# Patient Record
Sex: MALE | Race: WHITE | NOT HISPANIC OR LATINO | Employment: FULL TIME | URBAN - METROPOLITAN AREA
[De-identification: names, ages, dates, MRNs, and addresses within clinical notes are randomized per-mention and may not be internally consistent; named-entity substitution may affect disease eponyms.]

---

## 2017-01-19 ENCOUNTER — AMBULATORY - HEALTHEAST (OUTPATIENT)
Dept: FAMILY MEDICINE | Facility: CLINIC | Age: 42
End: 2017-01-19

## 2017-01-19 DIAGNOSIS — Z13.228 SCREENING FOR ENDOCRINE, NUTRITIONAL, METABOLIC AND IMMUNITY DISORDER: ICD-10-CM

## 2017-01-19 DIAGNOSIS — Z13.21 SCREENING FOR ENDOCRINE, NUTRITIONAL, METABOLIC AND IMMUNITY DISORDER: ICD-10-CM

## 2017-01-19 DIAGNOSIS — E66.3 OVERWEIGHT (BMI 25.0-29.9): ICD-10-CM

## 2017-01-19 DIAGNOSIS — Z13.0 SCREENING FOR ENDOCRINE, NUTRITIONAL, METABOLIC AND IMMUNITY DISORDER: ICD-10-CM

## 2017-01-19 DIAGNOSIS — Z13.29 SCREENING FOR ENDOCRINE, NUTRITIONAL, METABOLIC AND IMMUNITY DISORDER: ICD-10-CM

## 2017-01-20 ENCOUNTER — OFFICE VISIT - HEALTHEAST (OUTPATIENT)
Dept: FAMILY MEDICINE | Facility: CLINIC | Age: 42
End: 2017-01-20

## 2017-01-20 DIAGNOSIS — Z13.0 SCREENING FOR ENDOCRINE, NUTRITIONAL, METABOLIC AND IMMUNITY DISORDER: ICD-10-CM

## 2017-01-20 DIAGNOSIS — Z12.11 SCREEN FOR COLON CANCER: ICD-10-CM

## 2017-01-20 DIAGNOSIS — Z00.00 VISIT FOR PREVENTIVE HEALTH EXAMINATION: ICD-10-CM

## 2017-01-20 DIAGNOSIS — E66.3 OVERWEIGHT (BMI 25.0-29.9): ICD-10-CM

## 2017-01-20 DIAGNOSIS — E66.9 OBESITY (BMI 30-39.9): ICD-10-CM

## 2017-01-20 DIAGNOSIS — Z13.228 SCREENING FOR ENDOCRINE, NUTRITIONAL, METABOLIC AND IMMUNITY DISORDER: ICD-10-CM

## 2017-01-20 DIAGNOSIS — Z13.29 SCREENING FOR ENDOCRINE, NUTRITIONAL, METABOLIC AND IMMUNITY DISORDER: ICD-10-CM

## 2017-01-20 DIAGNOSIS — Z80.0 FAMILY HISTORY OF COLON CANCER IN MOTHER: ICD-10-CM

## 2017-01-20 DIAGNOSIS — Z13.21 SCREENING FOR ENDOCRINE, NUTRITIONAL, METABOLIC AND IMMUNITY DISORDER: ICD-10-CM

## 2017-01-20 LAB
CHOLEST SERPL-MCNC: 272 MG/DL
FASTING STATUS PATIENT QL REPORTED: ABNORMAL
HBA1C MFR BLD: 5.5 % (ref 4.2–6.1)
HDLC SERPL-MCNC: 53 MG/DL
LDLC SERPL CALC-MCNC: 204 MG/DL
TRIGL SERPL-MCNC: 74 MG/DL

## 2017-01-20 ASSESSMENT — MIFFLIN-ST. JEOR: SCORE: 1933.82

## 2018-04-11 ENCOUNTER — RECORDS - HEALTHEAST (OUTPATIENT)
Dept: ADMINISTRATIVE | Facility: OTHER | Age: 43
End: 2018-04-11

## 2018-04-27 ENCOUNTER — RECORDS - HEALTHEAST (OUTPATIENT)
Dept: ADMINISTRATIVE | Facility: OTHER | Age: 43
End: 2018-04-27

## 2019-04-17 ENCOUNTER — COMMUNICATION - HEALTHEAST (OUTPATIENT)
Dept: FAMILY MEDICINE | Facility: CLINIC | Age: 44
End: 2019-04-17

## 2019-04-17 DIAGNOSIS — Z12.11 SPECIAL SCREENING FOR MALIGNANT NEOPLASMS, COLON: ICD-10-CM

## 2019-04-23 ENCOUNTER — OFFICE VISIT - HEALTHEAST (OUTPATIENT)
Dept: FAMILY MEDICINE | Facility: CLINIC | Age: 44
End: 2019-04-23

## 2019-04-23 DIAGNOSIS — Z13.29 SCREENING FOR ENDOCRINE, NUTRITIONAL, METABOLIC AND IMMUNITY DISORDER: ICD-10-CM

## 2019-04-23 DIAGNOSIS — I48.0 PAROXYSMAL ATRIAL FIBRILLATION (H): ICD-10-CM

## 2019-04-23 DIAGNOSIS — Z80.0 FAMILY HISTORY OF COLON CANCER IN MOTHER: ICD-10-CM

## 2019-04-23 DIAGNOSIS — Z13.0 SCREENING FOR ENDOCRINE, NUTRITIONAL, METABOLIC AND IMMUNITY DISORDER: ICD-10-CM

## 2019-04-23 DIAGNOSIS — Z13.228 SCREENING FOR ENDOCRINE, NUTRITIONAL, METABOLIC AND IMMUNITY DISORDER: ICD-10-CM

## 2019-04-23 DIAGNOSIS — Z00.00 VISIT FOR PREVENTIVE HEALTH EXAMINATION: ICD-10-CM

## 2019-04-23 DIAGNOSIS — E78.2 MIXED HYPERLIPIDEMIA: ICD-10-CM

## 2019-04-23 DIAGNOSIS — Z13.21 SCREENING FOR ENDOCRINE, NUTRITIONAL, METABOLIC AND IMMUNITY DISORDER: ICD-10-CM

## 2019-04-23 LAB
ALBUMIN SERPL-MCNC: 4.4 G/DL (ref 3.5–5)
ALP SERPL-CCNC: 54 U/L (ref 45–120)
ALT SERPL W P-5'-P-CCNC: 27 U/L (ref 0–45)
ANION GAP SERPL CALCULATED.3IONS-SCNC: 7 MMOL/L (ref 5–18)
AST SERPL W P-5'-P-CCNC: 25 U/L (ref 0–40)
BILIRUB SERPL-MCNC: 0.5 MG/DL (ref 0–1)
BUN SERPL-MCNC: 11 MG/DL (ref 8–22)
CALCIUM SERPL-MCNC: 10.2 MG/DL (ref 8.5–10.5)
CHLORIDE BLD-SCNC: 101 MMOL/L (ref 98–107)
CHOLEST SERPL-MCNC: 251 MG/DL
CO2 SERPL-SCNC: 30 MMOL/L (ref 22–31)
CREAT SERPL-MCNC: 0.78 MG/DL (ref 0.7–1.3)
FASTING STATUS PATIENT QL REPORTED: NO
GFR SERPL CREATININE-BSD FRML MDRD: >60 ML/MIN/1.73M2
GLUCOSE BLD-MCNC: 96 MG/DL (ref 70–125)
HBA1C MFR BLD: 5.4 % (ref 3.5–6)
HDLC SERPL-MCNC: 57 MG/DL
LDLC SERPL CALC-MCNC: 178 MG/DL
MAGNESIUM SERPL-MCNC: 2.1 MG/DL (ref 1.8–2.6)
POTASSIUM BLD-SCNC: 4.7 MMOL/L (ref 3.5–5)
PROT SERPL-MCNC: 7.3 G/DL (ref 6–8)
SODIUM SERPL-SCNC: 138 MMOL/L (ref 136–145)
TRIGL SERPL-MCNC: 81 MG/DL
TSH SERPL DL<=0.005 MIU/L-ACNC: 1.42 UIU/ML (ref 0.3–5)

## 2019-04-23 ASSESSMENT — MIFFLIN-ST. JEOR: SCORE: 1916.02

## 2019-09-04 ENCOUNTER — RECORDS - HEALTHEAST (OUTPATIENT)
Dept: ADMINISTRATIVE | Facility: OTHER | Age: 44
End: 2019-09-04

## 2019-11-10 ENCOUNTER — RECORDS - HEALTHEAST (OUTPATIENT)
Dept: ADMINISTRATIVE | Facility: OTHER | Age: 44
End: 2019-11-10

## 2019-11-11 ENCOUNTER — AMBULATORY - HEALTHEAST (OUTPATIENT)
Dept: CARDIOLOGY | Facility: CLINIC | Age: 44
End: 2019-11-11

## 2019-11-11 ENCOUNTER — COMMUNICATION - HEALTHEAST (OUTPATIENT)
Dept: CARDIOLOGY | Facility: CLINIC | Age: 44
End: 2019-11-11

## 2019-11-11 ENCOUNTER — OFFICE VISIT - HEALTHEAST (OUTPATIENT)
Dept: CARDIOLOGY | Facility: CLINIC | Age: 44
End: 2019-11-11

## 2019-11-11 DIAGNOSIS — R07.89 CHEST PRESSURE: ICD-10-CM

## 2019-11-11 DIAGNOSIS — I48.0 PAROXYSMAL ATRIAL FIBRILLATION (H): ICD-10-CM

## 2019-11-11 DIAGNOSIS — E78.5 HYPERLIPIDEMIA LDL GOAL <100: ICD-10-CM

## 2019-11-11 DIAGNOSIS — I48.91 ATRIAL FIBRILLATION, UNSPECIFIED TYPE (H): ICD-10-CM

## 2019-11-11 LAB
ATRIAL RATE - MUSE: 357 BPM
DIASTOLIC BLOOD PRESSURE - MUSE: NORMAL
INTERPRETATION ECG - MUSE: NORMAL
P AXIS - MUSE: NORMAL
PR INTERVAL - MUSE: NORMAL
QRS DURATION - MUSE: 82 MS
QT - MUSE: 356 MS
QTC - MUSE: 423 MS
R AXIS - MUSE: -10 DEGREES
SYSTOLIC BLOOD PRESSURE - MUSE: NORMAL
T AXIS - MUSE: -12 DEGREES
VENTRICULAR RATE- MUSE: 85 BPM

## 2019-11-11 ASSESSMENT — MIFFLIN-ST. JEOR: SCORE: 1929.97

## 2019-11-12 ENCOUNTER — ANESTHESIA - HEALTHEAST (OUTPATIENT)
Dept: CARDIOLOGY | Facility: CLINIC | Age: 44
End: 2019-11-12

## 2019-11-12 ENCOUNTER — HOSPITAL ENCOUNTER (OUTPATIENT)
Dept: CARDIOLOGY | Facility: CLINIC | Age: 44
Discharge: HOME | End: 2019-11-12
Attending: INTERNAL MEDICINE

## 2019-11-12 ENCOUNTER — SURGERY - HEALTHEAST (OUTPATIENT)
Dept: CARDIOLOGY | Facility: CLINIC | Age: 44
End: 2019-11-12

## 2019-11-12 ENCOUNTER — COMMUNICATION - HEALTHEAST (OUTPATIENT)
Dept: FAMILY MEDICINE | Facility: CLINIC | Age: 44
End: 2019-11-12

## 2019-11-12 DIAGNOSIS — I48.0 PAROXYSMAL ATRIAL FIBRILLATION (H): ICD-10-CM

## 2019-11-12 LAB
BSA FOR ECHO PROCEDURE: 2.26 M2
CV BLOOD PRESSURE: NORMAL MMHG
CV ECHO HEIGHT: 73 IN
CV ECHO WEIGHT: 220 LBS
ECHO EJECTION FRACTION ESTIMATED: 60 %
LEFT VENTRICLE HEART RATE: 91 BPM
NUC REST DIASTOLIC VOLUME INDEX: 3520 LBS
NUC REST SYSTOLIC VOLUME INDEX: 73 IN

## 2019-11-14 ENCOUNTER — HOSPITAL ENCOUNTER (OUTPATIENT)
Dept: NUCLEAR MEDICINE | Facility: CLINIC | Age: 44
Discharge: HOME | End: 2019-11-14
Attending: INTERNAL MEDICINE

## 2019-11-14 ENCOUNTER — HOSPITAL ENCOUNTER (OUTPATIENT)
Dept: CARDIOLOGY | Facility: CLINIC | Age: 44
Discharge: HOME | End: 2019-11-14
Attending: INTERNAL MEDICINE

## 2019-11-14 DIAGNOSIS — E78.5 HYPERLIPIDEMIA LDL GOAL <100: ICD-10-CM

## 2019-11-14 DIAGNOSIS — R07.89 CHEST PRESSURE: ICD-10-CM

## 2019-11-14 DIAGNOSIS — I48.0 PAROXYSMAL ATRIAL FIBRILLATION (H): ICD-10-CM

## 2019-11-14 LAB
CV STRESS CURRENT BP HE: NORMAL
CV STRESS CURRENT HR HE: 104
CV STRESS CURRENT HR HE: 105
CV STRESS CURRENT HR HE: 108
CV STRESS CURRENT HR HE: 109
CV STRESS CURRENT HR HE: 121
CV STRESS CURRENT HR HE: 123
CV STRESS CURRENT HR HE: 125
CV STRESS CURRENT HR HE: 128
CV STRESS CURRENT HR HE: 128
CV STRESS CURRENT HR HE: 133
CV STRESS CURRENT HR HE: 147
CV STRESS CURRENT HR HE: 148
CV STRESS CURRENT HR HE: 150
CV STRESS CURRENT HR HE: 61
CV STRESS CURRENT HR HE: 73
CV STRESS CURRENT HR HE: 75
CV STRESS CURRENT HR HE: 75
CV STRESS CURRENT HR HE: 76
CV STRESS CURRENT HR HE: 77
CV STRESS CURRENT HR HE: 80
CV STRESS CURRENT HR HE: 81
CV STRESS CURRENT HR HE: 81
CV STRESS CURRENT HR HE: 82
CV STRESS CURRENT HR HE: 84
CV STRESS CURRENT HR HE: 85
CV STRESS CURRENT HR HE: 89
CV STRESS CURRENT HR HE: 92
CV STRESS CURRENT HR HE: 92
CV STRESS CURRENT HR HE: 94
CV STRESS CURRENT HR HE: 95
CV STRESS CURRENT HR HE: 95
CV STRESS CURRENT HR HE: 97
CV STRESS DEVIATION TIME HE: NORMAL
CV STRESS ECHO PERCENT HR HE: NORMAL
CV STRESS EXERCISE STAGE HE: NORMAL
CV STRESS EXERCISE STAGE REACHED HE: NORMAL
CV STRESS FINAL RESTING BP HE: NORMAL
CV STRESS FINAL RESTING HR HE: 75
CV STRESS MAX HR HE: 150
CV STRESS MAX TREADMILL GRADE HE: 18
CV STRESS MAX TREADMILL SPEED HE: 5
CV STRESS PEAK DIA BP HE: NORMAL
CV STRESS PEAK SYS BP HE: NORMAL
CV STRESS PHASE HE: NORMAL
CV STRESS PROTOCOL HE: NORMAL
CV STRESS RESTING PT POSITION HE: NORMAL
CV STRESS RESTING PT POSITION HE: NORMAL
CV STRESS ST DEVIATION AMOUNT HE: NORMAL
CV STRESS ST DEVIATION ELEVATION HE: NORMAL
CV STRESS ST EVELATION AMOUNT HE: NORMAL
CV STRESS TEST TYPE HE: NORMAL
CV STRESS TOTAL STAGE TIME MIN 1 HE: NORMAL
NUC STRESS EJECTION FRACTION: 52 %
RATE PRESSURE PRODUCT: NORMAL
STRESS ECHO BASELINE DIASTOLIC HE: 81
STRESS ECHO BASELINE HR: 63
STRESS ECHO BASELINE SYSTOLIC BP: 136
STRESS ECHO CALCULATED PERCENT HR: 86 %
STRESS ECHO LAST STRESS DIASTOLIC BP: 100
STRESS ECHO LAST STRESS HR: 148
STRESS ECHO LAST STRESS SYSTOLIC BP: 180
STRESS ECHO POST ESTIMATED WORKLOAD: 13.5
STRESS ECHO POST EXERCISE DUR MIN: 13
STRESS ECHO POST EXERCISE DUR SEC: 3
STRESS ECHO TARGET HR: 174

## 2019-11-21 ENCOUNTER — COMMUNICATION - HEALTHEAST (OUTPATIENT)
Dept: SCHEDULING | Facility: CLINIC | Age: 44
End: 2019-11-21

## 2019-11-21 DIAGNOSIS — E78.2 MIXED HYPERLIPIDEMIA: ICD-10-CM

## 2019-11-21 DIAGNOSIS — E66.9 OBESITY (BMI 30-39.9): ICD-10-CM

## 2019-11-25 ENCOUNTER — OFFICE VISIT - HEALTHEAST (OUTPATIENT)
Dept: FAMILY MEDICINE | Facility: CLINIC | Age: 44
End: 2019-11-25

## 2019-11-25 DIAGNOSIS — E66.3 OVERWEIGHT (BMI 25.0-29.9): ICD-10-CM

## 2019-11-25 DIAGNOSIS — E78.2 MIXED HYPERLIPIDEMIA: ICD-10-CM

## 2019-11-25 LAB
ALBUMIN SERPL-MCNC: 4.2 G/DL (ref 3.5–5)
ALP SERPL-CCNC: 54 U/L (ref 45–120)
ALT SERPL W P-5'-P-CCNC: 29 U/L (ref 0–45)
ANION GAP SERPL CALCULATED.3IONS-SCNC: 8 MMOL/L (ref 5–18)
AST SERPL W P-5'-P-CCNC: 27 U/L (ref 0–40)
BILIRUB SERPL-MCNC: 0.7 MG/DL (ref 0–1)
BUN SERPL-MCNC: 9 MG/DL (ref 8–22)
CALCIUM SERPL-MCNC: 9.4 MG/DL (ref 8.5–10.5)
CHLORIDE BLD-SCNC: 105 MMOL/L (ref 98–107)
CHOLEST SERPL-MCNC: 143 MG/DL
CO2 SERPL-SCNC: 27 MMOL/L (ref 22–31)
CREAT SERPL-MCNC: 0.77 MG/DL (ref 0.7–1.3)
CRP SERPL HS-MCNC: 0.7 MG/L (ref 0–3)
FASTING STATUS PATIENT QL REPORTED: YES
GFR SERPL CREATININE-BSD FRML MDRD: >60 ML/MIN/1.73M2
GLUCOSE BLD-MCNC: 93 MG/DL (ref 70–125)
HDLC SERPL-MCNC: 43 MG/DL
LDLC SERPL CALC-MCNC: 88 MG/DL
POTASSIUM BLD-SCNC: 4.8 MMOL/L (ref 3.5–5)
PROT SERPL-MCNC: 6.7 G/DL (ref 6–8)
SODIUM SERPL-SCNC: 140 MMOL/L (ref 136–145)
TRIGL SERPL-MCNC: 62 MG/DL

## 2019-11-26 LAB — LIPOPROTEIN (A) - HISTORICAL: 17 MG/DL

## 2019-11-27 ENCOUNTER — COMMUNICATION - HEALTHEAST (OUTPATIENT)
Dept: FAMILY MEDICINE | Facility: CLINIC | Age: 44
End: 2019-11-27

## 2019-12-02 ENCOUNTER — COMMUNICATION - HEALTHEAST (OUTPATIENT)
Dept: CARDIOLOGY | Facility: CLINIC | Age: 44
End: 2019-12-02

## 2019-12-02 ENCOUNTER — OFFICE VISIT - HEALTHEAST (OUTPATIENT)
Dept: CARDIOLOGY | Facility: CLINIC | Age: 44
End: 2019-12-02

## 2019-12-02 DIAGNOSIS — I48.0 PAF (PAROXYSMAL ATRIAL FIBRILLATION) (H): ICD-10-CM

## 2019-12-02 DIAGNOSIS — R42 LIGHTHEADEDNESS: ICD-10-CM

## 2019-12-02 ASSESSMENT — MIFFLIN-ST. JEOR: SCORE: 1926.79

## 2019-12-03 ENCOUNTER — COMMUNICATION - HEALTHEAST (OUTPATIENT)
Dept: CARDIOLOGY | Facility: CLINIC | Age: 44
End: 2019-12-03

## 2019-12-03 DIAGNOSIS — I48.0 PAF (PAROXYSMAL ATRIAL FIBRILLATION) (H): ICD-10-CM

## 2019-12-04 ENCOUNTER — AMBULATORY - HEALTHEAST (OUTPATIENT)
Dept: CARDIOLOGY | Facility: CLINIC | Age: 44
End: 2019-12-04

## 2019-12-04 DIAGNOSIS — Z01.818 PREOPERATIVE TESTING: ICD-10-CM

## 2019-12-04 DIAGNOSIS — I48.0 PAF (PAROXYSMAL ATRIAL FIBRILLATION) (H): ICD-10-CM

## 2019-12-05 ENCOUNTER — AMBULATORY - HEALTHEAST (OUTPATIENT)
Dept: CARDIOLOGY | Facility: CLINIC | Age: 44
End: 2019-12-05

## 2019-12-05 ENCOUNTER — COMMUNICATION - HEALTHEAST (OUTPATIENT)
Dept: CARDIOLOGY | Facility: CLINIC | Age: 44
End: 2019-12-05

## 2019-12-05 ENCOUNTER — COMMUNICATION - HEALTHEAST (OUTPATIENT)
Dept: FAMILY MEDICINE | Facility: CLINIC | Age: 44
End: 2019-12-05

## 2019-12-05 DIAGNOSIS — I48.0 PAF (PAROXYSMAL ATRIAL FIBRILLATION) (H): ICD-10-CM

## 2019-12-09 ENCOUNTER — COMMUNICATION - HEALTHEAST (OUTPATIENT)
Dept: CARDIOLOGY | Facility: CLINIC | Age: 44
End: 2019-12-09

## 2019-12-16 ENCOUNTER — HOSPITAL ENCOUNTER (OUTPATIENT)
Dept: MRI IMAGING | Facility: HOSPITAL | Age: 44
Discharge: HOME | End: 2019-12-16
Attending: INTERNAL MEDICINE

## 2019-12-16 DIAGNOSIS — Z01.818 PREOPERATIVE TESTING: ICD-10-CM

## 2019-12-16 DIAGNOSIS — I48.0 PAF (PAROXYSMAL ATRIAL FIBRILLATION) (H): ICD-10-CM

## 2019-12-17 ENCOUNTER — COMMUNICATION - HEALTHEAST (OUTPATIENT)
Dept: CARDIOLOGY | Facility: CLINIC | Age: 44
End: 2019-12-17

## 2019-12-17 LAB
CCTA EJECTION FRACTION: 32 %
CCTA INTERVENTRICULAR SETPUM: 1.1 CM (ref 0.6–1.1)
CCTA LEFT INTERNAL DIMENSION IN SYSTOLE: 3.9 CM (ref 2.1–4)
CCTA LEFT VENTRICULAR INTERNAL DIMENSION IN DIASTOLE: 4.8 CM (ref 3.5–6)
CCTA LEFT VENTRICULAR MASS: 158.82 G
CCTA POSTERIOR WALL: 0.8 CM (ref 0.6–1.1)
MR CARDIAC LEFT VENTRIAL CARDIAC INDEX: 2.9 L/MIN/M2 (ref 1.7–4.2)
MR CARDIAC LEFT VENTRICAL CARDIAC OUTPUT: 6.6 L/MIN (ref 2.8–8.8)
MR CARDIAC LEFT VENTRICULAR DIASTOLIC VOLUME INDEX: 79.89 ML/M2 (ref 47–92)
MR CARDIAC LEFT VENTRICULAR MASS INDEX: 75.87 G/M2 (ref 70–113)
MR CARDIAC LEFT VENTRICULAR MASS: 170 G (ref 118–238)
MR CARDIAC LEFT VENTRICULAR STROKE VOLUME INDEX: 37.49 ML/M2 (ref 32–62)
MR CARDIAC LEFT VENTRICULAR SYSTOLIC VOLUME INDEX: 42.4 ML/M2 (ref 13–30)
MR EJECTION FRACTION: 46.93 %
MR HEIGHT: 1.85 M
MR LEFT VENTRICULAR DYSTOLIC VOLUMEN: 179 ML (ref 77–195)
MR LEFT VENTRICULAR STROKE VOLUMEN: 84 ML (ref 51–133)
MR LEFT VENTRICULAR SYSTOLIC VOLUME: 95 ML (ref 19–72)
MR WEIGHT: 99.8 KG

## 2019-12-26 ENCOUNTER — AMBULATORY - HEALTHEAST (OUTPATIENT)
Dept: CARDIOLOGY | Facility: CLINIC | Age: 44
End: 2019-12-26

## 2019-12-26 ENCOUNTER — SURGERY - HEALTHEAST (OUTPATIENT)
Dept: CARDIOLOGY | Facility: CLINIC | Age: 44
End: 2019-12-26

## 2019-12-26 ENCOUNTER — OFFICE VISIT - HEALTHEAST (OUTPATIENT)
Dept: CARDIOLOGY | Facility: CLINIC | Age: 44
End: 2019-12-26

## 2019-12-26 DIAGNOSIS — I48.0 PAF (PAROXYSMAL ATRIAL FIBRILLATION) (H): ICD-10-CM

## 2019-12-26 LAB
ANION GAP SERPL CALCULATED.3IONS-SCNC: 8 MMOL/L (ref 5–18)
BUN SERPL-MCNC: 10 MG/DL (ref 8–22)
CALCIUM SERPL-MCNC: 9.9 MG/DL (ref 8.5–10.5)
CHLORIDE BLD-SCNC: 105 MMOL/L (ref 98–107)
CO2 SERPL-SCNC: 26 MMOL/L (ref 22–31)
CREAT SERPL-MCNC: 0.77 MG/DL (ref 0.7–1.3)
ERYTHROCYTE [DISTWIDTH] IN BLOOD BY AUTOMATED COUNT: 12.3 % (ref 11–14.5)
GFR SERPL CREATININE-BSD FRML MDRD: >60 ML/MIN/1.73M2
GLUCOSE BLD-MCNC: 65 MG/DL (ref 70–125)
HCT VFR BLD AUTO: 50 % (ref 40–54)
HGB BLD-MCNC: 17 G/DL (ref 14–18)
MCH RBC QN AUTO: 30.7 PG (ref 27–34)
MCHC RBC AUTO-ENTMCNC: 34 G/DL (ref 32–36)
MCV RBC AUTO: 90 FL (ref 80–100)
PLATELET # BLD AUTO: 244 THOU/UL (ref 140–440)
PMV BLD AUTO: 9 FL (ref 8.5–12.5)
POTASSIUM BLD-SCNC: 4.5 MMOL/L (ref 3.5–5)
RBC # BLD AUTO: 5.53 MILL/UL (ref 4.4–6.2)
SODIUM SERPL-SCNC: 139 MMOL/L (ref 136–145)
WBC: 5 THOU/UL (ref 4–11)

## 2019-12-26 ASSESSMENT — MIFFLIN-ST. JEOR: SCORE: 1922.26

## 2019-12-27 LAB
ATRIAL RATE - MUSE: NORMAL
DIASTOLIC BLOOD PRESSURE - MUSE: NORMAL
INTERPRETATION ECG - MUSE: NORMAL
P AXIS - MUSE: NORMAL
PR INTERVAL - MUSE: NORMAL
QRS DURATION - MUSE: 80 MS
QT - MUSE: 360 MS
QTC - MUSE: 447 MS
R AXIS - MUSE: 25 DEGREES
SYSTOLIC BLOOD PRESSURE - MUSE: NORMAL
T AXIS - MUSE: -23 DEGREES
VENTRICULAR RATE- MUSE: 93 BPM

## 2019-12-30 ENCOUNTER — ANESTHESIA - HEALTHEAST (OUTPATIENT)
Dept: CARDIOLOGY | Facility: CLINIC | Age: 44
End: 2019-12-30

## 2019-12-31 ENCOUNTER — SURGERY - HEALTHEAST (OUTPATIENT)
Dept: CARDIOLOGY | Facility: CLINIC | Age: 44
End: 2019-12-31

## 2019-12-31 ASSESSMENT — MIFFLIN-ST. JEOR: SCORE: 1940.4

## 2020-01-01 ASSESSMENT — MIFFLIN-ST. JEOR: SCORE: 1931.44

## 2020-01-02 ENCOUNTER — COMMUNICATION - HEALTHEAST (OUTPATIENT)
Dept: CARDIOLOGY | Facility: CLINIC | Age: 45
End: 2020-01-02

## 2020-01-03 ENCOUNTER — AMBULATORY - HEALTHEAST (OUTPATIENT)
Dept: CARDIOLOGY | Facility: CLINIC | Age: 45
End: 2020-01-03

## 2020-01-03 DIAGNOSIS — I48.0 PAF (PAROXYSMAL ATRIAL FIBRILLATION) (H): ICD-10-CM

## 2020-01-03 DIAGNOSIS — I48.0 PAROXYSMAL ATRIAL FIBRILLATION (H): ICD-10-CM

## 2020-01-03 LAB
ATRIAL RATE - MUSE: 57 BPM
DIASTOLIC BLOOD PRESSURE - MUSE: NORMAL
INTERPRETATION ECG - MUSE: NORMAL
P AXIS - MUSE: 49 DEGREES
PR INTERVAL - MUSE: 188 MS
QRS DURATION - MUSE: 88 MS
QT - MUSE: 436 MS
QTC - MUSE: 483 MS
R AXIS - MUSE: 6 DEGREES
SYSTOLIC BLOOD PRESSURE - MUSE: NORMAL
T AXIS - MUSE: 21 DEGREES
VENTRICULAR RATE- MUSE: 74 BPM

## 2020-01-03 ASSESSMENT — MIFFLIN-ST. JEOR: SCORE: 1934.37

## 2020-01-28 PROBLEM — I48.0 PAF (PAROXYSMAL ATRIAL FIBRILLATION) (H): Status: ACTIVE | Noted: 2019-12-02

## 2020-01-28 PROBLEM — Z80.0 FAMILY HISTORY OF COLON CANCER IN MOTHER: Chronic | Status: ACTIVE | Noted: 2017-01-20

## 2020-01-28 PROBLEM — I48.0 PAF (PAROXYSMAL ATRIAL FIBRILLATION) (H): Chronic | Status: ACTIVE | Noted: 2019-12-02

## 2020-01-28 PROBLEM — E78.5 DYSLIPIDEMIA: Chronic | Status: ACTIVE | Noted: 2020-01-28

## 2020-01-28 PROBLEM — Z80.0 FAMILY HISTORY OF COLON CANCER IN MOTHER: Status: ACTIVE | Noted: 2017-01-20

## 2020-02-03 ENCOUNTER — OFFICE VISIT (OUTPATIENT)
Dept: FAMILY MEDICINE | Facility: CLINIC | Age: 45
End: 2020-02-03
Payer: COMMERCIAL

## 2020-02-03 VITALS
OXYGEN SATURATION: 97 % | DIASTOLIC BLOOD PRESSURE: 77 MMHG | TEMPERATURE: 98.3 F | HEIGHT: 72 IN | BODY MASS INDEX: 29.36 KG/M2 | WEIGHT: 216.75 LBS | SYSTOLIC BLOOD PRESSURE: 121 MMHG | HEART RATE: 54 BPM | RESPIRATION RATE: 14 BRPM

## 2020-02-03 DIAGNOSIS — E78.5 DYSLIPIDEMIA: Primary | Chronic | ICD-10-CM

## 2020-02-03 DIAGNOSIS — Z11.4 SCREENING FOR HIV (HUMAN IMMUNODEFICIENCY VIRUS): ICD-10-CM

## 2020-02-03 DIAGNOSIS — I48.0 PAF (PAROXYSMAL ATRIAL FIBRILLATION) (H): ICD-10-CM

## 2020-02-03 LAB
CHOLEST SERPL-MCNC: 201 MG/DL (ref 0–200)
CHOLEST/HDLC SERPL: 3.8 {RATIO} (ref 0–5)
FASTING SPECIMEN: YES
HDLC SERPL-MCNC: 53 MG/DL
LDLC SERPL CALC-MCNC: 128 MG/DL (ref 0–129)
TRIGL SERPL-MCNC: 97 MG/DL (ref 0–150)
VLDL-CHOLESTEROL: 19 (ref 7–32)

## 2020-02-03 SDOH — HEALTH STABILITY: MENTAL HEALTH: HOW OFTEN DO YOU HAVE A DRINK CONTAINING ALCOHOL?: 4 OR MORE TIMES A WEEK

## 2020-02-03 SDOH — HEALTH STABILITY: MENTAL HEALTH: HOW MANY STANDARD DRINKS CONTAINING ALCOHOL DO YOU HAVE ON A TYPICAL DAY?: 1 OR 2

## 2020-02-03 SDOH — HEALTH STABILITY: MENTAL HEALTH: HOW OFTEN DO YOU HAVE 6 OR MORE DRINKS ON ONE OCCASION?: NEVER

## 2020-02-03 ASSESSMENT — MIFFLIN-ST. JEOR: SCORE: 1900.68

## 2020-02-03 NOTE — NURSING NOTE
"44 year old  Chief Complaint   Patient presents with     \[facility]\"" Care     Lipids     off statin since Dec 2019 wants to check again, is fasting today     Heart Problem     heart ablation in 12/31/2019       Blood pressure 121/77, pulse 54, temperature 98.3  F (36.8  C), temperature source Oral, resp. rate 14, height 1.828 m (5' 11.97\"), weight 98.3 kg (216 lb 12 oz), SpO2 97 %. Body mass index is 29.42 kg/m .  Patient Active Problem List   Diagnosis     PAF (paroxysmal atrial fibrillation) (H)     Family history of colon cancer in mother     Herniated vertebral disc     Dyslipidemia       Wt Readings from Last 2 Encounters:   02/03/20 98.3 kg (216 lb 12 oz)     BP Readings from Last 3 Encounters:   02/03/20 121/77         Current Outpatient Medications   Medication     apixaban ANTICOAGULANT (ELIQUIS) 5 MG tablet     No current facility-administered medications for this visit.        Social History     Tobacco Use     Smoking status: Never Smoker     Smokeless tobacco: Never Used   Substance Use Topics     Alcohol use: Yes     Frequency: 4 or more times a week     Drinks per session: 1 or 2     Binge frequency: Never     Drug use: Not Currently     Types: Marijuana       Health Maintenance Due   Topic Date Due     PREVENTIVE CARE VISIT  1973     HIV SCREENING  08/14/1988     LIPID  08/14/2008       No results found for: PAP      February 3, 2020 8:04 AM    "

## 2020-02-04 LAB — HIV 1+2 AB+HIV1 P24 AG SERPL QL IA: NONREACTIVE

## 2020-02-11 ENCOUNTER — OFFICE VISIT - HEALTHEAST (OUTPATIENT)
Dept: CARDIOLOGY | Facility: CLINIC | Age: 45
End: 2020-02-11

## 2020-02-11 DIAGNOSIS — Z86.79 STATUS POST ABLATION OF ATRIAL FIBRILLATION: ICD-10-CM

## 2020-02-11 DIAGNOSIS — I48.0 PAF (PAROXYSMAL ATRIAL FIBRILLATION) (H): ICD-10-CM

## 2020-02-11 DIAGNOSIS — Z98.890 STATUS POST ABLATION OF ATRIAL FIBRILLATION: ICD-10-CM

## 2020-02-11 DIAGNOSIS — E78.2 MIXED HYPERLIPIDEMIA: ICD-10-CM

## 2020-02-11 ASSESSMENT — MIFFLIN-ST. JEOR: SCORE: 1904.11

## 2020-02-28 ENCOUNTER — HOSPITAL ENCOUNTER (OUTPATIENT)
Dept: CARDIOLOGY | Facility: CLINIC | Age: 45
Discharge: HOME | End: 2020-02-28

## 2020-02-28 DIAGNOSIS — Z86.79 STATUS POST ABLATION OF ATRIAL FIBRILLATION: ICD-10-CM

## 2020-02-28 DIAGNOSIS — Z98.890 STATUS POST ABLATION OF ATRIAL FIBRILLATION: ICD-10-CM

## 2020-03-11 ENCOUNTER — HEALTH MAINTENANCE LETTER (OUTPATIENT)
Age: 45
End: 2020-03-11

## 2020-03-12 ENCOUNTER — AMBULATORY - HEALTHEAST (OUTPATIENT)
Dept: CARDIOLOGY | Facility: CLINIC | Age: 45
End: 2020-03-12

## 2020-03-12 DIAGNOSIS — I48.0 PAF (PAROXYSMAL ATRIAL FIBRILLATION) (H): ICD-10-CM

## 2020-03-24 ENCOUNTER — OFFICE VISIT - HEALTHEAST (OUTPATIENT)
Dept: CARDIOLOGY | Facility: CLINIC | Age: 45
End: 2020-03-24

## 2020-03-24 DIAGNOSIS — Z98.890 STATUS POST ABLATION OF ATRIAL FIBRILLATION: ICD-10-CM

## 2020-03-24 DIAGNOSIS — I48.0 PAF (PAROXYSMAL ATRIAL FIBRILLATION) (H): ICD-10-CM

## 2020-03-24 DIAGNOSIS — Z86.79 STATUS POST ABLATION OF ATRIAL FIBRILLATION: ICD-10-CM

## 2021-01-04 ENCOUNTER — HEALTH MAINTENANCE LETTER (OUTPATIENT)
Age: 46
End: 2021-01-04

## 2021-04-25 ENCOUNTER — HEALTH MAINTENANCE LETTER (OUTPATIENT)
Age: 46
End: 2021-04-25

## 2021-04-25 ENCOUNTER — COMMUNICATION - HEALTHEAST (OUTPATIENT)
Dept: ADMINISTRATIVE | Facility: CLINIC | Age: 46
End: 2021-04-25

## 2021-04-29 ENCOUNTER — COMMUNICATION - HEALTHEAST (OUTPATIENT)
Dept: CARDIOLOGY | Facility: CLINIC | Age: 46
End: 2021-04-29

## 2021-05-27 NOTE — TELEPHONE ENCOUNTER
Spoke with patient relay message below. Patient would like for us to setup appointment for Colonoscopy.   Scheduled physical appointment for 4/23/19 @9:20am. If unable to come, patient will re-schedule.

## 2021-05-27 NOTE — TELEPHONE ENCOUNTER
Referral Request  Type of referral: colonoscopy  Who s requesting: Patient  Why the request: pt had a referral for a colonoscopy, but never got one and the referral lapsed.  Have you been seen for this request: Yes  Does patient have a preference on a group/provider? no  Okay to leave a detailed message?  Yes

## 2021-05-27 NOTE — TELEPHONE ENCOUNTER
New colonoscopy order signed, [place] johnny she will call him to schedule an appointment.  Please have him schedule a general physical appointment.

## 2021-05-28 NOTE — PROGRESS NOTES
Assessment:      Healthy male exam.        Plan:     1.  History of lone A. fib, recent recurrence of symptoms, we discussed about possibly seeing a cardiologist to determine if patient needs to be on long-term antiarrhythmic or/and anticoagulation or just observation.  Mom has a history of colon cancer, we discussed about screening colonoscopy, risk and benefit reviewed, referral orders was signed.  2. Patient Counseling:  --Nutrition: Stressed importance of moderation in sodium/caffeine intake, saturated fat and cholesterol, caloric balance, sufficient intake of fresh fruits, vegetables, fiber, calcium, iron.  --Discussed the issue of calcium supplement, and the daily use of baby aspirin.  --Exercise: Stressed the importance of regular exercise.   --Substance Abuse: Discussed cessation/primary prevention of tobacco, alcohol, or other drug use; driving or other dangerous activities under the influence; availability of treatment for abuse.    --Sexuality: Discussed sexually transmitted diseases, partner selection, use of condoms, avoidance of unintended pregnancy.  --Injury prevention: Discussed safety belts, safety helmets, smoke detector, smoking near bedding or upholstery.   --Dental health: Discussed importance of regular tooth brushing, flossing, and dental visits.  --Immunizations reviewed.  --Discussed timing and benefits of screening colonoscopy and alternative options.  --After hours service discussed with patient             -- I have had an Advance Directives discussion with the patient.  The following high BMI interventions were performed this visit: encouragement to exercise and weight loss from baseline weight    3. Discussed the patient's BMI with him.  The BMI is above average; BMI management plan is completed  4. Follow up as needed for acute illness     Subjective:      Juancarlos Causey is a 43 y.o. male who presents for an annual exam. The patient reports that there is not domestic  violence in his life.   History of lone MARIA ISABEL mahoney, has been experiencing new symptoms, perhaps once a week, usually exacerbated by alcohol intake, sometime by exercise also, rest seems to be helping.  Healthy Habits:   Regular Exercise: Yes  Sunscreen Use: Yes  Healthy Diet: Yes  Dental Visits Regularly: Yes  Seat Belt: Yes  Sexually active: Yes  Monthly Self Testicular Exams:  Yes  Hemoccults: N/A  Flex Sig: N/A  Colonoscopy: Phone number given to schedule an appointment.  Mom has a history of colon cancer.  Lipid Profile: Yes and  elevated, discussed about starting medication if LDL  above 190  Glucose Screen: Yes  Prevention of Osteoporosis: Yes  Last Dexa: N/A  Guns at Home:  N/A      Immunization History   Administered Date(s) Administered     Influenza [id number], 11/06/2009     Influenza, inj, historic,unspecified 10/02/2009     Influenza, seasonal,quad inj 6-35 mos 10/01/2012     Tdap 01/20/2017     Immunization status: stated as current, but no records available.  Vision Screening:both eyes  Hearing: PASS     No current outpatient medications on file.     No current facility-administered medications for this visit.      No past medical history on file.  Past Surgical History:   Procedure Laterality Date     HERNIA REPAIR Bilateral      Patient has no known allergies.  Family History   Problem Relation Age of Onset     Breast cancer Mother      Colon cancer Mother      Diabetes Father      Atrial fibrillation Father      Arthritis Maternal Grandmother      Social History     Socioeconomic History     Marital status:      Spouse name: Not on file     Number of children: Not on file     Years of education: Not on file     Highest education level: Not on file   Occupational History     Not on file   Social Needs     Financial resource strain: Not on file     Food insecurity:     Worry: Not on file     Inability: Not on file     Transportation needs:     Medical: Not on file     Non-medical: Not on file  "  Tobacco Use     Smoking status: Never Smoker     Smokeless tobacco: Never Used   Substance and Sexual Activity     Alcohol use: Yes     Frequency: 4 or more times a week     Drug use: Never     Sexual activity: Yes   Lifestyle     Physical activity:     Days per week: Not on file     Minutes per session: Not on file     Stress: Not on file   Relationships     Social connections:     Talks on phone: Not on file     Gets together: Not on file     Attends Orthodox service: Not on file     Active member of club or organization: Not on file     Attends meetings of clubs or organizations: Not on file     Relationship status: Not on file     Intimate partner violence:     Fear of current or ex partner: Not on file     Emotionally abused: Not on file     Physically abused: Not on file     Forced sexual activity: Not on file   Other Topics Concern     Not on file   Social History Narrative     Not on file     No specialty comments available.  Review of Systems  General:  Denies problem  Eyes: Denies problem  Ears/Nose/Throat: Denies problem  Cardiovascular: Denies problem  Respiratory:  Denies problem  Gastrointestinal:  Denies problem  Genitourinary: Denies problem  Musculoskeletal:  Denies problem  Skin: Denies problem  Neurologic: Denies problem  Psychiatric: Denies problem  Endocrine: Denies problem  Heme/Lymphatic: Denies problem   Allergic/Immunologic: Denies problem        Objective:     Vitals:    04/23/19 0924   BP: 120/80   Pulse: (!) 57   SpO2: 99%   Weight: 220 lb 4 oz (99.9 kg)   Height: 6' 0.25\" (1.835 m)     Body mass index is 29.66 kg/m .    Physical  General Appearance: Alert, cooperative, no distress, appears stated age  Head: Normocephalic, without obvious abnormality, atraumatic  Eyes: PERRL, conjunctiva/corneas clear, EOM's intact  Ears: Normal TM's and external ear canals, both ears  Nose: Nares normal, septum midline,mucosa normal, no drainage  Throat: Lips, mucosa, and tongue normal; teeth and gums " normal  Neck: Supple, symmetrical, trachea midline, no adenopathy;  thyroid: not enlarged, symmetric, no tenderness/mass/nodules; no carotid bruit or JVD  Back: Symmetric, no curvature, ROM normal, no CVA tenderness  Lungs: Clear to auscultation bilaterally, respirations unlabored  Heart: Regular rate and rhythm, S1 and S2 normal, no murmur, rub, or gallop,  Abdomen: Soft, non-tender, bowel sounds active all four quadrants,  no masses, no organomegaly  Genitourinary: Penis normal. Right and left testis are descended.No palpable masses.   Rectal: No visible external lesion  Musculoskeletal: Normal range of motion. No joint swelling or deformity.   Extremities: Extremities normal, atraumatic, no cyanosis or edema  Skin: Skin color, texture, turgor normal, no rashes or lesions  Lymph nodes: Cervical, supraclavicular, and axillary nodes normal  Neurologic: He is alert. He has normal reflexes.   Psychiatric: He has a normal mood and affect.        MD Juancarlos Thomas was seen today for annual exam and heart problem.    Diagnoses and all orders for this visit:    Visit for preventive health examination    Family history of colon cancer in mother    Screening for endocrine, nutritional, metabolic and immunity disorder  -     Glycosylated Hemoglobin A1c  -     Comprehensive Metabolic Panel    Paroxysmal atrial fibrillation (H)  -     Thyroid Cascade  -     Magnesium  -     Ambulatory referral to Cardiology    Mixed hyperlipidemia  -     Lipid Cascade

## 2021-05-30 VITALS — BODY MASS INDEX: 30.34 KG/M2 | HEIGHT: 72 IN | WEIGHT: 224 LBS

## 2021-06-02 VITALS — HEIGHT: 72 IN | BODY MASS INDEX: 29.83 KG/M2 | WEIGHT: 220.25 LBS

## 2021-06-03 VITALS
DIASTOLIC BLOOD PRESSURE: 70 MMHG | HEIGHT: 73 IN | HEART RATE: 80 BPM | SYSTOLIC BLOOD PRESSURE: 124 MMHG | BODY MASS INDEX: 29.25 KG/M2 | WEIGHT: 220.7 LBS

## 2021-06-03 NOTE — PRE-PROCEDURE
Procedure Name: FREDDY  Date/Time: 11/12/2019 1:17 PM  Written consent obtained?: Yes  Risks and benefits: Risks, benefits and alternatives were discussed  Consent given by: patient  Expected level of sedation: moderate  ASA Class: Class 1- healthy patient  Mallampati: Grade 1- soft palate, uvula, tonsillar pillars, and posterior pharyngeal wall visible  Patient states understanding of procedure being performed: Yes  Patient's understanding of procedure matches consent: Yes  Procedure consent matches procedure scheduled: Yes  Appropriately NPO: yes  Lungs: lungs clear with good breath sounds bilaterally  Heart: normal heart sounds and rate  History & Physical reviewed: History and physical reviewed and no updates needed  Statement of review: I have reviewed the lab findings, diagnostic data, medications, and the plan for sedation

## 2021-06-03 NOTE — ANESTHESIA PREPROCEDURE EVALUATION
Anesthesia Evaluation      Patient summary reviewed   No history of anesthetic complications     Airway   Mallampati: I  Neck ROM: full   Pulmonary - negative ROS and normal exam                          Cardiovascular   Exercise tolerance: > or = 4 METS  (+) dysrhythmias (a.fib, a.flutter), ,     (-) murmur  ECG reviewed  Rhythm: irregular  Rate: abnormal,    no murmur      Neuro/Psych      Endo/Other - negative ROS      GI/Hepatic/Renal - negative ROS           Dental - normal exam                        Anesthesia Plan  Planned anesthetic: general mask and total IV anesthesia    ASA 2   Induction: intravenous   Anesthetic plan and risks discussed with: patient    Post-op plan: routine recovery

## 2021-06-03 NOTE — TELEPHONE ENCOUNTER
"Pt reports atrial flutter \"couple weeks ago.\"  \"Had cardioversion which worked well.\"  Now taking blood thinners.    HAS QUESTION ABOUT NEW STATIN RX:  Pt has been on new statin Rx x 3 weeks.  Feels concerned about recent lipid labs, stating \"I wasn't fasting when they were drawn.\"  Concerned lipid lab results were erroneous.  Asks if he really needs Crestor.    Advised pt to schedule OV to discuss this issue with Dr Ornelas.  Pt agrees to do so.  Warm transferred to a  for this purpose now.    In the interim, advised pt to continue taking Crestor as prescribed.  Pt agrees to do so.    Pt has OV scheduled for Monday, Nov 25th, 9:20 am.  Can pt come early for lab-draw before appt?  - Can orders be entered?    Pt plans to arrive early for appt.  Will be ready for lab-draw if orders can be entered.    Milly Martínez RN  [facility] Triage     Reason for Disposition    [1] Follow-up call from patient regarding patient's clinical status AND [2] information NON-URGENT     Pt asks to re-do lipid labs -> was not fasting when prior labs were drawn -> concerned results were erroneous.    Protocols used: PCP CALL - NO TRIAGE-A-AH      "

## 2021-06-03 NOTE — PATIENT INSTRUCTIONS - HE
1. Atypical atrial flutter (possibly originating in the left atrium) - plan transesophogeal echocardiogram to rule out clot in the heart then do cardioversion this week.  After cardioversion would continue eliquis for 4 week then stop and start aspirin 162mg daily.  If this returns would advise ablation of the rhythm.  2. Schedule stress nuclear imaging to screen for severe blockages in the heart arteries, if abnormal would do a coronary angiogram  3. Elevated LDL - would start rosuvastatin 20mg at night - watch for muscle aches if present can try adding coenzyme Q 10 200-400mg daily.

## 2021-06-03 NOTE — TELEPHONE ENCOUNTER
Patient Returning Call  Reason for call:  Patient called checking on status of below encounter  Information relayed to patient:  Writer relayed message from Dr Ornelas, patient verbalized understanding  Patient has additional questions:  Yes  If YES, what are your questions/concerns:  Before patient sets up appointment, he would like to know if any type of fasting would be needed or will Dr Ornelas discuss this with him at appointment?  Okay to leave a detailed message?: Yes

## 2021-06-03 NOTE — ANESTHESIA CARE TRANSFER NOTE
Last vitals:   Vitals:    11/12/19 1419   BP: 123/88   Pulse: 62   Resp: 19   Temp:    SpO2: 100%     Patient's level of consciousness is drowsy  Spontaneous respirations: yes  Maintains airway independently: yes  Dentition unchanged: yes  Oropharynx: oropharynx clear of all foreign objects    QCDR Measures:  ASA# 20 - Surgical Safety Checklist: WHO surgical safety checklist completed prior to induction    PQRS# 430 - Adult PONV Prevention: NA - Not adult patient, not GA or 3 or more risk factors NOT present  ASA# 8 - Peds PONV Prevention: NA - Not pediatric patient, not GA or 2 or more risk factors NOT present  PQRS# 424 - Deanna-op Temp Management: NA - MAC anesthesia or case < 60 minutes  PQRS# 426 - PACU Transfer Protocol:NA - Patient did not go to PACU  ASA# 14 - Acute Post-op Pain: ASA14B - Patient did NOT experience pain >= 7 out of 10

## 2021-06-03 NOTE — TELEPHONE ENCOUNTER
Please see if patient can be set up for PVI later this month with Dr. Hoover I did send OV note to his inbox yesterday).  I have ordered sotalol 40 mg twice daily.  Please instruct him to get an EKG after 5 doses.  If patient needs me to discuss with him, I would be happy to do so.

## 2021-06-03 NOTE — TELEPHONE ENCOUNTER
----- Message from Darlene Crowe sent at 12/2/2019  8:25 AM CST -----  Regarding: Afib  Caller: Juancarlos     Primary cardiologist: Dr. Juarez    Detailed reason for call: Recent cardioversion now in Afib, please call back.     Best phone number: [phone]     Best time to contact: Today    Ok to leave a detailed message? Yes    Device? No

## 2021-06-03 NOTE — TELEPHONE ENCOUNTER
Who is calling:  Patient.  Reason for Call:  States his cardiologist put him on a statin. Patient states he has not had a fasting cholesterol done. On the ones that were done, he did not fast for the labs.  Patient would like a second opinion if he really needs to go on a statin.   Please call and advise.  Date of last appointment with primary care: 4/23/2019  Okay to leave a detailed message: Yes

## 2021-06-03 NOTE — TELEPHONE ENCOUNTER
"Return call to patient. Patient reports that he would like to move up his currently scheduled appointment with Brittanie Marlow. He is scheduled tomorrow morning for visit but states his has gone back into \"arryhthmia\" and is uncomfortable and would like to be seen today if possible.     Reviewed symptoms with patient. He  currently complains of \"fluttering\" in his chest and notes his heart rate is around 80 bpm. He states he feels \"woosy\" but is able to \"work through it\". Patient eager to discuss potential ablation and other treatment options. His appointment was rescheduled for this afternoon. -ejb  "

## 2021-06-03 NOTE — PROGRESS NOTES
OFFICE VISIT - FAMILY MEDICINE     ASSESSMENT AND PLAN     1. Mixed hyperlipidemia  Lipid [place]    Comprehensive Metabolic Panel    Lipoprotein A    High Sensitivity C-Reactive Protein(hsCR   2. Overweight (BMI 25.0-29.9)  Lipid Cascade    Comprehensive Metabolic Panel    Lipoprotein A    High Sensitivity C-Reactive Protein(hsCR   Hyperlipidemia, we did review previous lipid panels, we did review ASCVD calculation, 10-year cardiovascular risk is low, 2-3 %, we discussed healthy lifestyle changes, we are doing a lipid panel today, expecting results to be better compared to previous, one option would be to stay on the medication, though to one could possibly hold on the statin, have the patient work harder on  lifestyle changes and recheck in 6 months.  If persistently elevated at that time we will look at lifelong treatment with statin.    CHIEF COMPLAINT   Follow-up (cardioversion; discuss statin therapy)    HPI   Juancarlos Causey is a 44 y.o. male.  Updated MIIC - No Records  Currently being followed by cardiologist for A. Fib with  possible a flutter, rate appears controlled, is on anticoagulation, planning to do ablation down the road.  Denies any chest pain no shortness of breath.  Mild hyperlipidemia, was started on Crestor 20 mg by cardiologist, is been taking for the past 20 days, but concerned about possible side effect.  LDL has been mildly elevated in the past, risk calculation is below.    The 10-year ASCVD risk score (Justin JALIL Jr., et al., 2013) is: 1.4%    Values used to calculate the score:      Age: 44 years      Sex: Male      Is Non- : No      Diabetic: No      Tobacco smoker: No      Systolic Blood Pressure: 118 mmHg      Is BP treated: No      HDL Cholesterol: 43 mg/dL      Total Cholesterol: 143 mg/dL      Review of Systems As per HPI, otherwise negative.    OBJECTIVE   /70 (Patient Site: Left Arm, Patient Position: Sitting, Cuff Size: Adult Regular)   Pulse  (!) 51   Wt 220 lb (99.8 kg)   SpO2 99%   BMI 29.03 kg/m    Physical Exam   Constitutional: He is oriented to person, place, and time. He appears well-developed and well-nourished.   HENT:   Head: Normocephalic and atraumatic.   Neck: Normal range of motion. Neck supple. No JVD present. No tracheal deviation present. No thyromegaly present.   Cardiovascular: Normal rate, regular rhythm, normal heart sounds and intact distal pulses. Exam reveals no gallop and no friction rub.   No murmur heard.  Pulmonary/Chest: Effort normal and breath sounds normal. No respiratory distress. He has no wheezes. He has no rales.   Musculoskeletal:         General: No tenderness or edema.   Lymphadenopathy:     He has no cervical adenopathy.   Neurological: He is alert and oriented to person, place, and time. Coordination normal.   Psychiatric: He has a normal mood and affect. Judgment and thought content normal.       PFSH     Family History   Problem Relation Age of Onset     Breast cancer Mother      Colon cancer Mother      Diabetes Father      Atrial fibrillation Father      Arthritis Maternal Grandmother      Social History     Socioeconomic History     Marital status:      Spouse name: Not on file     Number of children: Not on file     Years of education: Not on file     Highest education level: Not on file   Occupational History     Not on file   Social Needs     Financial resource strain: Not on file     Food insecurity:     Worry: Not on file     Inability: Not on file     Transportation needs:     Medical: Not on file     Non-medical: Not on file   Tobacco Use     Smoking status: Never Smoker     Smokeless tobacco: Never Used   Substance and Sexual Activity     Alcohol use: Yes     Frequency: 4 or more times a week     Drug use: Never     Sexual activity: Yes   Lifestyle     Physical activity:     Days per week: Not on file     Minutes per session: Not on file     Stress: Not on file   Relationships     Social  connections:     Talks on phone: Not on file     Gets together: Not on file     Attends Latter day service: Not on file     Active member of club or organization: Not on file     Attends meetings of clubs or organizations: Not on file     Relationship status: Not on file     Intimate partner violence:     Fear of current or ex partner: Not on file     Emotionally abused: Not on file     Physically abused: Not on file     Forced sexual activity: Not on file   Other Topics Concern     Not on file   Social History Narrative     Not on file     Relevant history was reviewed with the patient today, unless noted in HPI, nothing is pertinent for this visit.  [facility]     Patient Active Problem List    Diagnosis Date Noted     Family history of colon cancer in mother 01/20/2017     Obesity (BMI 30-39.9) 01/20/2017     Past Surgical History:   Procedure Laterality Date     HERNIA REPAIR Bilateral        RESULTS/CONSULTS (Lab/Rad)     Recent Results (from the past 168 hour(s))   Lipid Cascade   Result Value Ref Range    Cholesterol 143 <=199 mg/dL    Triglycerides 62 <=149 mg/dL    HDL Cholesterol 43 >=40 mg/dL    LDL Calculated 88 <=129 mg/dL    Patient Fasting > 8hrs? Yes    Comprehensive Metabolic Panel   Result Value Ref Range    Sodium 140 136 - 145 mmol/L    Potassium 4.8 3.5 - 5.0 mmol/L    Chloride 105 98 - 107 mmol/L    CO2 27 22 - 31 mmol/L    Anion Gap, Calculation 8 5 - 18 mmol/L    Glucose 93 70 - 125 mg/dL    BUN 9 8 - 22 mg/dL    Creatinine 0.77 0.70 - 1.30 mg/dL    GFR MDRD Af Amer >60 >60 mL/min/1.73m2    GFR MDRD Non Af Amer >60 >60 mL/min/1.73m2    Bilirubin, Total 0.7 0.0 - 1.0 mg/dL    Calcium 9.4 8.5 - 10.5 mg/dL    Protein, Total 6.7 6.0 - 8.0 g/dL    Albumin 4.2 3.5 - 5.0 g/dL    Alkaline Phosphatase 54 45 - 120 U/L    AST 27 0 - 40 U/L    ALT 29 0 - 45 U/L   High Sensitivity C-Reactive Protein(hsCR   Result Value Ref Range    CRP, High Sensitivity 0.7 0.0 - 3.0 mg/L     Nm Exercise Stress Test    Result  Date: 11/14/2019     The nuclear stress test is negative for inducible myocardial ischemia or infarction.    The left ventricular ejection fraction at stress is 52%.    There is no prior study for comparison.     MEDICATIONS     Current Outpatient Medications on File Prior to Visit   Medication Sig Dispense Refill     ELIQUIS 5 mg Tab tablet   2     rosuvastatin (CRESTOR) 20 MG tablet Take 1 tablet (20 mg total) by mouth at bedtime. 90 tablet 11     No current facility-administered medications on file prior to visit.        HEALTH MAINTENANCE / SCREENING   PHQ-2 Total Score: 0 (4/23/2019  9:22 AM)  , No data recorded,No data recorded  Immunization History   Administered Date(s) Administered     Influenza [id number], 11/06/2009     Influenza, inj, historic,unspecified 10/02/2009     Influenza, seasonal,quad inj 6-35 mos 10/01/2012     Influenza,seasonal quad, PF, =/> 6months 09/23/2019     Tdap 01/20/2017     Health Maintenance   Topic     HIV SCREENING      PREVENTIVE CARE VISIT      ADVANCE CARE PLANNING      LIPID      TD 18+ HE      INFLUENZA VACCINE RULE BASED      TDAP ADULT ONE TIME DOSE        Jerome Ornelas MD  Family Medicine, [facility]     This note was dictated using a voice recognition software.  Any grammatical or context distortion are unintentional and inherent to the software.

## 2021-06-03 NOTE — TELEPHONE ENCOUNTER
Who is calling:  Patient   Reason for Call:  Patient states his cardiologist put him on Statin on relaying the lab results which was drawn not fasting for lab work . Patient would like to discuss with  about taking statin medication he does not want to be taking long . Writer relay  message to make an appointment . Transferred patient call to scheduling.  Date of last appointment with primary care: unknown  Okay to leave a detailed message: Jerome Kang MDPhysicianSigned  11/18/2019                Will discuss at the appointment

## 2021-06-03 NOTE — ANESTHESIA POSTPROCEDURE EVALUATION
Patient: Juancarlos Causey  * No procedures listed *  Anesthesia type: general    Patient location: Telemetry/Step Down Unit  Last vitals: No vitals data found for the desired time range.    Post vital signs: stable  Level of consciousness: awake, alert and responds to simple questions  Post-anesthesia pain: pain controlled  Post-anesthesia nausea and vomiting: no  Pulmonary: unassisted, return to baseline  Cardiovascular: stable and blood pressure at baseline  Hydration: adequate  Anesthetic events: no    QCDR Measures:  ASA# 11 - Deanna-op Cardiac Arrest: ASA11B - Patient did NOT experience unanticipated cardiac arrest  ASA# 12 - Deanna-op Mortality Rate: ASA12B - Patient did NOT die  ASA# 13 - PACU Re-Intubation Rate: NA - No ETT / LMA used for case  ASA# 10 - Composite Anes Safety: ASA10A - No serious adverse event    Additional Notes:

## 2021-06-03 NOTE — TELEPHONE ENCOUNTER
Left message to call back for: Patient  Information to relay to patient:  Please schedule OV w/Dr. Ornelas.

## 2021-06-03 NOTE — TELEPHONE ENCOUNTER
Left message to call back for: Patient  Information to relay to patient:  Please schedule lab appointment for patient, if needed.

## 2021-06-04 VITALS
SYSTOLIC BLOOD PRESSURE: 96 MMHG | HEIGHT: 73 IN | HEART RATE: 64 BPM | RESPIRATION RATE: 16 BRPM | DIASTOLIC BLOOD PRESSURE: 70 MMHG | BODY MASS INDEX: 29.16 KG/M2 | WEIGHT: 220 LBS

## 2021-06-04 VITALS — HEIGHT: 73 IN | WEIGHT: 221.03 LBS | BODY MASS INDEX: 29.29 KG/M2

## 2021-06-04 VITALS
BODY MASS INDEX: 29.03 KG/M2 | DIASTOLIC BLOOD PRESSURE: 70 MMHG | HEART RATE: 51 BPM | WEIGHT: 220 LBS | OXYGEN SATURATION: 99 % | SYSTOLIC BLOOD PRESSURE: 118 MMHG

## 2021-06-04 VITALS
HEIGHT: 73 IN | RESPIRATION RATE: 18 BRPM | HEART RATE: 66 BPM | BODY MASS INDEX: 28.49 KG/M2 | WEIGHT: 215 LBS | SYSTOLIC BLOOD PRESSURE: 126 MMHG | DIASTOLIC BLOOD PRESSURE: 82 MMHG

## 2021-06-04 VITALS
DIASTOLIC BLOOD PRESSURE: 80 MMHG | HEART RATE: 92 BPM | BODY MASS INDEX: 29.03 KG/M2 | RESPIRATION RATE: 14 BRPM | SYSTOLIC BLOOD PRESSURE: 110 MMHG | HEIGHT: 73 IN | WEIGHT: 219 LBS

## 2021-06-04 VITALS
HEIGHT: 73 IN | RESPIRATION RATE: 16 BRPM | BODY MASS INDEX: 29.38 KG/M2 | SYSTOLIC BLOOD PRESSURE: 116 MMHG | DIASTOLIC BLOOD PRESSURE: 80 MMHG | WEIGHT: 221.7 LBS | HEART RATE: 56 BPM

## 2021-06-04 NOTE — TELEPHONE ENCOUNTER
Medication Question or Clarification  Who is calling: Patient  What medication are you calling about? (include dose and sig) generic Crestor 20 mg daily  Who prescribed the medication?: Cardiologist  What is your question/concern?: The patient stated Dr Ornelas had talked to the cardiologist and left a message in [employer] Patient relays Dr Ornelas is discussing decreasing to 10 mg with the agreement of cardiologist.  Patient wants to discuss stopping all together. Patient is of the mindset the only reason he was started on Crestor was based on reading of cholesterol when he was not fasting He feels it was not needed nor is now.  Please advise.  Pharmacy:[facility]   Okay to leave a detailed message?: Yes [phone]   Patient relays he  is making sure he has  first name and last name on his vm so please leave details.   Site CMT - Please call the pharmacy to obtain any additional needed information.

## 2021-06-04 NOTE — TELEPHONE ENCOUNTER
Called patient and reviewed recommendations per Dr. Juarez. He notes he has already discussed with PCP and is holding medication. He will concentrate on lifestyle changes, have CT Calcium Score in 3-4 months after re-testing lipids and decide whether statins are really needed at that time. -jolanta

## 2021-06-04 NOTE — ANESTHESIA POSTPROCEDURE EVALUATION
Patient: Juancarlos Causey  EP Ablation PVI - 530AM ADMIT, GEN ANES - WHOLE CASE, NO DEV, MALCOLM - NOTIFIED 12/4, 4HRS W/ICE, H&P - 12/26, TEACH - EP RN, EP Ablation Atrial Flutter  Anesthesia type: general    Patient location: Telemetry/Step Down Unit  Last vitals:   Vitals Value Taken Time   /74 12/31/2019  7:49 PM   Temp 36.4  C (97.6  F) 12/31/2019  7:49 PM   Pulse 59 12/31/2019  8:16 PM   Resp 18 12/31/2019  7:49 PM   SpO2 96 % 12/31/2019  7:49 PM   Vitals shown include unvalidated device data.  Post vital signs: stable  Level of consciousness: awake and responds to simple questions  Post-anesthesia pain: pain controlled  Post-anesthesia nausea and vomiting: no  Pulmonary: unassisted, return to baseline  Cardiovascular: stable and blood pressure at baseline  Hydration: adequate  Anesthetic events: no    QCDR Measures:  ASA# 11 - Deanna-op Cardiac Arrest: ASA11B - Patient did NOT experience unanticipated cardiac arrest  ASA# 12 - Deanna-op Mortality Rate: ASA12B - Patient did NOT die  ASA# 13 - PACU Re-Intubation Rate: ASA13B - Patient did NOT require a new airway mgmt  ASA# 10 - Composite Anes Safety: ASA10A - No serious adverse event    Additional Notes:

## 2021-06-04 NOTE — TELEPHONE ENCOUNTER
Left message to call back for: Patient  Information to relay to patient:  Left detailed on patient's personal VM of message per MD. Please find out when patient calls back with which option he would like to do. CT calcium score cost is $200 out of pocket-insurance generally doesn't cover.

## 2021-06-04 NOTE — TELEPHONE ENCOUNTER
PC from pt   He reports having 1 suture in place in both the left and the right groin site  He states that he is reporting that there is bruising at both the left and right groin site, and is wondering if this is normal  Pt denies any drainage from the groin site, denies any hardening at the groin sites, denies pain, groins are soft, and pt denies any further changes  Reviewed with pt normal healing process s/p ablation  Pt will continue with f/u s/p ablation tomorrow 1/3/19  Pt verbalized understanding  Elba  ----- Message from Steph Escobar RN sent at 1/2/2020  3:19 PM CST -----  Regarding: Post-ablation question    ----- Message -----  From: Darlene Crowe  Sent: 1/2/2020   3:11 PM CST  To: Steph Escobar, RN  Subject: Ann                                          Caller: Juancarlos    Primary cardiologist: Dr. Juarez    Detailed reason for call: Has concern regarding suture / wound site, he is post ablation 12/31/19. Please call back.     Best phone number: [phone]    Best time to contact: Today    Ok to leave a detailed message? Yes    Device? No

## 2021-06-04 NOTE — TELEPHONE ENCOUNTER
"PC to pt to discuss starting Sotalol prior to PVI  Pt states that he went back into SR late Monday evening 12/2/19  He feels that his heart is \"unsteady and feels like he could go back into AF at any time\"  He states that he is able to tolerate his symptoms while in AF, and his HR is controlled while in AF with HRs in the 80's  Last date in Dec for ablation with Dr Hoover is 12/31, which pt will be offered  Pts schedule is limiting with scheduling an EKG after starting Sotalol  Pt is currently out of town from 12/5/19-12/16/19  If pt were to start on Sotalol requiring EKG after 5 doses, pt could start on 12/16 when he returns, placing pt on Sotalol for < 2 wks prior to PVI  I know you mentioned Flecainide and BB could not be used, with evidence of bradycardia  Would you still like pt to start Sotalol or alternate medication prior to PVI?  Please advise  Thank you,  Elba    "

## 2021-06-04 NOTE — PROGRESS NOTES
Post Procedural PVI Follow Up Visit    Pt is seen in clinic today status post PVI with Dr Hoover on 12/31/19.     General Assessment:   Pts vital signs stable, weight compared to pre procedural weight and is stable, lung sounds clear, heart sounds S1 and S2 present, palpable radial and pedal pulses and no edema/fluid retention noted.   Pt denies generalized or localized pain abnormal to healing s/p, difficulty swallowing, SOB, thoat pain, heart burn, chest pain, urinary retention/difficulty and s/s of infection.  Pt is tolerating advancement in activity well, staying well hydrated, has a good appetite, eating well balanced meals and gradually working into baseline activity.     Rhythm Assessment:   Pt denies symptoms or sustained AF episodes.    Procedure Site Assessment:   Pts right groin has 2 puncture sites, is C/D/I, no drainage, moderate amount of bruising noted around puncture sites, 1 suture in place, suture removed, groin is soft, and pt denies pain at the site. Left groin has 1 puncture sites, is C/D/I, no drainage, small amount of amount of bruising noted around puncture site, 1 suture in place, sutures removed, groin is soft, and pt denies pain at the site. No bruits we heard bilaterally, and pt has strong bilateral femoral and pedal pulses present. All puncture sites were left open to air.    Anticoagulation/Medication:  Pt was instructed to remain on Eliquis without interruption until seen for 3mo follow-up, for further instructions/managment.  Reviewed with pt importance of anticoagulation s/p PVI, reviewed with pt s/s of bleeding while on anticoagulation s/p PVI and encouraged to call with any questions or concerns about anticoagulants  Pt will continue ASA 81mg Daily for 1 mo s/p PVI    Education completed with pt at this visit:  Reviewed post-op PVI healing process, follow up office visit requirements, physical restrictions, nutritional requirements, when to contact EP-RN/EP-MD, contact information  was given to the pt for further concerns or questions and pt verbalized understanding    1/3/2020 2:03 PM  Kiarra Burkett RN

## 2021-06-04 NOTE — TELEPHONE ENCOUNTER
----- Message -----  From: Neymar Juarez MD  Sent: 12/5/2019   5:09 PM CST  To: Andria Montenegro RN    LDL is not that low only 88, would advise continuing crestor.    Would advocate diet/exercise, repeat lipids in 6 months and if LDL dramatically lower would lower crestor.

## 2021-06-04 NOTE — TELEPHONE ENCOUNTER
PC back to wife   Reviewed post-op PVI healing process, follow up office visit requirements, physical restrictions, contact information was given to the pt for further concerns or questions and pt verbalized understanding.  Also reviewed travel restrictions s/p, and expected recovery time.  12/17/2019 9:58 AM  Stephanie Ferrera RN    ----- Message from Steph Escobar RN sent at 12/17/2019  9:40 AM CST -----  Regarding: FW: Ann De La Paz  EP related question. . .  ----- Message -----  From: Kirsten Robison  Sent: 12/17/2019   9:20 AM CST  To: Steph Escobar RN  Subject: Ann De La Paz                                       General phone call:    Caller: Wife Lizzette  Primary cardiologist: Ann   Detailed reason for call: Lizzette is calling with questions regarding her husbands upcoming ablation to be done by Kiko on 12/31/19. They have travel plans over the holidays and she is wondering if it is possible to travel soon after the ablation    Best phone number: [phone] Lizzette  Best time to contact: any  Ok to leave a detailed message? yes  Device? no    Additional Info:

## 2021-06-04 NOTE — PROGRESS NOTES
PVI education was completed:     See documented H&P completed by NP in EPIC    Reviewed pre and post op PVI procedure with pt, pre-procedure letter reviewed and given to pt- see letter in EPIC under documentation, see additional EP PVI prep note for details on procedure/prep, answered pt questions and concerns regarding procedure, time spent with pt was >30min and reviewed available pre-op lab results    Discussed importance of continuing anticoagulation uninterrupted pre and post ablation, pt denies missing any doses of their anticoagulant, pt denies issues with fermin placement in the past, instructions given to pt to start Pepcid 20mg BID 3 days prior to PVI, and to continue 3 wks s/p PVI and perscription was sent for pepcid 20mg BID  12/26/2019 9:45 AM  Stephanie Ramos RN

## 2021-06-04 NOTE — TELEPHONE ENCOUNTER
Phone call from patient wondering about the timing of his MRI.  Pt is scheduled for an MRI of his pulmonary veins on 1-30-20.  Will need to have MRI scheduled prior to ablation on 12-31-19.  Phone call to Radiology and central scheduling, pt will be contacted for earlier apt and it will be prior to his ablation.    Pt also notes some concern with his cholesterol and statin.  He states that his cholesterol has been high, noted on multiple lab visits and when he saw Dr. Juarez in clinic on 11-11-19 he was started on Crestor 20 mg.    His PMD checked another lipid panel on 11-25-19 and his cholesterol had dramatically decreased.  He wonders if it is due to the fact that this was a fasting sample or if it could truly be related to starting a statin.  He is uncomfortable with taking medication and would like to possibly try a lower dose or even stop medication and try another fasting lab in the future with modified lifestyle.    Will review with Dr. Juarez and his nurse.  Pt states understanding and reviewed contact information.

## 2021-06-04 NOTE — TELEPHONE ENCOUNTER
----- Message from Steph Escobar RN sent at 12/5/2019  9:38 AM CST -----  Regarding: FW: Question about the MRI  MR ordered by Dr. Hoovre.  ----- Message -----  From: Jana Armijo  Sent: 12/5/2019   9:19 AM CST  To: Steph Escobar RN  Subject: Question about the MRI                           Caller: Patient    Primary cardiologist: Joe Marlow    Detailed reason for call: Patient stated that he is having an ablation on 12/31/19 and was wondering if his MR that was scheduled at 1/30/20 ok or does it need to be done before the surgery?    Best phone number: [phone]    Best time to contact: Today     Ok to leave a detailed message? Yes    Device? No    Additional Info:

## 2021-06-04 NOTE — PATIENT INSTRUCTIONS - HE
Your anticoagulation medication Eliquis:    It is important to remain on your anticoagulation medication uninterrupted after your ablation to reduce your risk of a stroke or heart attack, do not stop this medication    Please remain on your aspirin for 1 month, then you can stop (You may stop your Pepcid at this time as well.)    Healing from your pulmonary vein ablation:    Stay well hydrated, and increase your fluid intake during this recovery period    High protein foods aide in your bodies healing process    No aggressive or aerobic activity for 7-10 days, and do not lift more than 10 pounds for 7 days     Increase your activity gradually over the next 5-10 days, working back to your normal daily activity    Please call me if any of the following occur:    Episodes of Atrial Fibrillation lasting greater than 4 hours, or if you notice the episodes are increasing in frequency or duration    If you develop shortness of breath, dizziness, or unresolving chest pains     Changes at your groin sites including swelling, hardening, drainage, increase in bruising, or an increase in pain    If you develop a temperature greater than 100.5 degrees (especially weeks 2-5 post     Procedure)      Call 911 if you are having symptoms of a stroke; difficulty with your speech, problems walking, difficulty with balance, vision disturbances, facial drooping or numbness, and muscle weakness on one side of your body     Your follow up appointments are as follows:    You will be seen by the electrophysiologist nurse practitioner in 6 weeks    You will have a 14 day ambulatory cardiac monitor in 3 months to assess your rhythm, this will be scheduled at your appointment with the electrophysiologist nurse practitioner    You will be seen by the electrophysiologist nurse practitioner again in 3 months    Sincerely,  Kiarra Burkett RN [phone]    After hours please contact the on call service at # [phone]

## 2021-06-04 NOTE — ANESTHESIA CARE TRANSFER NOTE
Last vitals:   Vitals:    12/31/19 1354   BP: 117/79   Pulse: 77   Resp: 16   Temp: (!) 2.9  C (37.2  F)   SpO2: 98%     Patient's level of consciousness is awake and drowsy  Spontaneous respirations: yes  Maintains airway independently: yes  Dentition unchanged: yes  Oropharynx: oropharynx clear of all foreign objects    QCDR Measures:  ASA# 20 - Surgical Safety Checklist: WHO surgical safety checklist completed prior to induction    PQRS# 430 - Adult PONV Prevention: 4558F - Pt received => 2 anti-emetic agents (different classes) preop & intraop  ASA# 8 - Peds PONV Prevention: NA - Not pediatric patient, not GA or 2 or more risk factors NOT present  PQRS# 424 - Deanna-op Temp Management: 4559F - At least one body temp DOCUMENTED => 35.5C or 95.9F within required timeframe  PQRS# 426 - PACU Transfer Protocol: - Transfer of care checklist used  ASA# 14 - Acute Post-op Pain: ASA14B - Patient did NOT experience pain >= 7 out of 10

## 2021-06-04 NOTE — TELEPHONE ENCOUNTER
PC back to pt  Again d/t limited scheduling and him being out of town at several points throughout the month, pt is not able to confirm that EKG would be able to be completed in the needed timeframe  Discussed 12/31 for the date of the ablation, which pt seems to be agreeable to  He did further state that he also has a plan to be leaving out of town on 12/27, and would like to confirm PVI date or even possibility of moving up his PVI, prior to cnx his travel plans for the end of the month  Discussed with pt there was not an alternative medication at this time, d/t concerns of bradycardia  Pt will not start on Sotalol at this time  Advised pt if his plans change with in the next 1-2 wks, that would allow him to obtain an EKG 3 days or 5 doses after starting Sotalol  Pt is agreeable and verbalized understanding  Elba

## 2021-06-04 NOTE — PATIENT INSTRUCTIONS - HE
famotidine (Pepcid). Take 20 mg two times a day starting on 12/28/2019, you will continue this for 3 weeks post ablation.

## 2021-06-04 NOTE — TELEPHONE ENCOUNTER
I would not recommend starting the sotalol on 12/16/19. If patient can make some arrangements to get an EKG and get it faxed to us and call us for follow-up we could have him start the sotalol 40 mg twice daily.  There is really no alternative as amiodarone is too long-acting, beta-blocker such as metoprolol cannot be used with flecainide as I will slow his heart rate too great.  He is obviously not a candidate for Tikosyn loading.  Pro-path known would also likely cause significant bradycardia.    Let me know if patient still needs to speak with me

## 2021-06-04 NOTE — TELEPHONE ENCOUNTER
As we previously discussed, we can have him stop the medication, work on healthy lifestyle changes, and recheck in about 3 to 4 months.  Another option would be to do a CT calcium score of his coronary arteries, but downside is that this test is expensive, he gets radiation exposure;  but that can give us an idea if he does have some coronary obstruction and needs to be on a statin independently of his cholesterol.

## 2021-06-07 NOTE — PROGRESS NOTES
Review of Systems - History obtained from the patient  General ROS: negative  Psychological ROS: negative  Ophthalmic ROS: negative  ENT ROS: negative  Hematological and Lymphatic ROS: negative  Respiratory ROS: negative  Cardiovascular ROS: negative  Gastrointestinal ROS: negative  Genito-Urinary ROS: negative  Musculoskeletal ROS: negative  Neurological ROS: negative  Dermatological ROS: negative

## 2021-06-08 NOTE — PROGRESS NOTES
ASSESSMENT & PLAN:  1. Visit for preventive health examination  2. Family history of colon cancer in mother  - Ambulatory referral for Colonoscopy  3. Screen for colon cancer  - Ambulatory referral for Colonoscopy  4. Obesity (BMI 30-39.9)  5. Screening for endocrine, nutritional, metabolic and immunity disorder  - Comprehensive Metabolic Panel  - HM2(CBC w/o Differential)  - Glycosylated Hemoglobin A1c  - High Sensitivity C-Reactive Protein(hsCR  - Thyroid Stimulating Hormone (TSH)  - Lipid Cascade RANDOM  6. Overweight (BMI 25.0-29.9)  - Glycosylated Hemoglobin A1c    There are no Patient Instructions on file for this visit.    Orders Placed This Encounter   Procedures     Tdap vaccine,  8yo or older,  IM     Ambulatory referral for Colonoscopy     Referral Priority:   Routine     Referral Type:   Colonoscopy     Referral Reason:   Evaluation and Treatment     Requested Specialty:   Gastroenterology     Number of Visits Requested:   1     Medications Discontinued During This Encounter   Medication Reason     amoxicillin (AMOXIL) 500 MG tablet Therapy completed     ciprofloxacin-dexamethasone (CIPRODEX) otic suspension Therapy completed       No Follow-up on file.    Patient Counseling:  -Nutrition: Stressed importance of moderation in sodium/caffeine intake, saturated fat and cholesterol, caloric balance, sufficient intake of fresh fruits, vegetables, fiber, calcium, iron.  -Exercise: Stressed the importance of regular exercise.   -Dental health: Discussed importance of regular tooth brushing, flossing, and dental visits.  -Immunizations reviewed.  -Discussed timing and benefits of screening colonoscopy and alternative options.  -After hours service discussed with patient      CHIEF COMPLAINT:  Chief Complaint   Patient presents with     Annual Exam     TDAP? NO CONCERNS      - STEPHANY-0       HISTORY OF PRESENT ILLNESS:  Juancarlos is a 41 y.o. male patient of Dr. Cespedes presenting to the clinic today for an annual  physical exam. He has no concerns today. His last physical exam was about 10 years ago.    Health Maintenance: His mother had colon cancer in her 40s. He is willing to have a colonoscopy. He received the tetanus vaccine today.    REVIEW OF SYSTEMS:   He had Afib in the past with abnormal EKG and was referred to a cardiologist. The cardiologist diagnosed it as paroxysmal Afib and stated intervention was not needed. He wears contacts and sees an eye doctor. He denies vision and hearing changes, joint pain, abdominal pain, diarrhea, and constipation. All other systems are negative.    PFSH:  Healthy Habits:   Regular Exercise: He exercises regularly, but sits a lot during work.  Healthy Diet: He is trying to eat smaller portions and healthier.  Dental Visits Regularly: Yes.    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  Social History     Social History     Marital status:      Spouse name: N/A     Number of children: N/A     Years of education: N/A     Occupational History     Not on file.     Social History Main Topics     Smoking status: Never Smoker     Smokeless tobacco: Not on file     Alcohol use Not on file     Drug use: Not on file     Sexual activity: Not on file     Other Topics Concern     Not on file     Social History Narrative       Surgical History:  Past Surgical History   Procedure Laterality Date     Hernia repair Bilateral        Allergies:  No Known Allergies    Problem List:  Active Ambulatory Problems     Diagnosis Date Noted     Family history of colon cancer in mother 01/20/2017     Obesity (BMI 30-39.9) 01/20/2017     Resolved Ambulatory Problems     Diagnosis Date Noted     No Resolved Ambulatory Problems  "    No Additional Past Medical History       VITALS:  Vitals:    01/20/17 0752   BP: 130/80   Patient Site: Right Arm   Pulse: 68   Resp: 13   Temp: 98.8  F (37.1  C)   TempSrc: Oral   Weight: (!) 224 lb (101.6 kg)   Height: 6' 0.3\" (1.836 m)     Wt Readings from Last 3 Encounters:   01/20/17 (!) 224 lb (101.6 kg)   02/03/15 213 lb (96.6 kg)     Body mass index is 30.13 kg/(m^2).    PHYSICAL EXAM:  General Appearance: Alert, cooperative, no distress, appears stated age  Head: Normocephalic, without obvious abnormality, atraumatic  Eyes: PERRL, conjunctiva/corneas clear, EOM's intact  Ears: Normal TM's and external ear canals, both ears  Throat: Lips, mucosa, and tongue normal; teeth and gums normal  Neck: Supple, symmetrical, trachea midline, no adenopathy; thyroid: not enlarged, symmetric, no tenderness/mass/nodules  Back: Symmetric, no curvature, ROM normal  Lungs: Clear to auscultation bilaterally, respirations unlabored  Heart: Regular rate and rhythm, S1 and S2 normal, no murmur, rub, or gallop  Abdomen: Soft, non-tender, bowel sounds active all four quadrants,  no masses, no organomegaly  Genitourinary: Penis normal. Right and left testis are descended. No palpable masses.   Rectal: No visible external lesion.  Musculoskeletal: Normal range of motion. No joint swelling or deformity.   Extremities: Extremities normal, atraumatic, no cyanosis or edema  Skin: Small moles on the back, abdomen  Neurologic: CN II-XII intact.   Psychiatric: He has a normal mood and affect.     QUALITY MEASURES:  The following high BMI interventions were performed this visit: encouragement to exercise, weight monitoring, exercise promotion: strength training, exercise promotion: stretching and lifestyle education regarding diet    ADDITIONAL HISTORY SUMMARIZED (2): None.  DECISION TO OBTAIN EXTRA INFORMATION (1): None.   RADIOLOGY TESTS (1): None.  LABS (1): Labs ordered.  MEDICINE TESTS (1): Colonoscopy ordered.  INDEPENDENT REVIEW " (2 each): None.     The visit lasted a total of 14 minutes face to face with the patient. Over 50% of the time was spent counseling and educating the patient about diet, exercise, immunizations.    I, Kathrin Otero, am scribing for and in the presence of, Dr. Ornelas.    I, Dr. Ornelas, personally performed the services described in this documentation, as scribed by Kathrin Otero in my presence, and it is both accurate and complete.    Dragon dictation was used for this note.  Speech recognition errors are a possibility.    MEDICATIONS:  No current outpatient prescriptions on file.     No current facility-administered medications for this visit.        Total data points: 2

## 2021-06-09 NOTE — PROGRESS NOTES
"ASSESSMENT AND PLAN:     COUNSELING:   Reviewed preventive health counseling, as reflected in patient instructions       Regular exercise       Healthy diet/nutrition       Consider Hep C screening for all patients one time for ages 18-79 years    (Z00.00) Visit for well [place] health check  (primary encounter diagnosis)  Comment: Age appropriate screening and preventive services provided.   Update lipids off of statin for over a year now.  Update A1c due to weight gain  Strategized weight loss plan.  Provided [place] honoring choices fform.   Plan: Lipid Panel ([place]), Hemoglobin A1c ([place]), Hepatitis C Screen Reflex to HCV RNA         Quant and Genotype          (I48.0) PAF (paroxysmal atrial fibrillation) (H)  Comment: Stable. Continues to have palpitations daily. Encouraged follow up with EP cardiology - overdue slightly. Refilled aspirin for stroke prevention.   Plan: aspirin (ASA) 81 MG chewable tablet          (E78.5) Dyslipidemia  Comment: Update lipids.   Plan:     Shyam Pierce MD  [facility]  06/11/2021, 8:37 AM      SUBJECTIVE:   Juancarlos Causey is a 45 year old male who presents to clinic today for an annual wellness exam.    # Paroxysmal A Fib  - s/p ablation 12/31/19  - has continued to have daily palpitations - 5-6 beats \"heavy hearteats\"  - previous cardiologist Dr. Hoover felt that they correlated with PACs on previous monitoring  - recommended indefinite aspirin 81mg daily after finishing Eliquis    # Dyslipidemia  - 1/20/17 Chol 272, TGY 74, HDL 53,   - 4/23/19 Chol 251, TGY 81, HDL 57,    - was on Rosuvastatin 20mg then 10mg for about 3 weeks starting in November 2019  - tolerated well during this time  - 11/25/19 Chol 143, TGY 62, HDL 43, LDL 88     - stopped Rosuvastatin in mid-December 2019  - 2/3/20 Fasting lipids: Chol 201, TGY 97, HDL 53,   - decided to stay off of rosuvastatin at that time     - 11/25/19 Lipo(a) 17mg/dl (ref range " <=29mg/dl)  - 17 hsCRP 0.4  - 19 hsCRP 0.7   - 19 A1c 5.4%    # Health Maintenance  - HIV Screenin/3/20 nonreactive  - Hep C Screening: do today  - BP:   BP Readings from Last 3 Encounters:   21 138/82   20 126/82   20 121/77   - Diabetes Screenin19 A1c 5.4%  - Colonoscopy: 19, recommended repeat 3 years. Family history of colon cancer.     - has put on weight this past year  - attributes some of this to eating at dining ngo in [employer]  - [place] program stopped now so easier to avoid dining ngo  - start skipping snack with evening wine    - Exercise: running or biking 5 days a week    Today's PHQ-2 Score:   PHQ-2 (  [employer]) 2021 2/3/2020   Q1: Little interest or pleasure in doing things 0 0   Q2: Feeling down, depressed or hopeless 0 0   PHQ-2 Score 0 0     Review of Systems:   Constitutional - no fevers, chills, night sweats, unintentional weight loss/gain   Eyes - no vision concerns   Ears/Nose/Throat - no hearing concerns, no dysphagia/odynophagia   Cardiovascular - palpitations as above, no chest pain   Pulmonary - no shortness of breath, wheezing, coughing   GI - no abdominal pain, constipation, diarrhea, nausea, vomiting    - no dysuria, polyuria, hematuria   Musculoskeletal - no joint or muscle pain  Integument - no rash   Neuro - no weakness, numbness, paresthesia   Heme - no easy bruising/bleeding   Endocrine - no polyuria, weight loss/gain, dry skin, excessive sweating, hair loss   Psychiatric - no feelings of depressed mood or anhedonia in past 2 weeks   Allergic/Immunologic - no history of anaphylaxis, no history of recurrent infections    Past Medical History:   Diagnosis Date     Dyslipidemia      Paroxysmal atrial fibrillation (H)      Past Surgical History:   Procedure Laterality Date     EP ABLATION N/A 2019     HC LAP,INGUINAL HERNIA REPR,INITIAL Bilateral     8 months     HC TOOTH EXTRACTION W/FORCEP       Family History   Problem  "Relation Age of Onset     Colon Cancer Mother 45        approximately     Breast Cancer Mother 58     Atrial fibrillation Father      Urinary tract infection Father         recurrent     Diabetes Type 2  Father      Hyperlipidemia Sister      No Known Problems Brother      Hyperlipidemia Maternal Grandmother      No Known Problems Sister      Prostate Cancer No family hx of      Social History     Tobacco Use     Smoking status: Never Smoker     Smokeless tobacco: Never Used   Substance Use Topics     Alcohol use: Yes     Alcohol/week: 14.0 standard drinks     Types: 14 Glasses of wine per week     Frequency: 4 or more times a week     Drinks per session: 1 or 2     Binge frequency: Never     Comment: Two 4oz glasses of wine a night     Drug use: Not Currently     Social History     Social History Narrative    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      I have reviewed the patient's past medical, surgical, family, and social history.     OBJECTIVE:   /82 (BP Location: Right arm, Patient Position: Sitting, Cuff Size: Adult Regular)   Pulse 58   Temp 96.3  F (35.7  C)   Resp 13   Ht 1.852 m (6' 0.91\")   Wt 102.1 kg (225 lb)   SpO2 97%   BMI 29.76 kg/m       Wt Readings from Last 6 Encounters:   06/11/21 102.1 kg (225 lb)   02/11/20 97.5 kg (215 lb)   02/03/20 98.3 kg (216 lb 12 oz)   01/03/20 100.6 kg (221 lb 11.2 oz)   01/01/20 100.3 kg (221 lb 0.4 oz)   12/26/19 99.3 kg (219 lb)     Constitutional: well-appearing, appears stated age  Eyes: conjunctivae without erythema, sclera anicteric. Pupils equal, round, and reactive to light.   ENT: oropharynx clear, TM grey bilateral  Cardiac: regular rate and rhythm, " normal S1/S2, no murmur/rubs/gallops  Respiratory: lungs clear to auscultation bilaterally, normal work of breathing, no wheezes/crackles  GI: abdomen soft, non-tender, non-distended  Extremities: warm and well perfused, radial pulses 2+ and equal, cap refill brisk.  Lymph: no cervical or supraclavicular lymphadenopathy  Skin: no rashes, lesions, or wounds  Psych: affect is full and appropriate, speech is fluent and non-pressured

## 2021-06-11 ENCOUNTER — OFFICE VISIT (OUTPATIENT)
Dept: FAMILY MEDICINE | Facility: CLINIC | Age: 46
End: 2021-06-11
Payer: COMMERCIAL

## 2021-06-11 VITALS
HEART RATE: 58 BPM | SYSTOLIC BLOOD PRESSURE: 138 MMHG | DIASTOLIC BLOOD PRESSURE: 82 MMHG | BODY MASS INDEX: 29.82 KG/M2 | OXYGEN SATURATION: 97 % | WEIGHT: 225 LBS | HEIGHT: 73 IN | TEMPERATURE: 96.3 F | RESPIRATION RATE: 13 BRPM

## 2021-06-11 DIAGNOSIS — I48.0 PAF (PAROXYSMAL ATRIAL FIBRILLATION) (H): Chronic | ICD-10-CM

## 2021-06-11 DIAGNOSIS — E78.5 DYSLIPIDEMIA: Chronic | ICD-10-CM

## 2021-06-11 DIAGNOSIS — Z00.00 VISIT FOR WELL MAN HEALTH CHECK: Primary | ICD-10-CM

## 2021-06-11 LAB
CHOLEST SERPL-MCNC: 266 MG/DL (ref 0–200)
CHOLEST/HDLC SERPL: 4.9 {RATIO} (ref 0–5)
FASTING SPECIMEN: YES
HBA1C MFR BLD: 5.5 % (ref 4.1–5.7)
HCV AB SERPL QL IA: NONREACTIVE
HDLC SERPL-MCNC: 54 MG/DL
LDLC SERPL CALC-MCNC: 196 MG/DL (ref 0–129)
TRIGL SERPL-MCNC: 77 MG/DL (ref 0–150)
VLDL-CHOLESTEROL: 15 (ref 7–32)

## 2021-06-11 RX ORDER — ASPIRIN 81 MG/1
81 TABLET, CHEWABLE ORAL DAILY
Qty: 90 TABLET | Refills: 3 | Status: SHIPPED | OUTPATIENT
Start: 2021-06-11 | End: 2023-07-03

## 2021-06-11 RX ORDER — ASPIRIN 81 MG/1
81 TABLET, CHEWABLE ORAL DAILY
COMMUNITY
End: 2021-06-11

## 2021-06-11 ASSESSMENT — MIFFLIN-ST. JEOR: SCORE: 1948.08

## 2021-06-11 NOTE — NURSING NOTE
"45 year old  Chief Complaint   Patient presents with     Physical       Blood pressure 134/88, pulse 58, temperature 96.3  F (35.7  C), resp. rate 13, height 1.852 m (6' 0.91\"), weight 102.1 kg (225 lb), SpO2 97 %. Body mass index is 29.76 kg/m .  Patient Active Problem List   Diagnosis     PAF (paroxysmal atrial fibrillation) (H)     Family history of colon cancer in mother     Herniated vertebral disc     Dyslipidemia       Wt Readings from Last 2 Encounters:   06/11/21 102.1 kg (225 lb)   02/11/20 97.5 kg (215 lb)     BP Readings from Last 3 Encounters:   06/11/21 134/88   02/11/20 126/82   02/03/20 121/77         Current Outpatient Medications   Medication     aspirin (ASA) 81 MG chewable tablet     apixaban ANTICOAGULANT (ELIQUIS) 5 MG tablet     MAGNESIUM CITRATE PO     No current facility-administered medications for this visit.        Social History     Tobacco Use     Smoking status: Never Smoker     Smokeless tobacco: Never Used   Substance Use Topics     Alcohol use: Yes     Alcohol/week: 14.0 standard drinks     Types: 14 Glasses of wine per week     Frequency: 4 or more times a week     Drinks per session: 1 or 2     Binge frequency: Never     Comment: Two 4oz glasses of wine a night     Drug use: Not Currently       Health Maintenance Due   Topic Date Due     PREVENTIVE CARE VISIT  Never done     ADVANCE CARE PLANNING  Never done     HEPATITIS C SCREENING  Never done     PHQ-2  01/01/2021       No results found for: PAP      June 11, 2021 8:07 AM  "

## 2021-06-17 NOTE — PATIENT INSTRUCTIONS - HE
Patient Instructions by Jerome Ornelas MD at 4/23/2019  9:20 AM     Author: Jerome Ornelas MD Service: -- Author Type: Physician    Filed: 4/23/2019  1:14 PM Encounter Date: 4/23/2019 Status: Signed    : Jerome Ornelas MD (Physician)       Patient Education     Prevention Guidelines, Men Ages 40 to 49  Screening tests and vaccines are an important part of managing your health. A screening test is done to find possible disorders or diseases in people who don't have any symptoms. The goal is to find a disease early so lifestyle changes can be made and you can be watched more closely to reduce the risk of disease, or to detect it early enough to treat it most effectively. Screening tests are not considered diagnostic, but are used to determine if more testing is needed. Health counseling is essential, too. Below are guidelines for these, for men ages 40 to 49. Talk with your healthcare provider to make sure youre up to date on what you need.  Screening Who needs it How often   Alcohol misuse All men in this age group At routine exams   Blood pressure All men in this age group Yearly checkup if your blood pressure reading is normal  Normal blood pressure is less than 120/80 mm Hg  If your blood pressure is higher than normal, follow the advice of your healthcare provider      Depression All men in this age group At routine exams   Type 2 diabetes or prediabetes All men beginning at age 45 and men  without symptoms at any age who are overweight or obese and have 1 or more other risk factors for diabetes At least every 3 years (yearly if blood sugar has begun to rise)   Type 2 diabetes All men with prediabetes Every year   Hepatitis C Men at increased risk for infection - talk with your healthcare provider At routine exams   High cholesterol or triglycerides All men ages 35 and older, and younger men at high risk for coronary artery disease At least every 5 years   HIV All men At  routine exams   Obesity All men in this age group At routine exams   Prostate cancer Starting at age 45, talk to healthcare provider about risks and benefits of digital rectal exam (MAIKEL) and prostate-specific antigen (PSA) screening1 At routine exams   Syphilis Men at increased risk for infection - talk with your healthcare provider At routine exams   Tuberculosis Men at increased risk for infection - talk with your healthcare provider Check with your healthcare provider   Vision All men in this age group Every 2 to 4 years if no risk factors for eye disease2   Vaccine Who needs it How often   Chickenpox (varicella) All men in this age group who have no record of this infection or vaccine 2 doses; the second dose should be given at least 4 weeks after the first dose   Hepatitis A Men at increased risk for infection - talk with your healthcare provider 2 doses given at least 6 months apart   Hepatitis B Men at increased risk for infection - talk with your healthcare provider 3 doses over 6 months; second dose should be given 1 month after the first dose; the third dose should be given at least 2 months after the second dose and at least 4 months after the first dose   Haemophilus influenzae Type B (HIB) Men at increased risk for infection - talk with your healthcare provider 1 to 3 doses   Influenza (flu) All men in this age group Once a year   Measles, mumps, rubella (MMR) All men in this age group who have no record of these infections or vaccines 1 or 2 doses   Meningococcal Men at increased risk for infection - talk with your healthcare provider 1 or more doses   Pneumococcal conjugate vaccine (PCV13) and pneumococcal polysaccharide vaccine (PPSV23) Men at increased risk for infection - talk with your healthcare provider PCV13: 1 dose ages 19 to 65 (protects against 13 types of pneumococcal bacteria)     PPSV23: 1 to 2 doses through age 64, or 1 dose at 65 or older (protects against 23 types of pneumococcal  bacteria)      Tetanus/diphtheria/  pertussis (Td/Tdap) booster All men in this age group Td every 10 years, or a one-time dose of Tdap instead of a Td booster after age 18, then Td every 10 years   Counseling Who needs it How often   Diet and exercise Men who are overweight or obese When diagnosed, and then at routine exams   Sexually transmitted infection prevention Men at increased risk for infection - talk with your healthcare provider At routine exams   Use of daily aspirin Men ages 45 to 79 at risk for cardiovascular health problems At routine exams   Use of tobacco and the health effects it can cause All men in this age group Every exam   [address] [place] [address] [facility]   [facility] [employer]  Date Last Reviewed: 2/1/2017 2000-2017 [employer]. [address] [place] [address], [place] [address]. All rights reserved. This information is not intended as a substitute for professional medical care. Always follow your healthcare professional's instructions.

## 2021-06-19 NOTE — LETTER
Letter by Andria Montenegro RN at      Author: Andria Montenegro RN Service: -- Author Type: --    Filed:  Encounter Date: 11/11/2019 Status: Signed        Dear Juancarlos Causey,    Important Information for Your Procedure  Your Procedure:    Cardioversion    Getting Ready for Your Procedure:    You will need to bring a current list of your medications with you for our admissions process the day of your procedure, please do not bring any of your own medications  with you to the hospital    The hospital cannot store your valuables, so please leave them at home    Please make arrange for transportation home, as you will not be able to drive following your procedure  The Night Prior to Your Procedure:    Please do not have anything to eat or drink after Midnight (12:00am) prior to your procedure    It is important to stay well hydrated, and consume balanced meals the day prior to your procedure  Arriving for Your Procedure:    Please arrive at [facility], located at: [address] [place] [address]      parking is available after 5:00am for your convenience, parking is also available in the parking ramp located off of [facility] [address] street attached to the hospital    If you park in the ramp located on [address] [place] [address], take the elevator to level one. Enter the doors to the atrium/lobby area and check in at the main reception desk.     Please check in at the main reception desk in the lobby    You will be assisted to Cardiac [facility] on the third floor.  Going Home:    You will go home after the procedure.    The physician and/or nurse will review any restrictions, follow-up requirements, and medication instructions prior to going home from the hospital  Clinic Contact Information:    You can contact our main clinic line at any time with questions, concerns, or if you need further information: [facility] [phone]    If you have further questions in regards to the scheduling of  you procedure, please contact our scheduling team at [phone]    Medication prior to procedure:    Please take your morning medications the day of your procedure with sips of water, except any multivitamins or supplements.    Continue taking Eliquis as ordered, we are not going to stop your anticoagulation prior to your procedure.    Sincerely,    Andria Montenegro RN  [phone]

## 2021-06-19 NOTE — LETTER
Letter by Steph Escobar RN at      Author: Steph Escobar RN Service: -- Author Type: --    Filed:  Encounter Date: 12/9/2019 Status: Signed         Juancarlos Causey  [address] [place] [address]             December 9, 2019         Dear Mr. Causey,    Below are the results from your recent visit:    Resulted Orders   NM Exercise Stress Test   Result Value Ref Range    Pharmacologic Protocol  Thierry     Test Type Treadmill     Baseline HR 63     Baseline Systolic      Baseline Diastolic BP 81     Last Stress      Last Stress Systolic      Last Stress Diastolic      Target      PERCENT HR 85%     Exercise duration (min) 13     Exercise duration (sec) 3     Estimated workload 13.5     Exercise Stage Reached  Stage 5     ST Deviation Elevation II 1.7mm     Deviation Time V5 -1.7mm     ST Elevation Amount V3 2.8mm     ST Deviation Amount he aVR -1.5mm     Final Resting /69     Final Resting HR 75     Max Treadmill Speed 5.0     Max Treadmill Grade 18.0     Peak Systolic /81     Peak Diastolic /100     Max      Stress Phase Resting     Stress Resting Pt Position STANDING     Current HR 61     Current /81     Stress Phase Resting     Stress Resting Pt Position MANUAL EVENT     Current      Current /100     Stress Phase Stress     Stage Minute EXE 00:00     Exercise Stage STAGE 1     Current HR 76     Current /74     Stress Phase Stress     Stage Minute EXE 01:00     Exercise Stage STAGE 1     Current HR 82     Current /74     Stress Phase Stress     Stage Minute EXE 02:00     Exercise Stage STAGE 1     Current HR 85     Current /74     Stress Phase Stress     Stage Minute EXE 02:38     Exercise Stage STAGE 1     Current HR 81     Current /80     Stress Phase Stress     Stage Minute EXE 03:00     Exercise Stage STAGE 2     Current HR 84     Current /80     Stress Phase Stress     Stage Minute  EXE 04:00     Exercise Stage STAGE 2     Current HR 94     Current /80     Stress Phase Stress     Stage Minute EXE 05:00     Exercise Stage STAGE 2     Current HR 97     Current /80     Stress Phase Stress     Stage Minute EXE 05:27     Exercise Stage STAGE 2     Current HR 92     Current /80     Stress Phase Stress     Stage Minute EXE 06:00     Exercise Stage STAGE 3     Current HR 95     Current /80     Stress Phase Stress     Stage Minute EXE 07:00     Exercise Stage STAGE 3     Current      Current /80     Stress Phase Stress     Stage Minute EXE 08:00     Exercise Stage STAGE 3     Current      Current /80     Stress Phase Stress     Stage Minute EXE 08:25     Exercise Stage STAGE 3     Current      Current /80     Stress Phase Stress     Stage Minute EXE 09:00     Exercise Stage STAGE 4     Current      Current /80     Stress Phase Stress     Stage Minute EXE 10:00     Exercise Stage STAGE 4     Current      Current /80     Stress Phase Stress     Stage Minute EXE 10:59     Exercise Stage STAGE 4     Current      Current /100     Stress Phase Stress     Stage Minute EXE 11:00     Exercise Stage STAGE 4     Current      Current /100     Stress Phase Stress     Stage Minute EXE 12:00     Exercise Stage STAGE 5     Current      Current /100     Stress Phase Stress     Stage Minute EXE 13:00     Exercise Stage STAGE 5     Current      Current /100     Stress Phase Stress     Stage Minute EXE 13:01     Exercise Stage STAGE 5     Current      Current /100     Stress Phase Stress     Stage Minute EXE 13:03     Exercise Stage STAGE 5     Current      Current /100     Stress Phase Recovery     Stage Minute REC 00:10     Exercise Stage Recovery     Current      Current /100     Stress Phase Recovery     Stage Minute REC 00:56     Exercise Stage Recovery  "    Current      Current /100     Stress Phase Recovery     Stage Minute REC 01:00     Exercise Stage Recovery     Current      Current /81     Stress Phase Recovery     Stage Minute REC 01:56     Exercise Stage Recovery     Current HR 92     Current /81     Stress Phase Recovery     Stage Minute REC 01:58     Exercise Stage Recovery     Current HR 95     Current /72     Stress Phase Recovery     Stage Minute REC 02:56     Exercise Stage Recovery     Current HR 89     Current /72     Stress Phase Recovery     Stage Minute REC 03:56     Exercise Stage Recovery     Current HR 81     Current /72     Stress Phase Recovery     Stage Minute REC 03:59     Exercise Stage Recovery     Current HR 80     Current /75     Stress Phase Recovery     Stage Minute REC 04:56     Exercise Stage Recovery     Current HR 75     Current /75     Stress Phase Recovery     Stage Minute REC 05:17     Exercise Stage Recovery     Current HR 77     Current /69     Stress Phase Recovery     Stage Minute REC 05:56     Exercise Stage Recovery     Current HR 73     Current /69     Stress Phase Recovery     Stage Minute REC 06:17     Exercise Stage Recovery     Current HR 75     Current /69     Calculated Percent HR 86 %    Rate Pressure Product 27,000.0     Left Ventricular EF 52 %    Narrative    ?  The nuclear stress test is negative for inducible myocardial ischemia   or infarction.  ?  The left ventricular ejection fraction at stress is 52%.  ?  There is no prior study for comparison.       Dr. Juarez has reviewed your Stress Test results and states:  \"Notes recorded by Neymar Juarez MD on 11/14/2019 at 5:26 PM CST  Great news!  Normal stress nuclear\"    Please call with questions or contact us using [employer].    Sincerely,        Steph RODRIGES RN for Dr. Neymar Juarez       "

## 2021-06-19 NOTE — LETTER
Letter by Kiarra Burkett RN at      Author: Kiarra Burkett RN Service: -- Author Type: --    Filed:  Encounter Date: 12/5/2019 Status: Signed       Juancarlos Causey  [address]  [place] [address]        IMPORTANT INFORMATION REGARDING YOUR      PULMONARY VEIN ISOLATION ABLATION     Pre procedure Cardiac CT/MRI : This is scheduled for 12/16/19 at 7am      Used to obtain images of your Pulmonary Veins prior to the procedure to assist in mapping your heart during the ablation.    Preoperative Physical : Thursday 12/26/19 at 2:15pm      Your pre operative physical is scheduled with our EP Nurse Practitioner  Brittanie Marlow .     Please bring these instructions with you to your appointment.    You will meet with a Nurse for education after your visit with the EP Nurse Practitioner    Pulmonary Vein Isolation Ablation: Tuesday 12/31/19 with Dr Hoover      Please arrive at 5:30 am for registration and prep.    Your procedure is scheduled for 8:00 am. Please keep in mind this time is not exact, and this time may vary based on unforseen circumstances.     Preparing at Home for your Pulmonary Vein Isolation Ablation:    Have nothing to eat or drink after midnight the night prior to your ablation.    Please DO NOT take any of your medication the morning of your procedure EXCEPT your Eliquis the morning of your ablation.    You will need to start Pepcid 20mg twice daily 3 days prior to your ablation, and continue this for 3 weeks after your ablation. This medication will be sent to your pharmacy at your the time of your office visit in the clinic. It is important to take this medication to help reduce the discomfort caused through the process of your ablation, that potentially can cause irritation to the esophagus.    You will have general anesthesia for the procedure and will need to lay flat for 4-6 hours after the procedure.    You will need to bring a current list of your medications with you for our admissions  process the day of your procedure, please do not bring any of your own medications with you to the hospital.    The hospital cannot store your valuables, so please leave them at home    You will need to take off your jewelry prior to your procedure, we advise leaving your jewelry at home, as we cannot store your valuables    We ask that you please shower the morning of your procedure, or the night before.    You will have a fermin catheter placed in your bladder prior to the procedure.  Please let us know if you have had difficulty with a fermin catheter in the past.    If you use a CPAP machine for breathing while you are sleeping, please bring this with you to the hospital.    You will stay over night for an Observation stay.    Anticoagulation:    It is important to remain on your anticoagulation medication (Eliquis) uninterrupted before and after your ablation, PLEASE DO NOT STOP THIS MEDICATION.    You may not be able to have this procedure if you skip a dose of your Eliquis within 30 days of your procedure. We encourage you to make sure you are taking this medication without skipping any doses, if you happen to miss a dose prior to your procedure please contact us to make further arrangements and If you have any questions regarding your medication prior to your procedure please contact me at the number listed below.    Postoperative Information :      This procedure requires you to spend the night in the hospital overnight for observation. The hospital staff have asked that you arrange for your family/health  to be present at the hospital by 9:00 AM the following day to review your discharge instructions and transport you home from the hospital. Their goal is to have you discharged from the hospital by around 10:00AM.    Detailed information regarding discharge instructions will be given to you when you leave the hospital.    Please plan on taking 5-7 days after the procedure for recuperation.     We ask  that you do not drive until you are seen in the clinic for your post op follow up.  This is to aide in the healing of your groin sites.  This is scheduled on Friday 1/3/20 at 1:30pm.    Follow up:  After the ablation you will be scheduled to see:    EP Nurse Clinician for an assessment and suture removal, if appropriate.    EP Nurse Practitioner in 4-6 weeks;  A 2 week heart monitor will be ordered for you to wear about 8 weeks after the ablation.    Dr Hoover or the Electrophysiology Nurse Practitioner 3 months after the ablation.    If you have any questions regarding scheduling please call the scheduling line at [phone]    Travel Restrictions following procedure :      No traveling by plane for 4 weeks.    Please refrain from extended travel outside the [place] for 3 weeks.    Contact the clinic for questions.    Parking information :      Please arrive at [facility], located at: [address] [place] [address]      Parking is available at the [address] [place] [address] entrance, and is open at 5:00 am.    If you park in the ramp located on [address], take the elevator to Lobby/level one.     Enter the doors to the atrium/lobby area and check in at the main reception desk.    You will be assisted to Cardiac Special Care on the third floor.     Ramp parking will be free the day of your procedure and reduced to $4.00 for each visit after.      Please ask at the main reception desk in the lobby or on the third floor for parking validation.    Contact Information :      Please call with any questions or concerns regarding the procedure:Kiarra Burkett RN [phone]      Scheduling questions or concerns: Cardiovascular Procedural Schedulers at [phone]    For urgent needs after clinic hours or on weekends please contact the physician on call at [phone]                        Thank you for choosing [facility] [place].        Information on Atrial Fibrillation Ablations    What is  Catheter Ablation?             A Catheter Ablation is a procedure that treats certain types of abnormal heart rhythms (arrhythmia). There are several components to the procedure, but the final purpose is target and destroy (ablate) small areas of your heart muscle that are causing the arrhythmia.     Why is an Ablations Done?  A catheter ablation is an effective way to treat some types of abnormal heart rhythms. An ablation is a relatively low risk procedure that may permanently cure your abnormal heart rhythm.  The ablation process damages the heart cells which results in scarring of that area. The scar is electrically inactive and can produce a permanent cure for the abnormal rhythm.  Ablation procedures can help avoid the need for rhythm medications and give patients the ability to return to their normal activity and live an active life. In patients that do not have symptoms, ablations are not typically done as there may still be an increased risk of stroke.    Why is Catheter Ablation Done?  Sometimes, the hearts electrical system does not work properly.  This can cause abnormal heart rhythms, called arrhythmias.  During an arrhythmia, the heart may beat too fast, too slowly, or irregularly.  Your doctor has recommended catheter ablation to treat a rapid (fast) heart rhythm, or tachycardia.      How Catheter Ablation Is Done  Catheter ablation uses thin, flexible wires called electrode catheters to find and destroy (ablate) problem cells. Here is how the procedure is done:    The pulmonary veins will be treated first. There are currently two tools used to ablate around the pulmonary veins.  1) Radiofrequency catheter will heat the tissue.  2)  Cryo-balloon catheter will freeze the tissue.       Testing will be done to confirm that effective treatment has been delivered.       Further testing may be performed to see if a fib is still present or if some other rhythm problem such as atrial flutter is present. If an  ongoing rhythm problem is discovered then further ablation can be done to isolate and destroy those areas responsible for the arrhythmia.       Your Experience during Catheter Ablation  In most cases, catheter ablations are done in an electrophysiology (EP) lab. Depending on your arrhythmia it often takes 4-6 hours, and sometimes longer.     The procedural area: You will be transferred back to the procedure room once you have been appropriately prepped by the nursing staff_ and you are ready for your ablation.     Sedation: You to be put completely asleep for your ablation using general anesthesia.    While you are asleep a bladder catheter will be inserted. This will be removed after your bedrest is complete.    Inserting the catheters: You will have 3-4 catheters inserted into the veins. Catheter locations can include the shoulder, neck, and groins. Catheters are guided to the heart with the help of x-ray monitors.    Finishing up: When the procedure is finished, the catheters are taken out of your body. A special closure device may be used to help seal these puncture sites. Youre then taken to your room to rest, and will be cared for by an intensive care unit (ICU) nurse.      Risks and Complications  The risks of catheter ablation are fairly low compared to the benefits you receive. Possible risks and complications include:    Common (up to 10%)  o Bleeding or bruising  o Shortness of breath  o Heartburn    Uncommon (< 1%)  o Blood clots  o A slow heart rhythm (requiring a permanent pacemaker)  o Perforation of the heart muscle, blood vessel, or lung (may require an emergency procedure)  o Stroke  o Damage to a heart valve   o Heart attack, also known as acute myocardial infarction, or AMI   o Death (extremely rare)    Before your Catheter Ablation  Before your catheter ablation, you will meet with the electrophysiologist (specially trained heart doctor) who will do the procedure. The provider or a registered  nurse will provide you with detailed instructions on how to prepare for this procedure, some of these instructions are listed below.    You will likely be told to stop or change your heart rhythm medications for a period of time before your ablation.     You may have testing done several days prior to your ablation or the morning of your ablation, such as an ECG, x-ray, blood tests, or echocardiogram.     You will not be allowed to eat or drink 8 hours before your ablation. You will be given further instructions by your physician or a registered nurse regarding the medication you will take the morning of your procedure.    You will need to arrange to have a  home from the hospital; you will not be permitted to drive after your procedure due to the sedation that you receive.     You are allowed to bring personal items and clothing to the hospital, but please refrain from bringing any valuables as the hospital is not responsible for any lost or stolen items.    You will need to bring a list of the names and dosages of the medication you are taking to the hospital.    It is important to mention to your doctor or registered nurse if you have any allergies, reactions to anesthesia, or have had history of bleeding problems.     Arriving at the Hospital the morning of your Catheter Ablation  You will need to check in at the 1st floor entrance at the [facility] the morning of your ablation, you will then be escorted to the 3rd floor where they will prepare you for your ablation and where your ablation will be performed.    When you arrive on the floor the doctor or registered nurse will meet with you prior to your ablation, this is a good time to ask questions and address any concerns you may have. You will then be asked to sign the consent form for your ablation, if this has not already been done.    The nursing staff will begin to prepare you for your procedure:    A nurse will shave and  cleanse the area where the ablation catheters will be placed. These areas are most commonly the left and right groin sites (the fold between your thigh and abdomen), and in some cases the chest, arm, and neck. This is done to reduce the risk of infection.      The nursing staff will start an intravenous (IV) line into a vein in your arm, which allows the staff to give you medication and sedatives to help you relax prior and during your ablation.    In some cases, the nursing staff will need to place a catheter that will drain urine from your bladder (Esparza Catheter), which is required due to the length and complexity of the ablation you are having.    After Catheter Ablation  Recovery immediately after your ablation in the hospital  After your catheter ablation procedure, you will be taken to a recovery room. You may need to lie flat for 2-6 hours while the insertion sites close up. During this time, a nurse will monitor you, and you will be given medication to make you comfortable. This ablation requires you to stay in the hospital overnight.    Going Home  When it is time to go home, your will need to have an adult family member or friend drive you. Most people can walk, climb stairs, and perform light activity soon after catheter ablation. You can most likely return to your full routine within a few days. However, you may be told to avoid running, heavy lifting, and other strenuous activities for a short time. Please make sure to follow any specific activity restrictions provided by the medical staff at the time of your discharge from the hospital.    Doctor's typically advise that you not drive until your post procedure assessment visit in the clinic.     Avoid heavy physical activity and heavy lifting for several days after the procedure to allow your body to heal.    Ask your doctor when you can expect to return to work.    Take your temperature and check your incision for signs of infection (redness,  swelling, drainage, or warmth) every day for a week. It is normal to have a small bruise or lump where the catheter was inserted.    Take your medications exactly as directed. Do not skip doses or stop medication without consulting your physician prior.    Learn to take your own pulse and keep a record of your results.    Follow-Up  After your ablations you will have a follow up visit to see how you are doing, to assess your rhythm after your ablation, and to address any medication changes if necessary. In many cases, one ablation is enough to treat an arrhythmia. However, sometimes the problem returns or another is found. If this happens, you may need a second catheter ablation. Tell your healthcare provider if you have any new or returning symptoms.    Common Symptoms after Catheter Ablation  In the first few weeks after catheter ablation, you may feel mild chest fullness or aching. You may also feel as if your heart is skipping beats or your heartbeat may feel faster than normal. You may think that your heart rhythm problem is about to return. These sensations are normal and usually go away with time. Talk to your healthcare provider if you are concerned.    When to Call Your Doctor    Increased bleeding, bruising, or pain at the insertion site    Episodes of atrial fibrillation are common post procedure, call the clinic if episodes are lasting longer than 4-6 hours    Difficulty with your speech or walking, or any visual disturbance    Lightheaded, dizziness, or feeling faint    Shortness of breath or chest pain    Coldness, swelling, or numbness of the arm or leg near the insertion site    A bruise or lump at the insertion site that is larger than a walnut    A fever over 100 F

## 2021-06-21 NOTE — LETTER
Letter by Brittanie Marlow CNP at      Author: Brittanie Marlow CNP Service: -- Author Type: --    Filed:  Encounter Date: 4/25/2021 Status: (Other)         Juancarlos Causey  [address] [place] [address]      April 25, 2021      Dear Juancarlos,    This letter is to remind you that you are due for your follow up appointment with Mary Nunes NP  . To help ensure you are in the best health possible, a regular follow-up with your cardiologist is essential.     Please call our Patient Scheduling Line at [phone] to schedule your appointment at your earliest convenience.  If you have recently scheduled an appointment, please disregard this letter.    We look forward to seeing you again. As always, we are available at the number  above for any questions or concerns you may have.      Sincerely,     The Physicians and Staff of [facility]

## 2021-06-21 NOTE — LETTER
Letter by Kal Hoover MD at      Author: Kal Hoover MD Service: -- Author Type: --    Filed:  Encounter Date: 4/29/2021 Status: (Other)         Juancarlos Causey  [address] [place] [address]      April 29, 2021      Dear Juancarlos,    This letter is to remind you that you are due for your follow up appointment with Dr. Kal Hoover  . To help ensure you are in the best health possible, a regular follow-up with your cardiologist is essential.     Please call our Patient Scheduling Line at [phone] to schedule your appointment at your earliest convenience.  If you have recently scheduled an appointment, please disregard this letter.    We look forward to seeing you again. As always, we are available at the number  above for any questions or concerns you may have.      Sincerely,     The Physicians and Staff of [facility]

## 2021-06-28 NOTE — PROGRESS NOTES
Progress Notes by Neymar Juarez MD at 11/11/2019  1:50 PM     Author: Neymar Juarez MD Service: -- Author Type: Physician    Filed: 11/11/2019  2:54 PM Encounter Date: 11/11/2019 Status: Signed    : Neymar Juarez MD (Physician)         Thank you, Dr. Ornelas, for asking the [facility] team to see Mr. Juancarlos Causey to evaluate       Assessment/Recommendations   Assessment/Plan:  1. afib - noted possible atypical flutter - started 11/6, started on eliquis 11/10, no DM, HTN, DM, heart failure or CVA/Embolus.  Symptoms prevent waiting 3 weeks, will go ahead iwht FREDDY/CV if no thrombus in DENICE, he will stop all EtOH, if afib/flutter returns would advocate ablation given resting HR in 40-50 and he exercises on regular basis.  After CV eliquis for 4 weeks then stop and start aspirin 162mg daily.  2. Chest pressure- schdule stress imaging given symptoms and hyperlipidemia  3. Hyperlipidemia - start rosuvastatin 20mg     folllow up in 6 mo       History of Present Illness/Subjective    Mr. Juancarlos Causey is a 44 y.o. male with afib started last Wed.  Father with afib, pt does not have a hx of DM, HTN, thyroid, EtOH rare but sometimes upto 4-5 drinks, started exercises last week and watching his diet, no hx of SYDNEE, BMI 29, snores slightly, started eqliusi last Saturday.  He had one episode of afib 10 years ago.   He has episodes of feeling chest pressure al the time, and dizziness on standing up.  Resting HR 40-50 now in 80's.  No URI last couple weeks.  Echo at [facility] in 2011 with prevous episode normal. Trop < 0.019, BMP, hemogram 11/10/19  Trip to [place] in early Oct but very active, but no dyspnea until last week since Saturday.  Last lipids 4/19  and HDL 57, trig 81.  1/2017 CRP 0.4.          Physical Examination Review of Systems   Vitals:    11/11/19 1353   BP: 124/70   Pulse: 80     Body mass index is 29.12 kg/m .  Wt Readings from Last 3  Encounters:   11/11/19 220 lb 11.2 oz (100.1 kg)   04/23/19 220 lb 4 oz (99.9 kg)   01/20/17 (!) 224 lb (101.6 kg)     [unfilled]  General Appearance:   no distress, normal body habitus   ENT/Mouth: membranes moist, no oral lesions or bleeding gums.      EYES:  no scleral icterus, normal conjunctivae   Neck: no carotid bruits or thyromegaly   Chest/Lungs:   lungs are clear to auscultation, no rales or wheezing,  sternal scar, equal chest wall expansion    Cardiovascular:   Regular. Irregular rhtthm Normal first and second heart sounds with no murmurs, rubs, or gallops; the carotid, radial and posterior tibial pulses are intact, Jugular venous pressure , edema bilaterally    Abdomen:  no organomegaly, masses, bruits, or tenderness; bowel sounds are present   Extremities: no cyanosis or clubbing   Skin: no xanthelasma, warm.    Neurologic: normal  bilateral, no tremors     Psychiatric: alert and oriented x3, calm     Review of Systems - 12 points nega other than above      Medical History  Surgical History Family History Social History   No past medical history on file. Past Surgical History:   Procedure Laterality Date   ? HERNIA REPAIR Bilateral     Family History   Problem Relation Age of Onset   ? Breast cancer Mother    ? Colon cancer Mother    ? Diabetes Father    ? Atrial fibrillation Father    ? Arthritis Maternal Grandmother     Social History     Socioeconomic History   ? Marital status:      Spouse name: Not on file   ? Number of children: Not on file   ? Years of education: Not on file   ? Highest education level: Not on file   Occupational History   ? Not on file   Social Needs   ? Financial resource strain: Not on file   ? Food insecurity:     Worry: Not on file     Inability: Not on file   ? Transportation needs:     Medical: Not on file     Non-medical: Not on file   Tobacco Use   ? Smoking status: Never Smoker   ? Smokeless tobacco: Never Used   Substance and Sexual Activity   ? Alcohol use:  Yes     Frequency: 4 or more times a week   ? Drug use: Never   ? Sexual activity: Yes   Lifestyle   ? Physical activity:     Days per week: Not on file     Minutes per session: Not on file   ? Stress: Not on file   Relationships   ? Social connections:     Talks on phone: Not on file     Gets together: Not on file     Attends Yazidi service: Not on file     Active member of club or organization: Not on file     Attends meetings of clubs or organizations: Not on file     Relationship status: Not on file   ? Intimate partner violence:     Fear of current or ex partner: Not on file     Emotionally abused: Not on file     Physically abused: Not on file     Forced sexual activity: Not on file   Other Topics Concern   ? Not on file   Social History Narrative   ? Not on file          Medications  Allergies   Scheduled Meds:  Continuous Infusions:  PRN Meds:. No Known Allergies      Lab Results    Chemistry/lipid CBC Cardiac Enzymes/BNP/TSH/INR   Lab Results   Component Value Date    CHOL 251 (H) 04/23/2019    HDL 57 04/23/2019    LDLCALC 178 (H) 04/23/2019    TRIG 81 04/23/2019    CREATININE 0.78 04/23/2019    BUN 11 04/23/2019    K 4.7 04/23/2019     04/23/2019     04/23/2019    CO2 30 04/23/2019    Lab Results   Component Value Date    WBC 4.4 01/20/2017    HGB 16.1 01/20/2017    HCT 46.7 01/20/2017    MCV 93 01/20/2017     01/20/2017    Lab Results   Component Value Date    TSH 1.42 04/23/2019              Neyamr Juarez MD  Interventional Cardiology  [facility]

## 2021-06-28 NOTE — PROGRESS NOTES
Progress Notes by Brittanie Marlow CNP at 12/26/2019  8:30 AM     Author: Brittanie Marlow CNP Service: -- Author Type: Nurse Practitioner    Filed: 12/26/2019  9:18 AM Encounter Date: 12/26/2019 Status: Signed    : Brittanie Marlow CNP (Nurse Practitioner)           Assessment/Plan:      Problem List Items Addressed This Visit     None          1.  Atrial fibrillation: Initially diagnosed 11/10/19.  Symptoms consist of palpitations, heart pounding, and lightheadedness.  Unable to tolerate antiarrhythmic therapy due to significant bradycardia in normal rhythm.  he has a HAB3EW2-PHEd score of 0.  He is on Eliquis for stroke prophylaxis.  he reports no missed doses or bleeding issues.   he is scheduled for  pulmonary vein isolation ablation with Dr. Hoover on 12/31/19. his questions were answered to his satisfaction and he is ready to proceed.    2.  Hyperlipidemia: Being followed by Dr. Juarez.  Currently patient is not taking Crestor        Subjective:      Juancarlos Causey is a 44 y.o. male who comes in today for history and physical prior to pulmonary vein isolation ablation.    Juancarlos Causey has a past history of hyperlipidemia for which he is on Crestor therapy, followed by Dr. Juarez.    Patient's afib history is as follows. He noted a fluttering in his chest on 11/6/2019, started Eliquis on 11/10/2019, came to see Dr. Juarez on 11/11/2019, confirmation with EKG that patient was in atrial fibrillation, and scheduled for FREDDY and cardioversion.  Patient underwent successful cardioversion on 11/12/2019.  His baseline heart rates are bradycardic.  He does exercise on a regular basis.    He continued to note intermittent elevated heart rates, which is associated with palpitations and lightheadedness.  He tells me today that he feels he has been in A. fib 75% of the time.    Juancarlos Causey denies chest discomfort.  He has palpitations, lightheadedness, and  "pre-syncope.    Medical, surgical, family, social history, and medications were reviewed and updated as necessary.    Patient Active Problem List   Diagnosis   ? Family history of colon cancer in mother   ? PAF (paroxysmal atrial fibrillation) (H)   ? Lightheadedness       Past Medical History:   Diagnosis Date   ? Atrial fibrillation (H)    ? Hyperlipidemia        Past Surgical History:   Procedure Laterality Date   ? CARDIOVERSION  11/12/2019   ? HERNIA REPAIR Bilateral        No Known Allergies    Current Outpatient Medications   Medication Sig Dispense Refill   ? ELIQUIS 5 mg Tab tablet Take 1 tablet (5 mg total) by mouth 2 (two) times a day. 60 tablet 2   ? rosuvastatin (CRESTOR) 20 MG tablet Take 1 tablet (20 mg total) by mouth at bedtime. 90 tablet 11   ? sotalol (BETAPACE) 80 MG tablet Take 0.5 tablets (40 mg total) by mouth 2 (two) times a day. 30 tablet 3     No current facility-administered medications for this visit.        Family History   Problem Relation Age of Onset   ? Breast cancer Mother    ? Colon cancer Mother    ? Diabetes Father    ? Atrial fibrillation Father    ? Arthritis Maternal Grandmother        Social History     Tobacco Use   ? Smoking status: Never Smoker   ? Smokeless tobacco: Never Used   Substance Use Topics   ? Alcohol use: Yes     Frequency: 4 or more times a week   ? Drug use: Never       Review of Systems:   General: WNL  Eyes: WNL  Ears/Nose/Throat: WNL  Lungs: WNL  Heart: Shortness of Breath with activity, Irregular Heartbeat, Fainting  Stomach: WNL  Bladder: WNL  Muscle/Joints: WNL  Skin: WNL  Nervous System: WNL  Mental Health: WNL   He does report that he is anxious about the upcoming ablation  Blood: WNL      Objective:    /80 (Patient Site: Left Arm, Patient Position: Sitting, Cuff Size: Adult Regular)   Pulse 92   Resp 14   Ht 6' 1\" (1.854 m)   Wt 219 lb (99.3 kg)   BMI 28.89 kg/m      Physical Exam  General Appearance:  Alert, well-appearing, in no acute " distress.     HEENT:  Atraumatic, normocephalic; no scleral icterus, EOM intact, PERRL; the mucous membranes were pink and moist; no cervical bruits or obvious thyromegaly.   Chest: Chest symmetric, spine straight.     Lungs:   Respirations unlabored; the lungs are clear to auscultation.   Cardiovascular:   Auscultation reveals murmurs.  Irregularly irregular rhythm, normal heart rate.  No JVD.  Radial and posterior tibial pulses are intact.    Abdomen:  Soft, nontender, nondistended, bowel sounds present.     Extremities: No cyanosis, clubbing, or edema.   Musculoskeletal:   moves all extremities.     Skin: No xanthelasma.   Neurologic: Mood and affect are appropriate. Oriented to person, place, time, and situation.       Cardiographics  EC2019 shows coarse Afib at 93 bpm       MR pulmonary veins 19: Done   1. There are 4 pulmonary veins with normal configuration with early small branch of right upper pulmonary vein. Please see above measurements.  2. Esophagus is adjacent to the posterior wall of left atrial body.  3. Left atrium is moderately enlarged.  4. Normal left ventricular size, wall thickness and mild global hypokinesis. The quantified left ventricular ejection fraction is 47%. No myocardial scar is identified.   5. Normal right ventricular size function.   6. No obvious valvular disease.      Lab Review   No results found for this or any previous visit.  Lab Results   Component Value Date    WBC 4.4 2017    HGB 16.1 2017    HCT 46.7 2017    MCV 93 2017     2017           [phone]    This note has been dictated using voice recognition software. Any grammatical, typographical, or context distortions are unintentional and inherent to the software.

## 2021-06-28 NOTE — PROGRESS NOTES
Progress Notes by Brittanie Marlow CNP at 12/2/2019  1:30 PM     Author: Brittanie Marlow CNP Service: -- Author Type: Nurse Practitioner    Filed: 12/2/2019  2:17 PM Encounter Date: 12/2/2019 Status: Signed    : Brittanie Marlow CNP (Nurse Practitioner)         Thank you, Dr. Ornelas, for asking the [facility] team to see Mr. Juancarlos Causey.      Assessment/Recommendations   Assessment:    Diagnoses and all orders for this visit:    PAF (paroxysmal atrial fibrillation) (H)  Patient maintained normal rhythm for approximately 2 weeks, and then started to notice similar symptoms to his initial diagnosis.  His heart rhythm is irregular today.  At the time of cardioversion, patient and myself had prior discussed the possibility of needing ablation.  He would like to proceed.  His current [id number] score is 0.  He was started on Eliquis 11/10/2019, has not missed any doses since that time.  Advised him to continue, as long as he has no missed doses will not require pre-procedure FREDDY.  Will forward note to Dr. Hoover, however the patient is a good candidate for ablation.  His father did have ablation, may be a genetic component.  Patient and myself discussed risks and benefit of ablation.  Discussed possible complications including groin hematoma, esophageal irritation, and stroke.    I will check timing of possible ablation with nursing staff.  If he is able to go for PVI in the near future, would suggest to [facility].  However, if the ablation schedule is out a few weeks, he will need to start on 40 mg of sotalol twice daily, get an EKG for 5 doses.  He does have a risk for increased bradycardia, so we will not use flecainide with beta-blocker.  Will avoid amiodarone as this will hopefully be a short-term usage.    Lightheadedness  Likely symptoms correlate with A. fib with RVR.  Current resting heart rates are controlled.  Patient was advised he may exercise, but needs to slow  down when he is symptomatic with shortness of breath or lightheadedness.    Hyperlipidemia  Appears patient's PCP has addressed through patient messages, however will confirm with Dr. Juarez if he is okay to decrease the patient's Crestor to 10 mg daily as his prior elevated lipid panels have been nonfasting.  His most recent fasting lipid panel shows an LDL of 88.    [id number] score of 0 and on Eliquis.      Plan:  1.  Proceed with planning scheduled ablation for patient.  He will continue Eliquis in the meantime, he has no missed doses since started on 11/10/2019.  2.  We will start low-dose of sotalol if ablation is to be scheduled more than 10 days away.     History of Present Illness/Subjective    Mr. Juancarlos Causey is a 44 y.o. male with medical history significant for hyperlipidemia.  Patient is currently on Crestor, will review current dosage with Dr. Juarez per patient's request.  Patient states that his 2 lipid panels that were used to diagnose hyperlipidemia were not fasting lipid panels.    Patient noted a fluttering in his chest on 11/6/2019, started Eliquis on 11/10/2019, came to see Dr. Juarez on 11/11/2019, confirmation with EKG that patient was in atrial fibrillation, and scheduled for FREDDY and cardioversion.  Patient underwent successful cardioversion on 11/12/2019.  His baseline heart rates are bradycardic.  He does exercise on a regular basis.  He noticed that last Wednesday, 11/27/2019 he started having fluttering in his chest again.  He states initially, he seemed to go in and out of the arrhythmia.  Today his heart rhythm is irregular.  He states at times he becomes lightheaded and feels like his heart is pounding quite fast.    He  denies shortness of breath, paroxysmal nocturnal dyspnea, orthopnea,pre-syncope, or syncope.  Definite activity intolerance.  He also has episodes of lightheadedness and fatigue.  He is unable to exercise without symptoms    ECG: Personally reviewed  initial diagnosis 11/11/2019 atrial fibrillation at 85 bpm  EKG post cardioversion 11/12/2019 sinus bradycardia at 52 bpm    Transesophageal ECHOCARDIOGRAM 11/12/19:     Normal left ventricular size and systolic function.    Left ventricle ejection fraction is normal. The estimated left ventricular ejection fraction is 60%.    Normal right ventricular size and systolic function.    No evidence of thrombus in the left atrial appendage    No significant valvular abnormalities    Nuclear stress test 11/14/2019    The nuclear stress test is negative for inducible myocardial ischemia or infarction.  ?  The left ventricular ejection fraction at stress is 52%.  ?  There is no prior study for comparison.       Physical Examination Review of Systems   Vitals:    12/02/19 1323   BP: 96/70   Pulse: 64   Resp: 16     Body mass index is 29.03 kg/m .  Wt Readings from Last 3 Encounters:   12/02/19 220 lb (99.8 kg)   11/25/19 220 lb (99.8 kg)   11/12/19 220 lb (99.8 kg)       General Appearance:   No acute distress, normal body habitus   ENT/Mouth: membranes moist, no oral lesions or bleeding gums.      EYES:  no scleral icterus, normal conjunctivae   Neck: no carotid bruits or thyromegaly   Chest/Lungs:   lungs are clear to auscultation, no rales or wheezing,    Cardiovascular:    Irregularly irregular rhythm. Normal first and second heart sounds with no murmurs, rubs, or gallops; the radial and posterior tibial pulses are intact, no JVD, no edema bilaterally    Abdomen:  no organomegaly, masses, bruits, or tenderness; bowel sounds are present   Extremities: no cyanosis or clubbing   Skin: Dry, warm, intact.    Neurologic: normal  bilateral, no tremors     Psychiatric: alert and oriented x3, calm     General: Night Sweats  Eyes: WNL  Ears/Nose/Throat: WNL  Lungs: WNL  Heart: Shortness of Breath with activity, Irregular Heartbeat, Fainting  Stomach: WNL  Bladder: WNL  Muscle/Joints: WNL  Skin: WNL  Nervous System:  Dizziness  Mental Health: WNL     Blood: WNL     Medical History  Surgical History Family History Social History   Past Medical History:   Diagnosis Date   ? Atrial fibrillation (H)    ? Hyperlipidemia     Past Surgical History:   Procedure Laterality Date   ? CARDIOVERSION  11/12/2019   ? HERNIA REPAIR Bilateral     Family History   Problem Relation Age of Onset   ? Breast cancer Mother    ? Colon cancer Mother    ? Diabetes Father    ? Atrial fibrillation Father    ? Arthritis Maternal Grandmother     Social History     Socioeconomic History   ? Marital status:      Spouse name: Not on file   ? Number of children: Not on file   ? Years of education: Not on file   ? Highest education level: Not on file   Occupational History   ? Not on file   Social Needs   ? Financial resource strain: Not on file   ? Food insecurity:     Worry: Not on file     Inability: Not on file   ? Transportation needs:     Medical: Not on file     Non-medical: Not on file   Tobacco Use   ? Smoking status: Never Smoker   ? Smokeless tobacco: Never Used   Substance and Sexual Activity   ? Alcohol use: Yes     Frequency: 4 or more times a week   ? Drug use: Never   ? Sexual activity: Yes   Lifestyle   ? Physical activity:     Days per week: Not on file     Minutes per session: Not on file   ? Stress: Not on file   Relationships   ? Social connections:     Talks on phone: Not on file     Gets together: Not on file     Attends Denominational service: Not on file     Active member of club or organization: Not on file     Attends meetings of clubs or organizations: Not on file     Relationship status: Not on file   ? Intimate partner violence:     Fear of current or ex partner: Not on file     Emotionally abused: Not on file     Physically abused: Not on file     Forced sexual activity: Not on file   Other Topics Concern   ? Not on file   Social History Narrative   ? Not on file          Medications  Allergies   Current Outpatient Medications    Medication Sig Dispense Refill   ? ELIQUIS 5 mg Tab tablet Take 1 tablet (5 mg total) by mouth 2 (two) times a day. 60 tablet 2   ? rosuvastatin (CRESTOR) 20 MG tablet Take 1 tablet (20 mg total) by mouth at bedtime. 90 tablet 11     No current facility-administered medications for this visit.     No Known Allergies      Lab Results    Chemistry/lipid CBC Cardiac Enzymes/BNP/TSH/INR   Lab Results   Component Value Date    CHOL 143 11/25/2019    HDL 43 11/25/2019    LDLCALC 88 11/25/2019    TRIG 62 11/25/2019    CREATININE 0.77 11/25/2019    BUN 9 11/25/2019    K 4.8 11/25/2019     11/25/2019     11/25/2019    CO2 27 11/25/2019    Lab Results   Component Value Date    WBC 4.4 01/20/2017    HGB 16.1 01/20/2017    HCT 46.7 01/20/2017    MCV 93 01/20/2017     01/20/2017    Lab Results   Component Value Date    TSH 1.42 04/23/2019

## 2021-06-28 NOTE — PROGRESS NOTES
"Progress Notes by Kal Hoover MD at 3/24/2020  8:50 AM     Author: Kal Hoover MD Service: -- Author Type: Physician    Filed: 3/24/2020  9:13 AM Encounter Date: 3/24/2020 Status: Signed    : Kla Hoover MD (Physician)       The patient has been notified of following:     \"This telephone visit will be conducted via a call between you and your physician/provider. We have found that certain health care needs can be provided without the need for a physical exam.  This service lets us provide the care you need with a phone conversation.  If a prescription is necessary we can send it directly to your pharmacy.  If lab work is needed we can place an order for that and you can then stop by our lab to have the test done at a later time. If during the course of the call the physician/provider feels a telephone visit is not appropriate, you will not be charged for this service.\"         HEART CARE PHONE ENCOUNTER        Appointment is being conducted as a telephone visit, to reduce risk of exposure given the current status of Coronavirus outbreak in our community. This telephone visit is being conducted via a call between the physician/provider and the patient. Health care needs are being provided without a physical exam.     Assessment/Recommendations   Assessment:      Paroxysmal atrial fibrillation: The patient is 3 months out from his ablation procedure to treat his atrial fibrillation.  The patient has experienced no symptoms suggesting recurrence of atrial fibrillation or flutter.  He has had no ECG documented recurrence of atrial fibrillation or flutter.  The patient is off membrane active and rhythmic drug therapy.  The patient has a low [id number] score and can safely come off oral anticoagulant therapy and be maintained on low-dose daily aspirin.    Plan:    Discontinue Eliquis    Initiate aspirin 81 mg daily    Follow-up in the A. fib clinic with the EP nurse practitioner in 8 months.  The " "patient will contact us in the interim if he has recurrence of his arrhythmia.    I have reviewed the note as documented.  This accurately captures the substance of my conversation with the patient.    Total time of call between patient and provider was 7 minutes        History of Present Illness/Subjective    Juancarlos Causey is a 44 y.o. male who is being evaluated via a billable telephone visit.    Patient reports that following his ablation in December.  His post procedure healing was uneventful.  He reports no palpitations or other symptoms suggesting return of atrial fibrillation or flutter.  He is returned to full activities and reports no exertional dyspnea or chest pain.  He is not having any orthopnea, PND or ankle edema.  He reports no other change in his general medical condition.  He did have some \"heavy heartbeats\" early after his procedure which were described as single events in an otherwise regular rhythm.  These correlated with his PACs when he wore his patient activated monitor.    I have reviewed and updated the patient's Past Medical History, Social History, Family History and Medication List.     Physical Examination not perform given phone encounter Review of Systems       See Phone Notes review of systems by  Mary Ramirez CMA                                         Medical History  Surgical History Family History Social History   Past Medical History:   Diagnosis Date   ? Atrial fibrillation (H)    ? Hyperlipidemia     Past Surgical History:   Procedure Laterality Date   ? CARDIOVERSION  11/12/2019   ? EP ABLATION AFLUTTER  12/31/2019    Procedure: EP Ablation Atrial Flutter;  Surgeon: Kal Hoover MD;  Location: [facility] Cath Lab;  Service: Cardiology   ? EP ABLATION PVI Left 12/31/2019    Procedure: EP Ablation PVI;  Surgeon: Kal Hoover MD;  Location: [facility] Cath Lab;  Service: Cardiology   ? HERNIA REPAIR Bilateral     Family History   Problem Relation Age of " Onset   ? Breast cancer Mother    ? Colon cancer Mother    ? Diabetes Father    ? Atrial fibrillation Father    ? Arthritis Maternal Grandmother     Social History     Socioeconomic History   ? Marital status:      Spouse name: Not on file   ? Number of children: Not on file   ? Years of education: Not on file   ? Highest education level: Not on file   Occupational History   ? Not on file   Social Needs   ? Financial resource strain: Not on file   ? Food insecurity     Worry: Not on file     Inability: Not on file   ? Transportation needs     Medical: Not on file     Non-medical: Not on file   Tobacco Use   ? Smoking status: Never Smoker   ? Smokeless tobacco: Never Used   Substance and Sexual Activity   ? Alcohol use: Yes     Frequency: 4 or more times a week   ? Drug use: Never   ? Sexual activity: Yes   Lifestyle   ? Physical activity     Days per week: Not on file     Minutes per session: Not on file   ? Stress: Not on file   Relationships   ? Social connections     Talks on phone: Not on file     Gets together: Not on file     Attends Yazdanism service: Not on file     Active member of club or organization: Not on file     Attends meetings of clubs or organizations: Not on file     Relationship status: Not on file   ? Intimate partner violence     Fear of current or ex partner: Not on file     Emotionally abused: Not on file     Physically abused: Not on file     Forced sexual activity: Not on file   Other Topics Concern   ? Not on file   Social History Narrative   ? Not on file          Medications  Allergies   Current Outpatient Medications   Medication Sig Dispense Refill   ? ELIQUIS 5 mg Tab tablet Take 1 tablet (5 mg total) by mouth 2 (two) times a day. 60 tablet 1   ? magnesium citrate, bulk, Powd Take by mouth daily.       No current facility-administered medications for this visit.     No Known Allergies      Lab Results    Chemistry/lipid CBC Cardiac Enzymes/BNP/TSH/INR   Lab Results   Component  Value Date    CHOL 143 11/25/2019    HDL 43 11/25/2019    LDLCALC 88 11/25/2019    TRIG 62 11/25/2019    CREATININE 0.77 12/26/2019    BUN 10 12/26/2019    K 4.5 12/26/2019     12/26/2019     12/26/2019    CO2 26 12/26/2019    Lab Results   Component Value Date    WBC 5.0 12/26/2019    HGB 17.0 12/26/2019    HCT 50.0 12/26/2019    MCV 90 12/26/2019     12/26/2019    Lab Results   Component Value Date    TSH 1.42 04/23/2019        Kal Hoover

## 2021-06-28 NOTE — PROGRESS NOTES
Progress Notes by Brittanie Marlow CNP at 2/11/2020  8:30 AM     Author: Brittanie Marlow CNP Service: -- Author Type: Nurse Practitioner    Filed: 2/11/2020  9:10 AM Encounter Date: 2/11/2020 Status: Signed    : Brittanie Marlow CNP (Nurse Practitioner)         Thank you, Dr. Ornelas, for asking the [facility] team to see Mr. Juancarlos Causey.      Assessment/Recommendations   Assessment:    Diagnoses and all orders for this visit:    Status post ablation of atrial fibrillation/atrial flutter  He is doing well.  Off of aspirin and Pepcid.  He does continue on Eliquis.  His chads 2 Vasc score is 0.  He is continued to have some palpitations.  Likely PVCs as evidenced by post ablation EKG.  Overall, he is doing well.  In 2 weeks we will have him wear a 14-day ELISABET monitor, keep 3-month follow-up with Dr. Hoover.  At that time they will discuss most likely coming off of anticoagulation.      PAF (paroxysmal atrial fibrillation) (H)  Patient was highly symptomatic, therefore underwent PVI and RA CTI with Dr. Hoover.  He will continue Eliquis for at least 3 months post ablation.  Again reiterated even though he is having palpitations, it may take 3 months for scar tissue to fully form.  We also discussed that at some point in his future he may require additional ablation, although current voltage in the left atrium is normal    Mixed hyperlipidemia  Previous PCP had patient on statin therapy, which patient is against using.  He has made dietary modifications.  Current LDL is less than 130.  He will continue to make dietary modifications and continue follow-up with new PCP.    [id number] score of 0 and on Eliquis.  Follow up in 6 weeks.    Plan:  ELISABET monitor to assess for silent arrhythmias follow-up with Dr. Hoover at 3 months     History of Present Illness/Subjective    Mr. Juancarlos Causey is a 44 y.o. male with past medical history for questionable hyperlipidemia.   "Juancarlos is here for 6-week follow-up from ablation with Dr. Hoover 12/31/2019.  Patient underwent cryo balloon ablation to 5 pulmonary veins in addition to RA CTI as patient had onset of atrial flutter during ablation.  Patient first noted fluttering in his chest 11/6/2019, underwent an EKG on 11/10/2019, at which time he was started on Eliquis.  He underwent successful cardioversion on 11/12/2019.  However, he continues to have palpitations and lightheadedness.  Due to bradycardia difficult to start antiarrhythmic medications, he did not tolerate metoprolol well.    He  denies chest pain, shortness of breath, paroxysmal nocturnal dyspnea, orthopnea, lightheadedness, dizziness, pre-syncope, or syncope.  In the first couple of days after his ablation he noted \"thumping\" in his chest quite frequently.  Since then it has settled down.  On occasion, for a couple of minutes he will note a pounding heartbeat, although it feels regular.  His activity tolerance has returned to normal.    ECG: Personally reviewed 1/3/2020. sinus bradycardia with frequent PVCs    ECHOCARDIOGRAM 1/1/2020:     When compared to the previous study (FREDDY) dated 11/12/2019, there has been so significant interval change. No percardial effusion noted    Left ventricle ejection fraction is normal. The calculated left ventricular ejection fraction is 62%.    Normal left ventricular size.    Normal right ventricular size and systolic function.    No pericardial effusion. Pericardium is normal.         Physical Examination Review of Systems   Vitals:    02/11/20 0828   BP: 126/82   Pulse: 66   Resp: 18     Body mass index is 28.37 kg/m .  Wt Readings from Last 3 Encounters:   02/11/20 215 lb (97.5 kg)   01/03/20 221 lb 11.2 oz (100.6 kg)   01/01/20 221 lb 0.4 oz (100.3 kg)       General Appearance:   No acute distress, normal body habitus   ENT/Mouth: membranes moist, no oral lesions or bleeding gums.      EYES:  no scleral icterus, normal conjunctivae "   Neck: no carotid bruits or thyromegaly   Chest/Lungs:   lungs are clear to auscultation, no rales or wheezing,    Cardiovascular:    Regular rhythm. Normal first and second heart sounds with no murmurs, rubs, or gallops; the radial and posterior tibial pulses are intact, no JVD, no edema bilaterally    Abdomen:  no organomegaly, masses, bruits, or tenderness; bowel sounds are present   Extremities: no cyanosis or clubbing   Skin: Dry, warm, intact.    Neurologic: normal  bilateral, no tremors     Psychiatric: alert and oriented x3, calm     General: WNL  Eyes: WNL  Ears/Nose/Throat: WNL  Lungs: WNL  Heart: WNL  Stomach: WNL  Bladder: WNL  Muscle/Joints: WNL  Skin: WNL  Nervous System: WNL  Mental Health: WNL     Blood: WNL     Medical History  Surgical History Family History Social History   Past Medical History:   Diagnosis Date   ? Atrial fibrillation (H)    ? Hyperlipidemia     Past Surgical History:   Procedure Laterality Date   ? CARDIOVERSION  11/12/2019   ? EP ABLATION AFLUTTER  12/31/2019    Procedure: EP Ablation Atrial Flutter;  Surgeon: Kal Hoover MD;  Location: [facility] Cath Lab;  Service: Cardiology   ? EP ABLATION PVI Left 12/31/2019    Procedure: EP Ablation PVI;  Surgeon: Kal Hoover MD;  Location: [facility] Cath Lab;  Service: Cardiology   ? HERNIA REPAIR Bilateral     Family History   Problem Relation Age of Onset   ? Breast cancer Mother    ? Colon cancer Mother    ? Diabetes Father    ? Atrial fibrillation Father    ? Arthritis Maternal Grandmother     Social History     Socioeconomic History   ? Marital status:      Spouse name: Not on file   ? Number of children: Not on file   ? Years of education: Not on file   ? Highest education level: Not on file   Occupational History   ? Not on file   Social Needs   ? Financial resource strain: Not on file   ? Food insecurity:     Worry: Not on file     Inability: Not on file   ? Transportation needs:     Medical: Not on file      Non-medical: Not on file   Tobacco Use   ? Smoking status: Never Smoker   ? Smokeless tobacco: Never Used   Substance and Sexual Activity   ? Alcohol use: Yes     Frequency: 4 or more times a week   ? Drug use: Never   ? Sexual activity: Yes   Lifestyle   ? Physical activity:     Days per week: Not on file     Minutes per session: Not on file   ? Stress: Not on file   Relationships   ? Social connections:     Talks on phone: Not on file     Gets together: Not on file     Attends Episcopal service: Not on file     Active member of club or organization: Not on file     Attends meetings of clubs or organizations: Not on file     Relationship status: Not on file   ? Intimate partner violence:     Fear of current or ex partner: Not on file     Emotionally abused: Not on file     Physically abused: Not on file     Forced sexual activity: Not on file   Other Topics Concern   ? Not on file   Social History Narrative   ? Not on file          Medications  Allergies   Current Outpatient Medications   Medication Sig Dispense Refill   ? ELIQUIS 5 mg Tab tablet Take 1 tablet (5 mg total) by mouth 2 (two) times a day. 60 tablet 1   ? magnesium citrate, bulk, Powd Take by mouth daily.       No current facility-administered medications for this visit.     No Known Allergies      Lab Results    Chemistry/lipid CBC Cardiac Enzymes/BNP/TSH/INR   Lab Results   Component Value Date    CHOL 143 11/25/2019    HDL 43 11/25/2019    LDLCALC 88 11/25/2019    TRIG 62 11/25/2019    CREATININE 0.77 12/26/2019    BUN 10 12/26/2019    K 4.5 12/26/2019     12/26/2019     12/26/2019    CO2 26 12/26/2019    Lab Results   Component Value Date    WBC 5.0 12/26/2019    HGB 17.0 12/26/2019    HCT 50.0 12/26/2019    MCV 90 12/26/2019     12/26/2019    Lab Results   Component Value Date    TSH 1.42 04/23/2019

## 2021-06-28 NOTE — PROGRESS NOTES
Progress Notes by Stephanie Ferrera RN at 12/4/2019  1:51 PM     Author: Stephanie Ferrera RN Service: -- Author Type: Registered Nurse    Filed: 12/4/2019  2:15 PM Encounter Date: 12/4/2019 Status: Signed    : Stephanie Ferrera RN (Registered Nurse)       1975  Home:[phone] (home) Cell:[phone] (mobile)  Emergency Contact: Lizzette Causey [phone]    +++Important patient information for CSC/Cath Lab staff : None+++    [facility] EP Cath Lab Procedure Order   Ablation Type:  PVI- Atrial Fibrillation  Specific location of pathway: Left     Diagnosis:  AF  Anticipated Case Duration:  PVI- 4 hr, ok to schedule 2 ablations including PVIs on scheduled date   Scheduling Needs/Timeframe:  ASAP Please call pt to schedule. Pt would like December if possible.  EP RN Follow Up Apt: CV  to schedule EP RN follow up apt in the DT clinic, 3-4 days after ablation for suture removal/assessment    MD Preference: Dr Kiko NOEL Assist:  No Specific MD:  N/A    Current Device: None None  Device Company/Device Rep Needed for Procedure: None    Pre-Procedural Testing needed: Pulmonary Vein CT/MRI  Mapping System Required:  MALCOLM  ICE Needed:  Yes  Special equipment/Staff needed for case: None  Anesthesia:  General-Whole Case  Research Protocol:  No    [facility] EP Cath Lab Prep   Ordering Provider: Dr Hoover  Ordering Date: 12/4/2019  Orders Status: Intial order placed and Order set placed    H&P:  Compled by Brittanie Marlow on 12/2/19 if scheduled within 30 days, pt to schedule with PMD if procedure outside of this timeframe. EP NP can complete phone teach to avoid pt having to come in for a separate apt.  PCP: Jerome Ornelas MD, [phone]    Pre-op Labs: Ordered AM of procedure    Medical Records Pertinent for Procedure:  Stress Test and EKG 11/12/19    Patient Education:    Teach with Patient: Will be completed via phone prior to procedure.    Risks Reviewed:     Pulmonary  Vein/AF/Radiofrequency Ablation  In addition to standard risks for Radiofrequency Ablation, there is:    <2% for significant pulmonary vein stenosis    <2% risk for embolic events    <1% risk for esophageal fistula    <1% risk death    Pulmonary Vein Isolation / Cryoablation Risks:    1-2% risk for phrenic nerve paralysis    <1% risk for pulmonary vein stenosis    Risk of esophageal irritation with no incidence of atrial-esophageal fistula    Rare cryoballoon rupture    <1% risk death      Pre-Procedure Instructions that were Reviewed with Patient:  NPO after midnight, Remove all jewelry prior to coming in for procedure, Shower prior to arrival, Notified patient of time and date of procedure by CV , Transportation arrangements needed s/p procedure, Post-procedure follow up process, Sedation plan/orders and Pre-procedure letter was sent to pt by CV     Pre-Procedure Medication Instructions:  Instructions given to pt regarding anticoagulants: Eliquis- instructed to continue anticoagulation uninterrupted through their procedure  Instructions given to pt regarding antiarrhythmic medication: Sotalol; Pt instructed to hold 3 days prior to procedure  Instructions for medication, other than anticoagulants/antiarrhythmics listed above, given to pt: to start Pepcid 20mg BID 3 days prior to PVI, and to continue for 3 week s/p    No Known Allergies    Current Outpatient Medications:   ?  ELIQUIS 5 mg Tab tablet, Take 1 tablet (5 mg total) by mouth 2 (two) times a day., Disp: 60 tablet, Rfl: 2  ?  rosuvastatin (CRESTOR) 20 MG tablet, Take 1 tablet (20 mg total) by mouth at bedtime., Disp: 90 tablet, Rfl: 11  ?  sotalol (BETAPACE) 80 MG tablet, Take 0.5 tablets (40 mg total) by mouth 2 (two) times a day., Disp: 30 tablet, Rfl: 3    Documentation Date:12/4/2019 1:51 PM  Stephanie Ferrera, Kal Grant MD Willgohs, Brittanie Falk, CNP; P [facility] Rn Support Pool             Patient's chart was  reviewed.  Second episode of atrial fibrillation and symptomatic.  Patient is not an ideal long-term candidate for intermittent drug therapy due to bradycardia.  Okay to move forward with scheduling patient for PVI:  Schedule: 4 hours (okay for double up/complex second procedure).  Mapping system: MALCOLM or Carto  Cardiac CT/MRI: Yes  FREDDY: No if compliant with therapy.  Thanks  Kal

## 2021-07-03 NOTE — ADDENDUM NOTE
Addendum Note by Nichole Vilchis MD at 11/11/2019  1:50 PM     Author: Nichole Vilchis MD Service: -- Author Type: Physician    Filed: 11/11/2019  3:00 PM Encounter Date: 11/11/2019 Status: Signed    : Nichole Vilchis MD (Physician)    Addended by: NICHOLE VILCHIS on: 11/11/2019 03:00 PM        Modules accepted: Orders

## 2021-07-03 NOTE — ANESTHESIA PREPROCEDURE EVALUATION
Anesthesia Preprocedure Evaluation by Mohsen Pereira MD at 12/31/2019  6:50 AM     Author: Mohsen Pereira MD Service: -- Author Type: Physician    Filed: 12/31/2019  7:07 AM Date of Service: 12/31/2019  6:50 AM Status: Addendum    : Mohsen Pereira MD (Physician)    Related Notes: Original Note by Mohsen Pereira MD (Physician) filed at 12/31/2019  6:51 AM       Anesthesia Evaluation      Patient summary reviewed   No history of anesthetic complications     Airway   Mallampati: II  Neck ROM: full   Pulmonary - negative ROS and normal exam    breath sounds clear to auscultation                         Cardiovascular   Exercise tolerance: > or = 4 METS  (+) dysrhythmias (afib), , hypercholesterolemia,     (-) murmur  Rhythm: regular  Rate: normal,    no murmur      Neuro/Psych - negative ROS     Endo/Other - negative ROS      GI/Hepatic/Renal - negative ROS      Other findings: Labs 12/26/19:  WBC 5.0, Hgb 17.0, Plt 244  Na 139, K 4.5, BUN 10, Cr 0.77      Dental - normal exam   (+) caps                           Anesthesia Plan  Planned anesthetic: general endotracheal  GETA.  Decadron (10 mg) and zofran for PONV ppx.  ASA 2   Induction: intravenous   Anesthetic plan and risks discussed with: patient  Anesthesia plan special considerations: antiemetics,   Post-op plan: routine recovery

## 2021-10-10 ENCOUNTER — HEALTH MAINTENANCE LETTER (OUTPATIENT)
Age: 46
End: 2021-10-10

## 2022-07-16 ENCOUNTER — HEALTH MAINTENANCE LETTER (OUTPATIENT)
Age: 47
End: 2022-07-16

## 2022-09-18 ENCOUNTER — HEALTH MAINTENANCE LETTER (OUTPATIENT)
Age: 47
End: 2022-09-18

## 2023-05-20 ENCOUNTER — APPOINTMENT (OUTPATIENT)
Dept: CT IMAGING | Facility: CLINIC | Age: 48
End: 2023-05-20
Attending: EMERGENCY MEDICINE
Payer: COMMERCIAL

## 2023-05-20 ENCOUNTER — HOSPITAL ENCOUNTER (OUTPATIENT)
Facility: CLINIC | Age: 48
Setting detail: OBSERVATION
Discharge: HOME | End: 2023-05-21
Attending: EMERGENCY MEDICINE | Admitting: SURGERY
Payer: COMMERCIAL

## 2023-05-20 DIAGNOSIS — K61.1 PERIRECTAL ABSCESS: ICD-10-CM

## 2023-05-20 LAB
ANION GAP SERPL CALCULATED.3IONS-SCNC: 15 MMOL/L (ref 7–15)
BASOPHILS # BLD AUTO: 0.1 10E3/UL (ref 0–0.2)
BASOPHILS NFR BLD AUTO: 0 %
BUN SERPL-MCNC: 13 MG/DL (ref 6–20)
CALCIUM SERPL-MCNC: 9.4 MG/DL (ref 8.6–10)
CHLORIDE SERPL-SCNC: 98 MMOL/L (ref 98–107)
CREAT SERPL-MCNC: 0.84 MG/DL (ref 0.67–1.17)
CRP SERPL-MCNC: 83.8 MG/L
DEPRECATED HCO3 PLAS-SCNC: 24 MMOL/L (ref 22–29)
EOSINOPHIL # BLD AUTO: 0.1 10E3/UL (ref 0–0.7)
EOSINOPHIL NFR BLD AUTO: 0 %
ERYTHROCYTE [DISTWIDTH] IN BLOOD BY AUTOMATED COUNT: 11.9 % (ref 10–15)
GFR SERPL CREATININE-BSD FRML MDRD: >90 ML/MIN/1.73M2
GLUCOSE SERPL-MCNC: 123 MG/DL (ref 70–99)
HCT VFR BLD AUTO: 43.8 % (ref 40–53)
HGB BLD-MCNC: 14.6 G/DL (ref 13.3–17.7)
IMM GRANULOCYTES # BLD: 0.1 10E3/UL
IMM GRANULOCYTES NFR BLD: 0 %
LYMPHOCYTES # BLD AUTO: 1.7 10E3/UL (ref 0.8–5.3)
LYMPHOCYTES NFR BLD AUTO: 13 %
MCH RBC QN AUTO: 30.4 PG (ref 26.5–33)
MCHC RBC AUTO-ENTMCNC: 33.3 G/DL (ref 31.5–36.5)
MCV RBC AUTO: 91 FL (ref 78–100)
MONOCYTES # BLD AUTO: 2.3 10E3/UL (ref 0–1.3)
MONOCYTES NFR BLD AUTO: 17 %
NEUTROPHILS # BLD AUTO: 9.2 10E3/UL (ref 1.6–8.3)
NEUTROPHILS NFR BLD AUTO: 70 %
NRBC # BLD AUTO: 0 10E3/UL
NRBC BLD AUTO-RTO: 0 /100
PLATELET # BLD AUTO: 252 10E3/UL (ref 150–450)
POTASSIUM SERPL-SCNC: 3.7 MMOL/L (ref 3.4–5.3)
RBC # BLD AUTO: 4.81 10E6/UL (ref 4.4–5.9)
SODIUM SERPL-SCNC: 137 MMOL/L (ref 136–145)
WBC # BLD AUTO: 13.4 10E3/UL (ref 4–11)

## 2023-05-20 PROCEDURE — 72193 CT PELVIS W/DYE: CPT

## 2023-05-20 PROCEDURE — 82310 ASSAY OF CALCIUM: CPT | Performed by: EMERGENCY MEDICINE

## 2023-05-20 PROCEDURE — 99285 EMERGENCY DEPT VISIT HI MDM: CPT | Mod: 25 | Performed by: EMERGENCY MEDICINE

## 2023-05-20 PROCEDURE — 258N000003 HC RX IP 258 OP 636: Performed by: EMERGENCY MEDICINE

## 2023-05-20 PROCEDURE — 250N000013 HC RX MED GY IP 250 OP 250 PS 637: Performed by: EMERGENCY MEDICINE

## 2023-05-20 PROCEDURE — 250N000011 HC RX IP 250 OP 636: Performed by: EMERGENCY MEDICINE

## 2023-05-20 PROCEDURE — 72193 CT PELVIS W/DYE: CPT | Mod: 26 | Performed by: RADIOLOGY

## 2023-05-20 PROCEDURE — 86140 C-REACTIVE PROTEIN: CPT | Performed by: EMERGENCY MEDICINE

## 2023-05-20 PROCEDURE — 96366 THER/PROPH/DIAG IV INF ADDON: CPT

## 2023-05-20 PROCEDURE — 96366 THER/PROPH/DIAG IV INF ADDON: CPT | Performed by: EMERGENCY MEDICINE

## 2023-05-20 PROCEDURE — 85025 COMPLETE CBC W/AUTO DIFF WBC: CPT | Performed by: EMERGENCY MEDICINE

## 2023-05-20 PROCEDURE — 36415 COLL VENOUS BLD VENIPUNCTURE: CPT | Performed by: EMERGENCY MEDICINE

## 2023-05-20 PROCEDURE — 96365 THER/PROPH/DIAG IV INF INIT: CPT | Mod: 59 | Performed by: EMERGENCY MEDICINE

## 2023-05-20 PROCEDURE — 99285 EMERGENCY DEPT VISIT HI MDM: CPT | Performed by: EMERGENCY MEDICINE

## 2023-05-20 RX ORDER — LIDOCAINE HYDROCHLORIDE AND EPINEPHRINE 10; 10 MG/ML; UG/ML
10 INJECTION, SOLUTION INFILTRATION; PERINEURAL ONCE
Status: DISCONTINUED | OUTPATIENT
Start: 2023-05-20 | End: 2023-05-21 | Stop reason: HOSPADM

## 2023-05-20 RX ORDER — HYDROMORPHONE HYDROCHLORIDE 1 MG/ML
0.5 INJECTION, SOLUTION INTRAMUSCULAR; INTRAVENOUS; SUBCUTANEOUS EVERY 30 MIN PRN
Status: DISCONTINUED | OUTPATIENT
Start: 2023-05-20 | End: 2023-05-21

## 2023-05-20 RX ORDER — SODIUM CHLORIDE 9 MG/ML
INJECTION, SOLUTION INTRAVENOUS CONTINUOUS
Status: DISCONTINUED | OUTPATIENT
Start: 2023-05-20 | End: 2023-05-21 | Stop reason: HOSPADM

## 2023-05-20 RX ORDER — IOPAMIDOL 755 MG/ML
135 INJECTION, SOLUTION INTRAVASCULAR ONCE
Status: COMPLETED | OUTPATIENT
Start: 2023-05-20 | End: 2023-05-20

## 2023-05-20 RX ORDER — ACETAMINOPHEN 500 MG
1000 TABLET ORAL ONCE
Status: COMPLETED | OUTPATIENT
Start: 2023-05-20 | End: 2023-05-20

## 2023-05-20 RX ORDER — AMPICILLIN AND SULBACTAM 2; 1 G/1; G/1
3 INJECTION, POWDER, FOR SOLUTION INTRAMUSCULAR; INTRAVENOUS EVERY 6 HOURS
Status: DISCONTINUED | OUTPATIENT
Start: 2023-05-20 | End: 2023-05-21 | Stop reason: HOSPADM

## 2023-05-20 RX ADMIN — AMPICILLIN SODIUM AND SULBACTAM SODIUM 3 G: 2; 1 INJECTION, POWDER, FOR SOLUTION INTRAMUSCULAR; INTRAVENOUS at 17:02

## 2023-05-20 RX ADMIN — SODIUM CHLORIDE 1000 ML: 9 INJECTION, SOLUTION INTRAVENOUS at 17:01

## 2023-05-20 RX ADMIN — ACETAMINOPHEN 1000 MG: 500 TABLET ORAL at 17:02

## 2023-05-20 RX ADMIN — IOPAMIDOL 135 ML: 755 INJECTION, SOLUTION INTRAVENOUS at 19:39

## 2023-05-20 ASSESSMENT — ACTIVITIES OF DAILY LIVING (ADL)
ADLS_ACUITY_SCORE: 35

## 2023-05-20 NOTE — ED TRIAGE NOTES
Pt ambulates to ed w/ c/o anal abscess, fever x 1 wk. He went to urgent care Wednesday and was prescribed doxycycline which he has been taking. Current temp 101.7. He took tylenol 1000 mg around 30 min ago.

## 2023-05-20 NOTE — ED PROVIDER NOTES
[place] EMERGENCY DEPARTMENT ([facility])  May 20, 2023      History     Chief Complaint   Patient presents with     Fever     Wound Infection     HPI  Juancarlos Causey is a 47 year old male who presents to the emergency department with complaints of rectal pain that he has had over the last week.  Patient states that he was started on some doxycycline in an urgent care a few days ago because of possible infection, as well as a recent tick bite that he has had.  Patient states however that his rectal pain has worsened, and he now presents here for evaluation. The patient states that he is developed some fevers, but has no history of diabetes, has no previous history of Crohn's disease, or colitis, and has no other risk factors.  The patient complains of rectal pain.    This part of the medical record was transcribed by German Cedeno Medical Scribe, from a dictation done by Gene Reis MD.       Past Medical History  Past Medical History:   Diagnosis Date     Atrial fibrillation (H)      Dyslipidemia      Hyperlipidemia      Paroxysmal atrial fibrillation (H)      Past Surgical History:   Procedure Laterality Date     CARDIOVERSION  11/12/2019     EP ABLATION N/A 12/31/2019     EP ABLATION AFLUTTER  12/31/2019    Procedure: EP Ablation Atrial Flutter;  Surgeon: Kal Hoover MD;  Location: [facility];  Service: Cardiology     EP ABLATION PVI Left 12/31/2019    Procedure: EP Ablation PVI;  Surgeon: Kal Hoover MD;  Location: [facility];  Service: Cardiology     HC LAP,INGUINAL HERNIA REPR,INITIAL Bilateral     8 months     HC TOOTH EXTRACTION W/FORCEP       HERNIA REPAIR Bilateral      aspirin (ASA) 81 MG chewable tablet      No Known Allergies     Family History  Family History   Problem Relation Age of Onset     Colon Cancer Mother      Breast Cancer Mother      Atrial fibrillation Father      Urinary tract infection Father         recurrent     Diabetes Type 2   "Father      Hyperlipidemia Sister      No Known Problems Brother      Hyperlipidemia Maternal Grandmother      No Known Problems Sister      Prostate Cancer No family hx of      Diabetes Father      Arthritis Maternal Grandmother      Social History   Social History     Tobacco Use     Smoking status: Never     Smokeless tobacco: Never   Substance Use Topics     Alcohol use: Yes     Alcohol/week: 14.0 standard drinks of alcohol     Types: 14 Glasses of wine per week     Comment: Two 4oz glasses of wine a night     Drug use: Not Currently      Past medical history, past surgical history, medications, allergies, family history, and social history were reviewed with the patient. No additional pertinent items.      A medically appropriate review of systems was performed with pertinent positives and negatives noted in the HPI, and all other systems negative.    Physical Exam   BP: (!) 143/89  Pulse: 96  Temp: (!) 101.7  F (38.7  C)  Resp: 18  Height: 185.4 cm (6' 1\")  Weight: 99.8 kg (220 lb)  SpO2: 96 %  Physical Exam  Vitals and nursing note reviewed.   Constitutional:       Appearance: He is not diaphoretic.   HENT:      Head: Atraumatic.   Eyes:      Extraocular Movements: Extraocular movements intact.      Pupils: Pupils are equal, round, and reactive to light.   Abdominal:      Palpations: Abdomen is soft.      Tenderness: There is no abdominal tenderness.   Genitourinary:     Comments: Rectal area reveals induration and fluctuance over the right perirectal tissue.  Digital rectal exam also reveals tenderness of the right rectum.  Musculoskeletal:      Cervical back: Neck supple.   Neurological:      General: No focal deficit present.      Mental Status: He is alert and oriented to person, place, and time.   Psychiatric:         Mood and Affect: Mood normal.           ED Course, Procedures, & Data      Procedures           Results for orders placed or performed during the hospital encounter of 05/20/23   CBC with " platelets and differential     Status: Abnormal   Result Value Ref Range    WBC Count 13.4 (H) 4.0 - 11.0 10e3/uL    RBC Count 4.81 4.40 - 5.90 10e6/uL    Hemoglobin 14.6 13.3 - 17.7 g/dL    Hematocrit 43.8 40.0 - 53.0 %    MCV 91 78 - 100 fL    MCH 30.4 26.5 - 33.0 pg    MCHC 33.3 31.5 - 36.5 g/dL    RDW 11.9 10.0 - 15.0 %    Platelet Count 252 150 - 450 10e3/uL    % Neutrophils 70 %    % Lymphocytes 13 %    % Monocytes 17 %    % Eosinophils 0 %    % Basophils 0 %    % Immature Granulocytes 0 %    NRBCs per 100 WBC 0 <1 /100    Absolute Neutrophils 9.2 (H) 1.6 - 8.3 10e3/uL    Absolute Lymphocytes 1.7 0.8 - 5.3 10e3/uL    Absolute Monocytes 2.3 (H) 0.0 - 1.3 10e3/uL    Absolute Eosinophils 0.1 0.0 - 0.7 10e3/uL    Absolute Basophils 0.1 0.0 - 0.2 10e3/uL    Absolute Immature Granulocytes 0.1 <=0.4 10e3/uL    Absolute NRBCs 0.0 10e3/uL   CBC with platelets differential     Status: Abnormal    Narrative    The following orders were created for panel order CBC with platelets differential.  Procedure                               Abnormality         Status                     ---------                               -----------         ------                     CBC with platelets and d...[[id number]]  Abnormal            Final result                 Please view results for these tests on the individual orders.       Orders Placed This Encounter   Procedures     CT Pelvis Soft Tissue w Contrast     CRP inflammation     Basic metabolic panel     CBC with platelets and differential     Peripheral IV catheter     CBC with platelets differential         Medications   sodium chloride (PF) 0.9% PF flush 3 mL (3 mLs Intracatheter $Given 5/20/23 3194)   sodium chloride (PF) 0.9% PF flush 3 mL (has no administration in time range)   sodium chloride 0.9% infusion (has no administration in time range)   HYDROmorphone (PF) (DILAUDID) injection 0.5 mg (has no administration in time range)   ampicillin-sulbactam (UNASYN) 3 g vial to  attach to  mL bag (3 g Intravenous $New Bag 5/20/23 1702)   0.9% sodium chloride BOLUS (1,000 mLs Intravenous $New Bag 5/20/23 1701)   acetaminophen (TYLENOL) tablet 1,000 mg (1,000 mg Oral $Given 5/20/23 1702)       CT Pelvis Soft Tissue w Contrast    (Results Pending)       Critical care was not performed.     Medical Decision Making  The patient's presentation was of moderate complexity (an acute illness with systemic symptoms).    The patient's evaluation involved:  ordering and/or review of 3+ test(s) in this encounter (See above)    The patient's management necessitated further care after sign-out to One of my partners taking over the end of my shift (see their note for further management).      Assessment & Plan      I have reviewed the nursing notes.    Patient was started on IV antibiotics and given pain relief.  Patient will undergo pelvic CT of the soft tissue to further identify the patient's perirectal abscess and extent/depth/proximity to sigmoid colon.    I have reviewed the findings, diagnosis, and plan with the patient.        Final diagnoses:   Perirectal abscess       I, German Cedeno, am serving as a trained medical scribe to document services personally performed by Gene Reis MD, based on the provider's statements to me.     I, Gene Reis MD, was physically present and have reviewed and verified the accuracy of this note documented by German Cedeno.      Gene Reis MD  [facility] EMERGENCY DEPARTMENT  5/20/2023     Gene Reis MD  05/20/23 3590

## 2023-05-21 VITALS
RESPIRATION RATE: 18 BRPM | BODY MASS INDEX: 29.16 KG/M2 | DIASTOLIC BLOOD PRESSURE: 80 MMHG | TEMPERATURE: 98 F | HEIGHT: 73 IN | HEART RATE: 61 BPM | WEIGHT: 220 LBS | OXYGEN SATURATION: 97 % | SYSTOLIC BLOOD PRESSURE: 130 MMHG

## 2023-05-21 PROBLEM — K61.1 PERIRECTAL ABSCESS: Status: ACTIVE | Noted: 2023-05-21

## 2023-05-21 LAB
ALBUMIN SERPL BCG-MCNC: 3.6 G/DL (ref 3.5–5.2)
ALP SERPL-CCNC: 70 U/L (ref 40–129)
ALT SERPL W P-5'-P-CCNC: 20 U/L (ref 10–50)
ANION GAP SERPL CALCULATED.3IONS-SCNC: 10 MMOL/L (ref 7–15)
AST SERPL W P-5'-P-CCNC: 20 U/L (ref 10–50)
BASOPHILS # BLD AUTO: 0.1 10E3/UL (ref 0–0.2)
BASOPHILS NFR BLD AUTO: 0 %
BILIRUB SERPL-MCNC: 0.4 MG/DL
BUN SERPL-MCNC: 8.5 MG/DL (ref 6–20)
CALCIUM SERPL-MCNC: 8.7 MG/DL (ref 8.6–10)
CHLORIDE SERPL-SCNC: 104 MMOL/L (ref 98–107)
CREAT SERPL-MCNC: 0.67 MG/DL (ref 0.67–1.17)
DEPRECATED HCO3 PLAS-SCNC: 25 MMOL/L (ref 22–29)
EOSINOPHIL # BLD AUTO: 0.1 10E3/UL (ref 0–0.7)
EOSINOPHIL NFR BLD AUTO: 1 %
ERYTHROCYTE [DISTWIDTH] IN BLOOD BY AUTOMATED COUNT: 11.9 % (ref 10–15)
GFR SERPL CREATININE-BSD FRML MDRD: >90 ML/MIN/1.73M2
GLUCOSE SERPL-MCNC: 109 MG/DL (ref 70–99)
HCT VFR BLD AUTO: 40.1 % (ref 40–53)
HGB BLD-MCNC: 13.4 G/DL (ref 13.3–17.7)
IMM GRANULOCYTES # BLD: 0.1 10E3/UL
IMM GRANULOCYTES NFR BLD: 0 %
LACTATE SERPL-SCNC: 1.2 MMOL/L (ref 0.7–2)
LYMPHOCYTES # BLD AUTO: 1.9 10E3/UL (ref 0.8–5.3)
LYMPHOCYTES NFR BLD AUTO: 16 %
MCH RBC QN AUTO: 30.7 PG (ref 26.5–33)
MCHC RBC AUTO-ENTMCNC: 33.4 G/DL (ref 31.5–36.5)
MCV RBC AUTO: 92 FL (ref 78–100)
MONOCYTES # BLD AUTO: 2.1 10E3/UL (ref 0–1.3)
MONOCYTES NFR BLD AUTO: 17 %
NEUTROPHILS # BLD AUTO: 8.2 10E3/UL (ref 1.6–8.3)
NEUTROPHILS NFR BLD AUTO: 66 %
NRBC # BLD AUTO: 0 10E3/UL
NRBC BLD AUTO-RTO: 0 /100
PLATELET # BLD AUTO: 233 10E3/UL (ref 150–450)
POTASSIUM SERPL-SCNC: 3.7 MMOL/L (ref 3.4–5.3)
PROT SERPL-MCNC: 6.7 G/DL (ref 6.4–8.3)
RBC # BLD AUTO: 4.37 10E6/UL (ref 4.4–5.9)
SODIUM SERPL-SCNC: 139 MMOL/L (ref 136–145)
WBC # BLD AUTO: 12.3 10E3/UL (ref 4–11)

## 2023-05-21 PROCEDURE — 46040 I&D ISCHIORCT&/PERIRCT ABSC: CPT

## 2023-05-21 PROCEDURE — 250N000011 HC RX IP 250 OP 636: Performed by: EMERGENCY MEDICINE

## 2023-05-21 PROCEDURE — 36415 COLL VENOUS BLD VENIPUNCTURE: CPT | Performed by: PHYSICIAN ASSISTANT

## 2023-05-21 PROCEDURE — G0378 HOSPITAL OBSERVATION PER HR: HCPCS

## 2023-05-21 PROCEDURE — 87040 BLOOD CULTURE FOR BACTERIA: CPT | Performed by: EMERGENCY MEDICINE

## 2023-05-21 PROCEDURE — 96366 THER/PROPH/DIAG IV INF ADDON: CPT

## 2023-05-21 PROCEDURE — 80053 COMPREHEN METABOLIC PANEL: CPT | Performed by: PHYSICIAN ASSISTANT

## 2023-05-21 PROCEDURE — 85025 COMPLETE CBC W/AUTO DIFF WBC: CPT | Performed by: PHYSICIAN ASSISTANT

## 2023-05-21 PROCEDURE — 250N000013 HC RX MED GY IP 250 OP 250 PS 637: Performed by: PHYSICIAN ASSISTANT

## 2023-05-21 PROCEDURE — 36415 COLL VENOUS BLD VENIPUNCTURE: CPT | Performed by: EMERGENCY MEDICINE

## 2023-05-21 PROCEDURE — 250N000013 HC RX MED GY IP 250 OP 250 PS 637: Performed by: EMERGENCY MEDICINE

## 2023-05-21 PROCEDURE — 83605 ASSAY OF LACTIC ACID: CPT | Performed by: EMERGENCY MEDICINE

## 2023-05-21 PROCEDURE — 99232 SBSQ HOSP IP/OBS MODERATE 35: CPT | Mod: GC | Performed by: SURGERY

## 2023-05-21 PROCEDURE — 258N000003 HC RX IP 258 OP 636: Performed by: EMERGENCY MEDICINE

## 2023-05-21 RX ORDER — IBUPROFEN 600 MG/1
600 TABLET, FILM COATED ORAL EVERY 6 HOURS PRN
Status: DISCONTINUED | OUTPATIENT
Start: 2023-05-21 | End: 2023-05-21 | Stop reason: HOSPADM

## 2023-05-21 RX ORDER — ONDANSETRON 4 MG/1
4 TABLET, ORALLY DISINTEGRATING ORAL EVERY 6 HOURS PRN
Status: DISCONTINUED | OUTPATIENT
Start: 2023-05-21 | End: 2023-05-21 | Stop reason: HOSPADM

## 2023-05-21 RX ORDER — LIDOCAINE 40 MG/G
CREAM TOPICAL
Status: DISCONTINUED | OUTPATIENT
Start: 2023-05-21 | End: 2023-05-21 | Stop reason: HOSPADM

## 2023-05-21 RX ORDER — ACETAMINOPHEN 325 MG/1
975 TABLET ORAL EVERY 6 HOURS PRN
COMMUNITY
Start: 2023-05-21 | End: 2024-04-23

## 2023-05-21 RX ORDER — ACETAMINOPHEN 500 MG
1000 TABLET ORAL ONCE
Status: COMPLETED | OUTPATIENT
Start: 2023-05-21 | End: 2023-05-21

## 2023-05-21 RX ORDER — ACETAMINOPHEN 325 MG/1
975 TABLET ORAL EVERY 6 HOURS PRN
Status: DISCONTINUED | OUTPATIENT
Start: 2023-05-21 | End: 2023-05-21 | Stop reason: HOSPADM

## 2023-05-21 RX ORDER — ONDANSETRON 2 MG/ML
4 INJECTION INTRAMUSCULAR; INTRAVENOUS EVERY 6 HOURS PRN
Status: DISCONTINUED | OUTPATIENT
Start: 2023-05-21 | End: 2023-05-21 | Stop reason: HOSPADM

## 2023-05-21 RX ORDER — ASPIRIN 81 MG/1
81 TABLET, CHEWABLE ORAL DAILY
Status: DISCONTINUED | OUTPATIENT
Start: 2023-05-21 | End: 2023-05-21 | Stop reason: HOSPADM

## 2023-05-21 RX ADMIN — ASPIRIN 81 MG CHEWABLE TABLET 81 MG: 81 TABLET CHEWABLE at 08:20

## 2023-05-21 RX ADMIN — AMPICILLIN SODIUM AND SULBACTAM SODIUM 3 G: 2; 1 INJECTION, POWDER, FOR SOLUTION INTRAMUSCULAR; INTRAVENOUS at 04:56

## 2023-05-21 RX ADMIN — ACETAMINOPHEN 1000 MG: 500 TABLET ORAL at 02:14

## 2023-05-21 RX ADMIN — SODIUM CHLORIDE: 9 INJECTION, SOLUTION INTRAVENOUS at 00:02

## 2023-05-21 RX ADMIN — AMPICILLIN SODIUM AND SULBACTAM SODIUM 3 G: 2; 1 INJECTION, POWDER, FOR SOLUTION INTRAMUSCULAR; INTRAVENOUS at 10:17

## 2023-05-21 ASSESSMENT — ACTIVITIES OF DAILY LIVING (ADL)
ADLS_ACUITY_SCORE: 35

## 2023-05-21 NOTE — PROGRESS NOTES
Patient discharged to home at 2:13 PM via ambulation. Accompanied by spouse and staff. Discharge instructions reviewed with patient, opportunity offered to ask questions. Prescriptions sent to patients preferred pharmacy. All belongings sent with patient.  piv removed   Janie Hansen RN

## 2023-05-21 NOTE — CONSULTS
"    [facility]    Consult Note - Colorectal Surgery Service  Date of Admission:  5/20/2023  Consult Requested by: Dr. Tiwari  Reason for Consult: Perirectal abscess    Assessment & Plan: Surgery   Juancarlos Causey is a 47 year old male with PMH of PAH, HTN, HLD, atrial flutter s/p ablation and PSH of laparoscopic inguinal hernia repair who presents to the ED on 5/20/2023 with fever and tachycardia concerning for sepsis, with ongoing perirectal pain over the last week, with imaging notable for right sided perirectal abscess . CT imaging shows a hypoattenuating 3.3 cm fluid collection within the soft tissues of the right gluteus with surrounding soft tissue inflammation, consistent with perirectal abscess with phlegmonous change.    Admit to Observation Status   Bedside I&D of perirectal abscess with local anesthesia  Continue IV Unasyn overnight with plan to switch to PO Augmentin, 7 day course, at time of discharge    Drains: None     Code Status:   Full    Clinically Significant Risk Factors Present on Admission                # Drug Induced Platelet Defect: home medication list includes an antiplatelet medication        # Overweight: Estimated body mass index is 29.03 kg/m  as calculated from the following:    Height as of this encounter: 1.854 m (6' 1\").    Weight as of this encounter: 99.8 kg (220 lb).          The patient's care was discussed with the Fellow Dr. Sanchez.    Yusef Lopez MD  [facility]  Non-urgent messages: Securely message with [employer] (more info)  Text page via [employer] Paging/Directory     ______________________________________________________________________    Chief Complaint   Rectal pain    History is obtained from the patient    History of Present Illness   Juancarlos Causey is a 47 year old male with PMH of atrial flutter s/p ablation and PSH of laparoscopic inguinal hernia repair who " presents to the ED on 5/20/2023 with fever and tachycardia concerning for sepsis, with ongoing rectal pain over the last week, with imaging notable for perirectal abscess    Patient states that he felt a bump on his right buttock last week Saturday that progressively got worse. He thought it was chaffing and states that presented to an urgent care for further evaluation on Tuesday, then he was started on Doxycycline BID which he took up until 5/20 AM. He notes that he has had tick bites and rashes in the past, but otherwise is healthy. His rectal pain worsened until he is having trouble walking, voiding. And having stools due to pain. He has continued to have fevers and chills, but has no history of diabetes, has no previous history of Crohn's disease, or ulcer colitis, or immunosuppresion.    Here in ED, T 101.7F, HR 120s. WBC 13.4. CRP 83. Lactate 1.2. CT imaging shows hypoattenuating 3.3 cm fluid collection within the soft tissues of the right gluteus with surrounding soft tissue inflammation, consistent with perirectal abscess with phlegmonous change. No subcutaneous gas or fistulous formation is seen.    Past Medical History    Past Medical History:   Diagnosis Date     Atrial fibrillation (H)      Dyslipidemia      Hyperlipidemia      Paroxysmal atrial fibrillation (H)        Past Surgical History   Past Surgical History:   Procedure Laterality Date     CARDIOVERSION  11/12/2019     EP ABLATION N/A 12/31/2019     EP ABLATION AFLUTTER  12/31/2019    Procedure: EP Ablation Atrial Flutter;  Surgeon: Kal Hoover MD;  Location: [facility];  Service: Cardiology     EP ABLATION PVI Left 12/31/2019    Procedure: EP Ablation PVI;  Surgeon: Kal Hoover MD;  Location: [facility] Cath Lab;  Service: Cardiology     HC LAP,INGUINAL HERNIA REPR,INITIAL Bilateral     8 months     HC TOOTH EXTRACTION W/FORCEP       HERNIA REPAIR Bilateral        Prior to Admission Medications   I have reviewed this  patient's current medications       Review of Systems    The 10 point Review of Systems is negative other than noted in the HPI or here.      Physical Exam   Vital Signs: Temp: (!) 101.7  F (38.7  C) Temp src: Oral BP: (!) 143/89 Pulse: 96   Resp: 18 SpO2: 96 %      Weight: 220 lbs 0 ozNo intake or output data in the 24 hours ending 05/20/23 2125  General Appearance: Alert, uncomfortable  Respiratory: Non labored breathing  Cardiovascular: Tachycardic  GI: Soft, non-distended  Buttocks: R sided palpable fluctuance extending from 3cm lateral to anal verge, extending in a 3 x 3cm area around; some overlying erythema, no crepitus  Skin: [facility]       Data     I have personally reviewed the following data over the past 24 hrs:    13.4 (H)  \   14.6   / 252     137 98 13.0 /  123 (H)   3.7 24 0.84 \       Procal: N/A CRP: 83.80 (H) Lactic Acid: N/A         Imaging results reviewed over the past 24 hrs:   Recent Results (from the past 24 hour(s))   CT Pelvis Soft Tissue w Contrast    Narrative    EXAM: CT PELVIS SOFT TISSUE W CONTRAST 5/20/2023 7:53 PM    HISTORY: 47 years Male rectal abscess    COMPARISON: None.    TECHNIQUE: Axial CT images from the pelvis through the mid thighs were  obtained with IV contrast. Coronal and sagittal reformats provided.  Contrast dose: iopamidol (ISOVUE-370) solution 135 mL.    FINDINGS:  Pelvis: Adjacent to the right posterior lateral margins of the rectum  there is a hypoattenuating fluid collections within the soft tissues  of the right gluteus with rim enhancement, measuring approximately 3.3  x 3.0 x 3.0 cm. There is surrounding soft tissue stranding. No  subcutaneous gas or fistulous formation is seen.     GASTROINTESTINAL: The visualized small bowel is normal in caliber  without abnormal wall thickening. Mild wall thickening is seen along  the right posterior lateral margins of the rectum. No portal venous  gas or pneumatosis. No appendicitis. Colonic diverticulosis  without  diverticulitis.    MESENTERY/PERITONEUM: No ascites or pneumoperitoneum.    Reproductive: Testicular calcifications.    LYMPH NODES: No lymphadenopathy.    VASCULAR: Visualized major abdominal vessels appear patent.    Bones: No acute or suspicious osseous abnormality. Degenerative  changes of the lumbar spine.      Impression    IMPRESSION:   Hypoattenuating 3.3 cm fluid collection within the soft tissues of the  right gluteus with surrounding soft tissue inflammation, consistent  with perirectal abscess with phlegmonous change. No subcutaneous gas  or fistulous formation is seen.

## 2023-05-21 NOTE — PLAN OF CARE
Time 7791-0424    Vitals: VSS on RA  Activity: Up ad akilah  Pain: C/O headache & rectal pain - Tylenol given  Neuro: A&OX4    Cardiac:  WDL  Respiratory:  Denies cough/SOB  GI/: Voids spontaneously, No BM this shift  Diet: Regular  Lines: L PIV infusing NS @ 100ml/hr  Skin/Wounds: Rectal incision  Labs/imaging: Reviewed        New changes this shift:  Diaphoretic overnight.     Plan: Continue IVF and IV antibiotics and pain management. See observation goals.     Continue to monitor and follow POC     Goal Outcome Evaluation:      Plan of Care Reviewed With: patient    Overall Patient Progress: no changeOverall Patient Progress: no change    Outcome Evaluation: Continue IV antibiotics and pain management

## 2023-05-21 NOTE — PROGRESS NOTES
[facility]    ED Observation Progress Note - ED Observation  Date of Admission:  5/20/2023    Assessment & Plan      Juancarlos Causey is a 47 year old male with PMH of paroxysmal atrial fibrillation, HTN, HLD who presented to the ED on 5/20 for evaluation of rectal pain and fever.     #Perirectal abscess  Patient was initially seen at urgent care on 5/17 for perirectal pain and fever which started around 5/15. He did not have any palpable abscess on exam but was given a prescription for Doxycyline for possible tick born illness (reported abdominal wall tick bite 03/2023). Tick borne disease antibodies panel resulted negative on 5/20. He presented to the ED for evaluation on 5/20 reporting worsening rectal pain and ongoing fevers. No history of crohn's disease or colitis. He is not a diabetic. Noted to have temp 101.7 F, HR 96. Labs with WBC 13.4, CRP 83, lactic acid 1.2. CT pelvis soft tissue w/ contrast showed a 3.3 cm perirectal abscess with phlegmonous change. Colorectal surgery was consulted and performed an I&D. Patient was given IV Unasyn in the ED. Admitted to ED observation for continued IV antibiotics and monitoring.No events overnight. Patient was seen by CRS this morning who recommended cover with guaze daily and okay to discontinue packing, daily sitz baths for comfort, antibiotics for 7 days and follow up with CRS in clinic. They will schedule this. WBC improving this morning and patient is afebrile. Anticipate discharge this afternoon if tolerating PO.   -Appreciate colorectal consult  -Continue IV Unasyn  -APAP or Ibuprofen as needed  -Follow blood cultures     #Paroxysmal atrial fibrillation  -Continue PTA Aspirin       Diet: Regular Diet Adult    DVT Prophylaxis: Low Risk/Ambulatory with no VTE prophylaxis indicated  Esparza Catheter: Not present  Lines: None     Cardiac Monitoring: None  Code Status: Full Code      Clinically Significant Risk Factors  "Present on Admission                # Drug Induced Platelet Defect: home medication list includes an antiplatelet medication        # Overweight: Estimated body mass index is 29.03 kg/m  as calculated from the following:    Height as of this encounter: 1.854 m (6' 1\").    Weight as of this encounter: 99.8 kg (220 lb).            Disposition Plan      Expected Discharge Date: 05/22/2023                The patient's care was discussed with the Attending Physician, Dr. Contreras, Bedside Nurse, Patient and CRS Team.    LINDSEY Tripp CNP  ED Observation   [facility]  Securely message with [employer] (more info)  Text page via [facility] Paging/Directory   ______________________________________________________________________    Interval History   No events overnight. Patient was seen by CRS this morning who recommended cover with guaze daily and okay to discontinue packing, daily sitz baths for comfort, antibiotics for 7 days and follow up with CRS in clinic. They will schedule this. WBC improving this morning and patient is afebrile. Anticipate discharge this afternoon if tolerating PO.     Physical Exam   Vital Signs: Temp: 98  F (36.7  C) Temp src: Oral BP: 130/80 Pulse: 61   Resp: 18 SpO2: 97 % O2 Device: None (Room air)    Weight: 220 lbs 0 oz    Constitutional: alert, no distress, and cooperative  HEENT: normocephalic, atraumatic  Respiratory: respirations unlabored  Musculoskeletal: normal muscle tone  Skin: dressing intact to right perirectal area, 1cm cruciate incision to the L-gluteus, I&D site continues to be draining blood, Minimal pus, Skin remains indurated however no obvious further areas of fluctuance  Neurologic: oriented x 3, moves all extremities, no slurred speech  Psychiatric: normal affect and mood      Medical Decision Making       30 MINUTES SPENT BY ME on the date of service doing chart review, history, exam, documentation & further activities " per the note.      Data   ------------------------- PAST 24 HR DATA REVIEWED -----------------------------------------------    I have personally reviewed the following data over the past 24 hrs:    12.3 (H)  \   13.4   / 233     139 104 8.5 /  109 (H)   3.7 25 0.67 \       ALT: 20 AST: 20 AP: 70 TBILI: 0.4   ALB: 3.6 TOT PROTEIN: 6.7 LIPASE: N/A       Procal: N/A CRP: 83.80 (H) Lactic Acid: 1.2         Imaging results reviewed over the past 24 hrs:   Recent Results (from the past 24 hour(s))   CT Pelvis Soft Tissue w Contrast    Narrative    EXAM: CT PELVIS SOFT TISSUE W CONTRAST 5/20/2023 7:53 PM    HISTORY: 47 years Male rectal abscess    COMPARISON: None.    TECHNIQUE: Axial CT images from the pelvis through the mid thighs were  obtained with IV contrast. Coronal and sagittal reformats provided.  Contrast dose: iopamidol (ISOVUE-370) solution 135 mL.    FINDINGS:  Pelvis: Adjacent to the right posterior lateral margins of the rectum  there is a hypoattenuating fluid collections within the soft tissues  of the right gluteus with rim enhancement, measuring approximately 3.3  x 3.0 x 3.0 cm. There is surrounding soft tissue stranding. No  subcutaneous gas or fistulous formation is seen.     GASTROINTESTINAL: The visualized small bowel is normal in caliber  without abnormal wall thickening. Mild wall thickening is seen along  the right posterior lateral margins of the rectum. No portal venous  gas or pneumatosis. No appendicitis. Colonic diverticulosis without  diverticulitis.    MESENTERY/PERITONEUM: No ascites or pneumoperitoneum.    LYMPH NODES: No lymphadenopathy.    VASCULAR: Visualized major abdominal vessels appear patent.    Bones: No acute or suspicious osseous abnormality. Degenerative  changes of the lumbar spine.      Impression    IMPRESSION:   Hypoattenuating 3.3 cm fluid collection within the soft tissues of the  right gluteus with surrounding soft tissue inflammation, consistent  with perirectal  abscess with phlegmonous change. No subcutaneous gas  or fistulous formation is seen.    I have personally reviewed the examination and initial interpretation  and I agree with the findings.    MAU SANFORD MD         SYSTEM ID:  [id number]

## 2023-05-21 NOTE — PLAN OF CARE
OBSERVATION GOALS:    -diagnostic tests and consults completed and resulted - Met   -vital signs normal or at patient baseline - Met  -adequate pain control on oral analgesics - Met  -infection is improving - In progress    Nurse to notify provider when observation goals have been met and patient is ready for discharge.

## 2023-05-21 NOTE — DISCHARGE SUMMARY
"[facility]  ED Observation Discharge Summary      Date of Admission:  5/20/2023  Date of Discharge:  5/21/2023  Discharging Provider: LINDSEY Tripp CNP  Discharge Service: ED Observation    Discharge Diagnoses   Perirectal Abscess    Clinically Significant Risk Factors     # Overweight: Estimated body mass index is 29.03 kg/m  as calculated from the following:    Height as of this encounter: 1.854 m (6' 1\").    Weight as of this encounter: 99.8 kg (220 lb).       Follow-ups Needed After Discharge   Follow-up Appointments     Adult [facility] Follow-up and recommended labs and tests      Please follow-up in colorectal clinic. You will receive a call to   schedule the appointment during the weekday.         Follow Up and recommended labs and tests      Follow up with CRS in clinic. They will call you to schedule.             Unresulted Labs Ordered in the Past 30 Days of this Admission     Date and Time Order Name Status Description    5/20/2023 10:39 PM Blood Culture Peripheral Blood Preliminary     5/20/2023 10:39 PM Blood Culture Peripheral Blood Preliminary       These results will be followed up by CRS    Discharge Disposition   Discharged to home  Condition at discharge: Stable    Hospital Course      Juancarlos Causey is a 47 year old male with PMH of paroxysmal atrial fibrillation, HTN, HLD who presented to the ED on 5/20 for evaluation of rectal pain and fever.     #Perirectal abscess  Patient was initially seen at urgent care on 5/17 for perirectal pain and fever which started around 5/15. He did not have any palpable abscess on exam but was given a prescription for Doxycyline for possible tick born illness (reported abdominal wall tick bite 03/2023). Tick borne disease antibodies panel resulted negative on 5/20. He presented to the ED for evaluation on 5/20 reporting worsening rectal pain and ongoing fevers. No history of crohn's disease or " colitis. He is not a diabetic. Noted to have temp 101.7 F, HR 96. Labs with WBC 13.4, CRP 83, lactic acid 1.2. CT pelvis soft tissue w/ contrast showed a 3.3 cm perirectal abscess with phlegmonous change. Colorectal surgery was consulted and performed an I&D. Patient was given IV Unasyn in the ED. Admitted to ED observation for continued IV antibiotics and monitoring.No events overnight. Patient was seen by CRS this morning who recommended cover with guaze daily and okay to discontinue packing, daily sitz baths for comfort, antibiotics for 7 days and follow up with CRS in clinic. They will schedule this. WBC improving this morning and patient is afebrile. Patient discharged with Augmentin BID for 7 days, follow up with CRS in clinic. They will call you to schedule. Tylenol and Ibuprofen for pain and fevers. Dressing supplies sent with patient. At the time of discharge, labs are improving, afebrile, tolerating oral intake, and VSS     #Paroxysmal atrial fibrillation  -Continue PTA Aspirin  Consultations This Hospital Stay   None    Code Status   Full Code    Time Spent on this Encounter   ILady APRN CNP, personally saw the patient today and spent less than or equal to 30 minutes discharging this patient.       LINDSEY Tripp CNP  [facility] EMERGENCY DEPARTMENT  [address] [address]  Phone: [phone]  ______________________________________________________________________    Physical Exam   Vital Signs: Temp: 98  F (36.7  C) Temp src: Oral BP: 130/80 Pulse: 61   Resp: 18 SpO2: 97 % O2 Device: None (Room air)    Weight: 220 lbs 0 oz    Constitutional: alert, no distress, and cooperative  HEENT: normocephalic, atraumatic  Respiratory: respirations unlabored  Musculoskeletal: normal muscle tone  Skin: dressing intact to right perirectal area, 1cm cruciate incision to the L-gluteus, I&D site continues to be draining blood, Minimal pus, Skin remains  indurated however no obvious further areas of fluctuance  Neurologic: oriented x 3, moves all extremities, no slurred speech  Psychiatric: normal affect and mood          Primary Care Physician   Shyam Pierce    Discharge Orders      Adult [facility] Follow-up and recommended labs and tests    Please follow-up in colorectal clinic. You will receive a call to schedule the appointment during the weekday.     Wound care and dressings    Instructions to care for your wound at home:  Soak perianal area for 5-10 min in 5 inches of warm water in tub or spray warm water directly on perianal area with microphone shower head. Replace dressing daily and as needed. No soaking perianal area for than 20 min at a time.    No need to pack the wound. You may place a small piece of gauze in between the buttocks in order to catch any drainage as necessary.     Reason for your hospital stay    Perirectal Abscess     Activity    Your activity upon discharge: activity as tolerated     Follow Up and recommended labs and tests    Follow up with CRS in clinic. They will call you to schedule.     When to contact your care team    Return to the ED with fever, uncontrolled nausea, vomiting, unrelieved pain, bleeding not relieved with pressure, dizziness, chest pain, shortness of breath, loss of consciousness, and any new or concerning symptoms.     Discharge Instructions    You were admitted to ED observation for evaluation of a perirectal abscess. Colorectal surgery was consulted and drained the abscess. Today, the recommended daily wound care. The instructions are posted in this discharge summary. Additionally they recommended oral antibiotics and follow up in clinic. You should take Augmentin twice a day for the next seven days and follow up in the CRS clinic. They will call you to schedule. Keep the area clean and cry and covered.     Diet    Follow this diet upon discharge: Regular       Significant Results and Procedures   Most  Recent 3 CBC's:Recent Labs   Lab Test 05/21/23  0549 05/20/23  1701 12/26/19  0837   WBC 12.3* 13.4* 5.0   HGB 13.4 14.6 17.0   MCV 92 91 90    252 244     Most Recent 3 BMP's:Recent Labs   Lab Test 05/21/23  0549 05/20/23  1701 12/31/19  1420 12/26/19  0837    137  --  139   POTASSIUM 3.7 3.7  --  4.5   CHLORIDE 104 98  --  105   CO2 25 24  --  26   BUN 8.5 13.0  --  10   CR 0.67 0.84  --  0.77   ANIONGAP 10 15  --  8   ARPITA 8.7 9.4  --  9.9   * 123* 188* 65*     Most Recent 2 LFT's:Recent Labs   Lab Test 05/21/23  0549 11/25/19  0908   AST 20 27   ALT 20 29   ALKPHOS 70 54   BILITOTAL 0.4 0.7     Most Recent 3 INR's:No lab results found.  Most Recent INR's and Anticoagulation Dosing History:  Anticoagulation Dose History          View : No data to display.                    Most Recent 3 Creatinines:Recent Labs   Lab Test 05/21/23  0549 05/20/23  1701 12/26/19  0837   CR 0.67 0.84 0.77     Most Recent 3 Hemoglobins:Recent Labs   Lab Test 05/21/23  0549 05/20/23  1701 12/26/19  0837   HGB 13.4 14.6 17.0     Most Recent 3 Troponin's:No lab results found.  Most Recent 3 BNP's:No lab results found.  Most Recent D-dimer:No lab results found.  Most Recent Cholesterol Panel:Recent Labs   Lab Test 06/11/21  0832   CHOL 266.0*   .0*   HDL 54.0   TRIG 77.0     7-Day Micro Results     Collected Updated Procedure Result Status      05/21/2023 0004 05/21/2023 1232 Blood Culture Peripheral Blood [[id number]]   Peripheral Blood    Preliminary result Component Value   Culture No growth after 12 hours  [P]                05/21/2023 0004 05/21/2023 1232 Blood Culture Peripheral Blood [[id number]]   Peripheral Blood    Preliminary result Component Value   Culture No growth after 12 hours  [P]                05/17/2023 1419 05/20/2023 1354 COVID-19 VIRUS (CORONAVIRUS) BY PCR (EXTERNAL RESULT) [[id number]]    Swab   Specimen type and source: Swa...    Final result Component Value   COVID-19 Virus by  PCR (External Result) Negative   SARS-CoV-2 (COVID-19) target nucleic acids are not detected.  COVID-19 can not be completely ruled out as sensitivity of the test depends on the timing of specimen collection and quality of the specimen.                Most Recent TSH and T4:Recent Labs   Lab Test 04/23/19  1006   TSH 1.42     Most Recent Hemoglobin A1c:Recent Labs   Lab Test 06/11/21  0832   A1C 5.5     Most Recent 6 glucoses:Recent Labs   Lab Test 05/21/23  0549 05/20/23  1701 12/31/19  1420 12/26/19  0837 11/25/19  0908 04/23/19  1006   * 123* 188* 65* 93 96   ,   Results for orders placed or performed during the hospital encounter of 05/20/23   CT Pelvis Soft Tissue w Contrast    Narrative    EXAM: CT PELVIS SOFT TISSUE W CONTRAST 5/20/2023 7:53 PM    HISTORY: 47 years Male rectal abscess    COMPARISON: None.    TECHNIQUE: Axial CT images from the pelvis through the mid thighs were  obtained with IV contrast. Coronal and sagittal reformats provided.  Contrast dose: iopamidol (ISOVUE-370) solution 135 mL.    FINDINGS:  Pelvis: Adjacent to the right posterior lateral margins of the rectum  there is a hypoattenuating fluid collections within the soft tissues  of the right gluteus with rim enhancement, measuring approximately 3.3  x 3.0 x 3.0 cm. There is surrounding soft tissue stranding. No  subcutaneous gas or fistulous formation is seen.     GASTROINTESTINAL: The visualized small bowel is normal in caliber  without abnormal wall thickening. Mild wall thickening is seen along  the right posterior lateral margins of the rectum. No portal venous  gas or pneumatosis. No appendicitis. Colonic diverticulosis without  diverticulitis.    MESENTERY/PERITONEUM: No ascites or pneumoperitoneum.    LYMPH NODES: No lymphadenopathy.    VASCULAR: Visualized major abdominal vessels appear patent.    Bones: No acute or suspicious osseous abnormality. Degenerative  changes of the lumbar spine.      Impression    IMPRESSION:    Hypoattenuating 3.3 cm fluid collection within the soft tissues of the  right gluteus with surrounding soft tissue inflammation, consistent  with perirectal abscess with phlegmonous change. No subcutaneous gas  or fistulous formation is seen.    I have personally reviewed the examination and initial interpretation  and I agree with the findings.    MAU SANFORD MD         SYSTEM ID:  [id number]       Discharge Medications   Current Discharge Medication List      START taking these medications    Details   acetaminophen (TYLENOL) 325 MG tablet Take 3 tablets (975 mg) by mouth every 6 hours as needed for mild pain or other (and adjunct with moderate or severe pain or per patient request)    Associated Diagnoses: Perirectal abscess      amoxicillin-clavulanate (AUGMENTIN) 875-125 MG tablet Take 1 tablet by mouth 2 times daily for 7 days  Qty: 14 tablet, Refills: 0    Associated Diagnoses: Perirectal abscess         CONTINUE these medications which have NOT CHANGED    Details   aspirin (ASA) 81 MG chewable tablet Take 1 tablet (81 mg) by mouth daily  Qty: 90 tablet, Refills: 3    Associated Diagnoses: PAF (paroxysmal atrial fibrillation) (H)           Allergies   No Known Allergies

## 2023-05-21 NOTE — PROCEDURES
[facility]    Procedure: Incision and drainage, perirectal abscess    Date/Time: 5/21/2023 1:57 AM    Performed by: Yusef Lopez MD  Authorized by: Yusef Lopez MD    Risks, benefits and alternatives discussed.      UNIVERSAL PROTOCOL   Site Marked: No  Prior Images Obtained and Reviewed:  Yes     ANESTHESIA    Anesthesia: Local infiltration  Local Anesthetic:  Lidocaine 1% with epinephrine  Anesthetic Total (mL):  10      PROCEDURE  Describe Procedure: [facility]  Procedure Note     Pre-operative diagnosis: Abscess of anal and rectal regions (K61.2)  Post-operative diagnosis: Abscess of right buttock  Procedure:      Procedure(s): Incision and drainage, right buttock    Surgeon:         Surgeon(s) and Role:   * Yusef Lopez MD - Resident - Primary  Anesthesia:    Local  Estimated Blood Loss: 5cc  Drains: None  Specimens:     * No specimens in log *  Findings:                     None.  Complications:            None.  Implants:         * No implants in log *     PROCEDURE DETAILS:    Patient was informed of the risks/benefits/and alternatives of the procedure and consent was obtained. The patient was positioned appropriately in the lateral position with right buttock exposure and area was cleaned and prepped with Betadine. 10cc 0.25% lidocaine with epinephrine was injected as a local anesthetic at the deep edges of the wound. A single stab 1-cm incision was made with a #15 scalpel with 12cc of sanguinopurulent pus drained and expressed with gentle break-up of loculations. The wound was explored with no notable tracking noted on examination. Wound was approximately 1cm x 1cm in a cruciate incision, resulting in an open incision measuring 1-cm x 1-cm in length to prevent closure of the wound. The wound was irrigated with saline thoroughly, then packed with a 1/4 inch gauze. Procedure tolerated without  complications. Wound dressed with a Primapore dressing. Total blood loss was 5cc. Patient noted relief of pressure and reduction in pain level.     Findings and procedure discussed with CRS fellow Dr. Reardon.     Yusef Lopez MD  Surgery PGY-3  Patient Tolerance:  Patient tolerated the procedure well with no immediate complications

## 2023-05-21 NOTE — PROGRESS NOTES
Colorectal Surgery: Brief progress note    Subjective: NAEON. Still having some pain to the buttocks since bedside I&D however feeling much better.     Objective:   Vitals- Reviewed  NAD, AAOx3  RRR on tele  NLB on RA  Anorectal - 1cm cruciate incision to the L-gluteus, I&D site continues to be draining blood, Minimal pus, Skin remains indurated however no obvious further areas of fluctuance      Assessment/Plan: Juancarlos Causey is a 47 year old male with PMH of PAH, HTN, HLD, atrial flutter s/p ablation and PSH of laparoscopic inguinal hernia repair who presents to the ED on 5/20/2023 with fever and tachycardia concerning for sepsis, with ongoing perirectal pain over the last week, with imaging notable for right sided perirectal abscess . CT imaging showed a hypoattenuating 3.3 cm fluid collection within the soft tissues of the right gluteus with surrounding soft tissue inflammation, consistent with perirectal abscess with phlegmonous change. Now s/p bedside I&D. Recovering well.     - Ok to discontinue packing, Cover with gauze daily PRN  - Recommend daily sitz baths for comfort  - Recommend 7-day course of PO antibiotics on discharge  - Will place referral for follow-up in CRS clinic this week; Anticipate that patient will continue to have some ongoing induration of the surrounding gluteal skin that will continue to improve with antibiotics. Patient was advised on follow-up and he is agreeable to this plan.   - Ok to discharge per primary; Discharge instructions placed in AVS    Patient seen and discussed with CRS fellow, Dr. Caron Diana MD  General Surgery, PGY-1'

## 2023-05-21 NOTE — H&P
"[facility]    History and Physical - ED Observation Service       Date of Admission:  5/20/2023    Assessment & Plan    Juancarlos Causey is a 47 year old male with PMH of paroxysmal atrial fibrillation, HTN, HLD who presented to the ED on 5/20 for evaluation of rectal pain and fever.    #Perirectal abscess  Patient was initially seen at urgent care on 5/17 for perirectal pain and fever which started around 5/15. He did not have any palpable abscess on exam but was given a prescription for Doxycyline for possible tick born illness (reported abdominal wall tick bite 03/2023). Tick borne disease antibodies panel resulted negative on 5/20. He presented to the ED for evaluation on 5/20 reporting worsening rectal pain and ongoing fevers. No history of crohn's disease or colitis. He is not a diabetic. Noted to have temp 101.7 F, HR 96. Labs with WBC 13.4, CRP 83, lactic acid 1.2. CT pelvis soft tissue w/ contrast showed a 3.3 cm perirectal abscess with phlegmonous change. Colorectal surgery was consulted and performed an I&D. Patient was given IV Unasyn in the ED. Admitted to ED observation for continued IV antibiotics and monitoring.  -Appreciate colorectal consult  -Continue IV Unasyn  -APAP or Ibuprofen as needed  -Follow blood cultures    #Paroxysmal atrial fibrillation  -Continue PTA Aspirin     Diet: Regular Diet Adult  DVT Prophylaxis: Low Risk/Ambulatory with no VTE prophylaxis indicated and Ambulate every shift  Esparza Catheter: Not present  Lines: None     Cardiac Monitoring: None  Code Status: Full Code    Clinically Significant Risk Factors Present on Admission                # Drug Induced Platelet Defect: home medication list includes an antiplatelet medication        # Overweight: Estimated body mass index is 29.03 kg/m  as calculated from the following:    Height as of this encounter: 1.854 m (6' 1\").    Weight as of this encounter: 99.8 kg (220 lb).        " "    Disposition Plan      Expected Discharge Date: 05/22/2023                The patient's care was discussed with the Patient and ED provider.    Kyra Deleon PA-C  ED Observation Service  [facility]  Securely message with [employer] (more info)  Text page via [facility] Paging/Directory     ______________________________________________________________________    Chief Complaint   Perirectal pain, fever    History is obtained from the patient    History of Present Illness   Per ED: \"Juancarlos Causey is a 47 year old male who presents to the emergency department with complaints of rectal pain that he has had over the last week.  Patient states that he was started on some doxycycline in an urgent care a few days ago because of possible infection, as well as a recent tick bite that he has had.  Patient states however that his rectal pain has worsened, and he now presents here for evaluation. The patient states that he is developed some fevers, but has no history of diabetes, has no previous history of Crohn's disease, or colitis, and has no other risk factors.  The patient complains of rectal pain.\"    Past Medical History    Past Medical History:   Diagnosis Date     Atrial fibrillation (H)      Dyslipidemia      Hyperlipidemia      Paroxysmal atrial fibrillation (H)        Past Surgical History   Past Surgical History:   Procedure Laterality Date     CARDIOVERSION  11/12/2019     EP ABLATION N/A 12/31/2019     EP ABLATION AFLUTTER  12/31/2019    Procedure: EP Ablation Atrial Flutter;  Surgeon: Kal Hoover MD;  Location: [facility] Cath Lab;  Service: Cardiology     EP ABLATION PVI Left 12/31/2019    Procedure: EP Ablation PVI;  Surgeon: Kal Hoover MD;  Location: [facility] Cath Lab;  Service: Cardiology     HC LAP,INGUINAL HERNIA REPR,INITIAL Bilateral     8 months     HC TOOTH EXTRACTION W/FORCEP       HERNIA REPAIR Bilateral        Prior to Admission " Medications   Prior to Admission Medications   Prescriptions Last Dose Informant Patient Reported? Taking?   aspirin (ASA) 81 MG chewable tablet   No No   Sig: Take 1 tablet (81 mg) by mouth daily      Facility-Administered Medications: None        Review of Systems    The 10 point Review of Systems is negative other than noted in the HPI or here.      Physical Exam   Vital Signs: Temp: 100.3  F (37.9  C) Temp src: Oral BP: (!) 140/79 Pulse: 96   Resp: 18 SpO2: 97 %      Weight: 220 lbs 0 oz  Exam:  Constitutional: alert, no distress, and cooperative  HEENT: normocephalic, atraumatic  Respiratory: respirations unlabored  Musculoskeletal: normal muscle tone  Skin: dressing intact to right perirectal area  Neurologic: oriented x 3, moves all extremities, no slurred speech  Psychiatric: normal affect and mood    Medical Decision Making             Data     I have personally reviewed the following data over the past 24 hrs:    13.4 (H)  \   14.6   / 252     137 98 13.0 /  123 (H)   3.7 24 0.84 \       Procal: N/A CRP: 83.80 (H) Lactic Acid: 1.2         Imaging results reviewed over the past 24 hrs:   Recent Results (from the past 24 hour(s))   CT Pelvis Soft Tissue w Contrast    Narrative    EXAM: CT PELVIS SOFT TISSUE W CONTRAST 5/20/2023 7:53 PM    HISTORY: 47 years Male rectal abscess    COMPARISON: None.    TECHNIQUE: Axial CT images from the pelvis through the mid thighs were  obtained with IV contrast. Coronal and sagittal reformats provided.  Contrast dose: iopamidol (ISOVUE-370) solution 135 mL.    FINDINGS:  Pelvis: Adjacent to the right posterior lateral margins of the rectum  there is a hypoattenuating fluid collections within the soft tissues  of the right gluteus with rim enhancement, measuring approximately 3.3  x 3.0 x 3.0 cm. There is surrounding soft tissue stranding. No  subcutaneous gas or fistulous formation is seen.     GASTROINTESTINAL: The visualized small bowel is normal in caliber  without  abnormal wall thickening. Mild wall thickening is seen along  the right posterior lateral margins of the rectum. No portal venous  gas or pneumatosis. No appendicitis. Colonic diverticulosis without  diverticulitis.    MESENTERY/PERITONEUM: No ascites or pneumoperitoneum.    Reproductive: Testicular calcifications.    LYMPH NODES: No lymphadenopathy.    VASCULAR: Visualized major abdominal vessels appear patent.    Bones: No acute or suspicious osseous abnormality. Degenerative  changes of the lumbar spine.      Impression    IMPRESSION:   Hypoattenuating 3.3 cm fluid collection within the soft tissues of the  right gluteus with surrounding soft tissue inflammation, consistent  with perirectal abscess with phlegmonous change. No subcutaneous gas  or fistulous formation is seen.

## 2023-05-23 ENCOUNTER — TELEPHONE (OUTPATIENT)
Dept: SURGERY | Facility: CLINIC | Age: 48
End: 2023-05-23
Payer: COMMERCIAL

## 2023-05-23 NOTE — TELEPHONE ENCOUNTER
Juancarlos is s/p I&D for perianal abscess on 5/20 in the ED. He reports minimal pain and drainage coming from I&D site. He will continue to do sitz baths and take tylenol as needed for the pain.     He is scheduled for a follow up with FRANKLYN Sewell on 5/31/2023.     Patient has our number if any concerns arise.       Tosin Dennis, CMA

## 2023-05-23 NOTE — TELEPHONE ENCOUNTER
[facility] Call Center    Phone Message    May a detailed message be left on voicemail: yes     Reason for Call: Other: Patient's spouse, Lizzette, called as patient was in ER on 5/20/23 and had a maryann-rectal abscess drained.  Patient was told to follow up with Colon and Rectal in one week but, next available is August 2023.  Please follow up with patient.    Action Taken: Message routed to:  [facility] ([facility]    Travel Screening: Not Applicable

## 2023-05-24 ENCOUNTER — PATIENT OUTREACH (OUTPATIENT)
Dept: SURGERY | Facility: CLINIC | Age: 48
End: 2023-05-24
Payer: COMMERCIAL

## 2023-05-24 ENCOUNTER — NURSE TRIAGE (OUTPATIENT)
Dept: NURSING | Facility: CLINIC | Age: 48
End: 2023-05-24
Payer: COMMERCIAL

## 2023-05-24 NOTE — TELEPHONE ENCOUNTER
"Patient called with concerns about increased drainage from his incision site. Pt was treated for a rectal abscess on 5/20/23 and was having small amount of clear drainage. Then on 5/23/23 his drainage increased in amount and changed to thick brown type. Pt states that the drainage soaks through gauze and through his underwear. Pt states that he feels the increase in drainage may be due to having driven a longer distance on Saturday. Pt is wondering if he needs to have the incision site evaluated, or if this is normal after having an abscess drained. No fever, redness or bleeding.        Reason for Disposition    Dressing soaked with blood or body fluid (e.g., drainage)    Additional Information    Negative: Major abdominal surgical incision and wound gaping open with visible internal organs    Negative: Sounds like a life-threatening emergency to the triager    Negative: Bleeding from incision and won't stop after 10 minutes of direct pressure    Negative: Bleeding (more than a few drops) from incision and after blood vessel surgery (e.g., carotidendarterectomy, femoral bypass graft, kidney dialysis fistula, tracheostomy)    Negative: Bright red, wide-spread, sunburn-like rash    Negative: SEVERE pain in the incision    Negative: Incision gaping open and < 2 days (48 hours) since wound re-opened    Negative: Incision gaping open and length of opening > 2 inches (5 cm)    Negative: Patient sounds very sick or weak to the triager    Negative: Sounds like a serious complication to the triager    Negative: Fever > 100.4 F (38.0 C)    Negative: Incision looks infected (spreading redness, pain)    Negative: Red streak runs from the incision and longer than 1 inch (2.5 cm)    Negative: Pus or bad-smelling fluid draining from incision    Answer Assessment - Initial Assessment Questions  1. SYMPTOM: \"What's the main symptom you're concerned about?\" (e.g., drainage, incision opening up, pain, redness)      Increased drainage, " "brown in color and thicker    2. ONSET: \"When did increased drainage start?\"      5/23/23    3. SURGERY: \"What surgery did you have?\"      Drainage of maryann-rectal abscess on 5/20/23    4. DATE of SURGERY: \"When was the surgery?\"       5/20/23    5. INCISION SITE: \"Where is the incision located?\"       Perineal    6. REDNESS: \"Is there any redness at the incision site?\" If yes, ask: \"How wide across is the redness?\" (Inches, centimeters)       No redness    7. PAIN: \"Is there any pain?\" If Yes, ask: \"How bad is it?\"  (Scale 1-10; or mild, moderate, severe)    - NONE (0): no pain    - MILD (1-3): doesn't interfere with normal activities     - MODERATE (4-7): interferes with normal activities or awakens from sleep     - SEVERE (8-10): excruciating pain, unable to do any normal activities            3/10, tender    8. BLEEDING: \"Is there any bleeding?\" If Yes, ask: \"How much?\" and \"Where?\"      None    9. DRAINAGE: \"Is there any drainage from the incision site?\" If yes, ask: \"What color and how much?\" (e.g., red, cloudy, pus; drops, teaspoon)      Moderate amount starting on Saturday    10. FEVER: \"Do you have a fever?\" If Yes, ask: \"What is your temperature, how was it measured, and when did it start?\"        None    11. OTHER SYMPTOMS: \"Do you have any other symptoms?\" (e.g., dizziness, rash elsewhere on body, shaking chills, weakness)        none    Protocols used: POST-OP INCISION SYMPTOMS AND [id number]      "

## 2023-05-24 NOTE — CONFIDENTIAL NOTE
Pt is s/p perianal abscess incision and drainage. Left VM to discuss how he is doing and set up follow up appointment, per Sree Garcia MD , he might not want to follow up as he lives a ways away. Left callback number.

## 2023-05-26 LAB
BACTERIA BLD CULT: NO GROWTH
BACTERIA BLD CULT: NO GROWTH

## 2023-05-31 ENCOUNTER — OFFICE VISIT (OUTPATIENT)
Dept: SURGERY | Facility: CLINIC | Age: 48
End: 2023-05-31
Payer: COMMERCIAL

## 2023-05-31 VITALS
SYSTOLIC BLOOD PRESSURE: 137 MMHG | DIASTOLIC BLOOD PRESSURE: 83 MMHG | HEIGHT: 73 IN | OXYGEN SATURATION: 99 % | BODY MASS INDEX: 29.16 KG/M2 | HEART RATE: 65 BPM | WEIGHT: 220 LBS | TEMPERATURE: 98.1 F

## 2023-05-31 DIAGNOSIS — K61.0 PERIANAL ABSCESS: Primary | ICD-10-CM

## 2023-05-31 PROCEDURE — 99202 OFFICE O/P NEW SF 15 MIN: CPT | Performed by: NURSE PRACTITIONER

## 2023-05-31 ASSESSMENT — PAIN SCALES - GENERAL: PAINLEVEL: MILD PAIN (2)

## 2023-05-31 NOTE — NURSING NOTE
"Chief Complaint   Patient presents with     RECHECK     S/p ED I&D 5/20       Vitals:    05/31/23 1143   BP: 137/83   BP Location: Left arm   Patient Position: Sitting   Cuff Size: Adult Regular   Pulse: 65   SpO2: 99%   Weight: 220 lb   Height: 6' 1\"       Body mass index is 29.03 kg/m .     Arpan Campbell, EMT- P    "

## 2023-05-31 NOTE — PROGRESS NOTES
"Colon and Rectal Surgery Follow-Up Clinic Note    RE: Juancarlos Causey  : 1975  BOBBY: 2025    Juancarlos Causey is a very pleasant 47 year old male here for follow up of perianal abscess.    Interval history: Juancarlos developed a perianal abscess with I&D in the ER by our colorectal team on 23. He was sent home on antibiotics and a few days later had another pocket that drained on its own.  He has been feeling progressively better.  No significant pain.  Drainage has almost completely stopped.  No fevers or chills.  He has never had anything like this in the past.  He does report that he thinks that his weight and hygiene may contribute to this problem he plans on improving those things.  He had a colonoscopy in 2019 with a few adenomatous polyps and reportedly had a repeat colonoscopy last year with a 5-year recall recommended.    Physical Examination: Exam was chaperoned by Arpan Campbell, EMT-P   /83 (BP Location: Left arm, Patient Position: Sitting, Cuff Size: Adult Regular)   Pulse 65   Temp 98.1  F (36.7  C) (Oral)   Ht 6' 1\"   Wt 220 lb   SpO2 99%   BMI 29.03 kg/m    General: alert, oriented, in no acute distress, sitting comfortalby  HEENT: mucous membranes moist  Perianal external examination:  I&D site in the left lateral position almost completely healed.  No erythema or induration.  No fluctuance.    Digital rectal examination: Was deferred.    Anoscopy: Was deferred.    Assessment/Plan: 47 year old male with perianal abscess s/p I&D in the ER on .  Site has almost completely healed and symptoms have resolved.  He has never had anything like this in the past we did discuss the possibility of an anal fistula.  We will have him return if he has any symptoms after 3 to 4 weeks or any recurrent abscess at any time. Patient's questions were answered to his stated satisfaction and he is in agreement with this plan.     Medical history:  Past Medical History:   Diagnosis " Date     Atrial fibrillation (H)      Dyslipidemia      Hyperlipidemia      Paroxysmal atrial fibrillation (H)        Surgical history:  Past Surgical History:   Procedure Laterality Date     CARDIOVERSION  11/12/2019     EP ABLATION N/A 12/31/2019     EP ABLATION AFLUTTER  12/31/2019    Procedure: EP Ablation Atrial Flutter;  Surgeon: Kal Hoover MD;  Location: [facility];  Service: Cardiology     EP ABLATION PVI Left 12/31/2019    Procedure: EP Ablation PVI;  Surgeon: Kal Hoover MD;  Location: [facility];  Service: Cardiology     HC LAP,INGUINAL HERNIA REPR,INITIAL Bilateral     8 months     HC TOOTH EXTRACTION W/FORCEP       HERNIA REPAIR Bilateral        Problem list:  Patient Active Problem List    Diagnosis Date Noted     Perirectal abscess 05/21/2023     Priority: Medium     Dyslipidemia 01/28/2020     Priority: Medium     - 1/20/17 Chol 272, TGY 74, HDL 53,   - 4/23/19 Chol 251, TGY 81, HDL 57,    - was on Rosuvastatin 20mg then 10mg for about 3 weeks starting in November 2019  - tolerated well during this time  - 11/25/19 Chol 143, TGY 62, HDL 43, LDL 88     - stopped Rosuvastatin in mid-December 2019  - 2/3/20 Fasting lipids: Chol 201, TGY 97, HDL 53,   - decided to stay off of rosuvastatin at that time     - 11/25/19 Lipo(a) 17mg/dl (ref range <=29mg/dl)  - 1/20/17 hsCRP 0.4  - 11/25/19 hsCRP 0.7   - 4/23/19 A1c 5.4%       PAF (paroxysmal atrial fibrillation) (H) 12/02/2019     Priority: Medium     - s/p Ablation 12/31/19         Family history of colon cancer in mother 01/20/2017     Priority: Medium     Herniated vertebral disc 09/23/2008     Priority: Medium       Medications:  Current Outpatient Medications   Medication Sig Dispense Refill     acetaminophen (TYLENOL) 325 MG tablet Take 3 tablets (975 mg) by mouth every 6 hours as needed for mild pain or other (and adjunct with moderate or severe pain or per patient request)       aspirin (ASA) 81 MG  "chewable tablet Take 1 tablet (81 mg) by mouth daily 90 tablet 3       Allergies:  No Known Allergies    Family history:  Family History   Problem Relation Age of Onset     Colon Cancer Mother      Breast Cancer Mother      Atrial fibrillation Father      Urinary tract infection Father         recurrent     Diabetes Type 2  Father      Hyperlipidemia Sister      No Known Problems Brother      Hyperlipidemia Maternal Grandmother      No Known Problems Sister      Prostate Cancer No family hx of      Diabetes Father      Arthritis Maternal Grandmother        Social history:  Social History     Tobacco Use     Smoking status: Never     Smokeless tobacco: Never   Vaping Use     Vaping status: Not on file   Substance Use Topics     Alcohol use: Yes     Alcohol/week: 14.0 standard drinks of alcohol     Types: 14 Glasses of wine per week     Comment: Two 4oz glasses of wine a night     Marital status: .  Nursing Notes:   Arpan Campbell, EMT  5/31/2023 11:48 AM  Signed  Chief Complaint   Patient presents with     RECHECK     S/p ED I&D 5/20       Vitals:    05/31/23 1143   BP: 137/83   BP Location: Left arm   Patient Position: Sitting   Cuff Size: Adult Regular   Pulse: 65   SpO2: 99%   Weight: 220 lb   Height: 6' 1\"       Body mass index is 29.03 kg/m .     Arpan Campbell, EMT- P         15 minutes spent on the date of encounter performing chart review, history and exam, documentation and further activities as noted above    FRANKLYN Sewell  Colon and Rectal Surgery  [facility]    "

## 2023-05-31 NOTE — LETTER
"2023       RE: Juancarlos Causey  [address] [place] [address]       Dear Colleague,    Thank you for referring your patient, Juancarlos Causey, to the [facility] [place] [facility]. Please see a copy of my visit note below.    Colon and Rectal Surgery Follow-Up Clinic Note    RE: Juancarlos Causey  : 1975  BOBBY: 2023    Juancarlos Causey is a very pleasant 47 year old male here for follow up of perianal abscess.    Interval history: Juancarlos developed a perianal abscess with I&D in the ER by our colorectal team on 23. He was sent home on antibiotics and a few days later had another pocket that drained on its own.  He has been feeling progressively better.  No significant pain.  Drainage has almost completely stopped.  No fevers or chills.  He has never had anything like this in the past.  He does report that he thinks that his weight and hygiene may contribute to this problem he plans on improving those things.  He had a colonoscopy in 2019 with a few adenomatous polyps and reportedly had a repeat colonoscopy last year with a 5-year recall recommended.    Physical Examination: Exam was chaperoned by Arpan Campbell, EMT-P   /83 (BP Location: Left arm, Patient Position: Sitting, Cuff Size: Adult Regular)   Pulse 65   Temp 98.1  F (36.7  C) (Oral)   Ht 6' 1\"   Wt 220 lb   SpO2 99%   BMI 29.03 kg/m    General: alert, oriented, in no acute distress, sitting comfortalby  HEENT: mucous membranes moist  Perianal external examination:  I&D site in the left lateral position almost completely healed.  No erythema or induration.  No fluctuance.    Digital rectal examination: Was deferred.    Anoscopy: Was deferred.    Assessment/Plan: 47 year old male with perianal abscess s/p I&D in the ER on .  Site has almost completely healed and symptoms have resolved.  He has never had " anything like this in the past we did discuss the possibility of an anal fistula.  We will have him return if he has any symptoms after 3 to 4 weeks or any recurrent abscess at any time. Patient's questions were answered to his stated satisfaction and he is in agreement with this plan.     Medical history:  Past Medical History:   Diagnosis Date    Atrial fibrillation (H)     Dyslipidemia     Hyperlipidemia     Paroxysmal atrial fibrillation (H)        Surgical history:  Past Surgical History:   Procedure Laterality Date    CARDIOVERSION  11/12/2019    EP ABLATION N/A 12/31/2019    EP ABLATION AFLUTTER  12/31/2019    Procedure: EP Ablation Atrial Flutter;  Surgeon: Kal Hoover MD;  Location: [facility] Lab;  Service: Cardiology    EP ABLATION PVI Left 12/31/2019    Procedure: EP Ablation PVI;  Surgeon: Kal Hoover MD;  Location: [facility];  Service: Cardiology    HC LAP,INGUINAL HERNIA REPR,INITIAL Bilateral     8 months    HC TOOTH EXTRACTION W/FORCEP      HERNIA REPAIR Bilateral        Problem list:  Patient Active Problem List    Diagnosis Date Noted    Perirectal abscess 05/21/2023     Priority: Medium    Dyslipidemia 01/28/2020     Priority: Medium     - 1/20/17 Chol 272, TGY 74, HDL 53,   - 4/23/19 Chol 251, TGY 81, HDL 57,    - was on Rosuvastatin 20mg then 10mg for about 3 weeks starting in November 2019  - tolerated well during this time  - 11/25/19 Chol 143, TGY 62, HDL 43, LDL 88     - stopped Rosuvastatin in mid-December 2019  - 2/3/20 Fasting lipids: Chol 201, TGY 97, HDL 53,   - decided to stay off of rosuvastatin at that time     - 11/25/19 Lipo(a) 17mg/dl (ref range <=29mg/dl)  - 1/20/17 hsCRP 0.4  - 11/25/19 hsCRP 0.7   - 4/23/19 A1c 5.4%      PAF (paroxysmal atrial fibrillation) (H) 12/02/2019     Priority: Medium     - s/p Ablation 12/31/19        Family history of colon cancer in mother 01/20/2017     Priority: Medium    Herniated vertebral disc  "09/23/2008     Priority: Medium       Medications:  Current Outpatient Medications   Medication Sig Dispense Refill    acetaminophen (TYLENOL) 325 MG tablet Take 3 tablets (975 mg) by mouth every 6 hours as needed for mild pain or other (and adjunct with moderate or severe pain or per patient request)      aspirin (ASA) 81 MG chewable tablet Take 1 tablet (81 mg) by mouth daily 90 tablet 3       Allergies:  No Known Allergies    Family history:  Family History   Problem Relation Age of Onset    Colon Cancer Mother     Breast Cancer Mother     Atrial fibrillation Father     Urinary tract infection Father         recurrent    Diabetes Type 2  Father     Hyperlipidemia Sister     No Known Problems Brother     Hyperlipidemia Maternal Grandmother     No Known Problems Sister     Prostate Cancer No family hx of     Diabetes Father     Arthritis Maternal Grandmother        Social history:  Social History     Tobacco Use    Smoking status: Never    Smokeless tobacco: Never   Vaping Use    Vaping status: Not on file   Substance Use Topics    Alcohol use: Yes     Alcohol/week: 14.0 standard drinks of alcohol     Types: 14 Glasses of wine per week     Comment: Two 4oz glasses of wine a night     Marital status: .  Nursing Notes:   Arpan Campbell, EMT  5/31/2023 11:48 AM  Signed  Chief Complaint   Patient presents with    RECHECK     S/p ED I&D 5/20       Vitals:    05/31/23 1143   BP: 137/83   BP Location: Left arm   Patient Position: Sitting   Cuff Size: Adult Regular   Pulse: 65   SpO2: 99%   Weight: 220 lb   Height: 6' 1\"       Body mass index is 29.03 kg/m .     Arpan Campbell, EMT- P    15 minutes spent on the date of encounter performing chart review, history and exam, documentation and further activities as noted above    Colonoscopy 2022 with 5 year recall due to history of adenomatous polyps.        Again, thank you for allowing me to participate in the care of your patient.      Sincerely,    Flor Brown" LINDSEY Huertas CNP

## 2023-06-28 NOTE — PROGRESS NOTES
"SUBJECTIVE:   CC: Juancarlos is an 47 year old who presents for preventative health visit.        No data to display              Healthy Habits:     Getting at least 3 servings of Calcium per day:  Yes    Bi-annual eye exam:  Yes    Dental care twice a year:  Yes    Sleep apnea or symptoms of sleep apnea:  None    Diet:  Regular (no restrictions)    Frequency of exercise:  4-5 days/week    Duration of exercise:  Greater than 60 minutes    Taking medications regularly:  Yes    Medication side effects:  Not applicable    Additional concerns today:  No      # Perianal Abscess  - seen in [facility] ED 5/20/23 for rectal pain x1 week, found to have perianal abscess  - I&D performed in ED and he was discharged on Augmentin  - saw colorectal 5/31/23, plan to follow up if still having any symptoms in 3-4 weeks    # Paroxysmal A Fib  - s/p ablation 12/31/19  - has continued to have daily palpitations - once a day, 5-6 beats \"heavy heartbeats\"  - no prolonged periods or sensation that he would consider a fibrillation episode  - previous cardiologist Dr. Hoover felt that they correlated with PACs on previous monitoring  - recommended indefinite aspirin 81mg daily after finishing Eliquis  - at some point he stopped taking the aspirin    - alcohol use has increased with work, up to 3 drinks a day    # Dyslipidemia  - 1/20/17 Chol 272, TGY 74, HDL 53,   - 4/23/19 Chol 251, TGY 81, HDL 57,    - was on Rosuvastatin 20mg then 10mg for about 3 weeks starting in November 2019  - tolerated well during this time  - 11/25/19 Chol 143, TGY 62, HDL 43, LDL 88     - stopped Rosuvastatin in mid-December 2019  - 2/3/20 Fasting lipids: Chol 201, TGY 97, HDL 53,   - decided to stay off of rosuvastatin at that time  - 6/11/21 Fasting Chol 266, TGY 77, HDL 54, , A1c 5.5%  - I recommended restarting a statin at that time     - 11/25/19 Lipo(a) 17mg/dl (ref range <=29mg/dl)  - 1/20/17 hsCRP 0.4  - 11/25/19 hsCRP 0.7   - 4/23/19 " "A1c 5.4%    # Family History of Colon Cancer  - mother diagnosed in her 40s  - last colonoscopy was 11/2022      Social History     Tobacco Use     Smoking status: Never     Smokeless tobacco: Never   Substance Use Topics     Alcohol use: Yes     Alcohol/week: 21.0 standard drinks of alcohol     Types: 21 Glasses of wine per week     Comment: 3 drinks a day         7/3/2023     8:46 AM   Alcohol Use   Prescreen: >3 drinks/day or >7 drinks/week? No          No data to display                Last PSA: No results found for: PSA    Reviewed orders with patient. Reviewed health maintenance and updated orders accordingly - Yes    Reviewed and updated as needed this visit by clinical staff   Tobacco  Allergies  Meds   Med Hx  Surg Hx  Fam Hx          Reviewed and updated as needed this visit by Provider   Tobacco  Allergies  Meds   Med Hx  Surg Hx  Fam Hx             Review of Systems   Constitutional: Negative for chills and fever.   HENT: Negative for congestion, ear pain, hearing loss and sore throat.    Eyes: Negative for pain and visual disturbance.   Respiratory: Negative for cough and shortness of breath.    Cardiovascular: Negative for chest pain, palpitations and peripheral edema.   Gastrointestinal: Negative for abdominal pain, constipation, diarrhea, heartburn, hematochezia and nausea.   Genitourinary: Negative for dysuria, frequency, genital sores, hematuria, impotence, penile discharge and urgency.   Musculoskeletal: Negative for arthralgias, joint swelling and myalgias.   Skin: Negative for rash.   Neurological: Negative for dizziness, weakness, headaches and paresthesias.   Psychiatric/Behavioral: Negative for mood changes. The patient is not nervous/anxious.      OBJECTIVE:   /84 (BP Location: Left arm, Patient Position: Sitting, Cuff Size: Adult Large)   Pulse 54   Temp 98.3  F (36.8  C) (Temporal)   Ht 1.859 m (6' 1.2\")   Wt 106.6 kg (235 lb)   SpO2 97%   BMI 30.84 kg/m  "     Physical Exam  GENERAL: healthy, alert and no distress  EYES: Eyes grossly normal to inspection, PERRL and conjunctivae and sclerae normal  HENT: ear canals and TM's normal, nose and mouth without ulcers or lesions  NECK: no adenopathy, no asymmetry, masses, or scars and thyroid normal to palpation  RESP: lungs clear to auscultation - no rales, rhonchi or wheezes  CV: regular rate and rhythm, normal S1 S2, no S3 or S4, no murmur, click or rub, no peripheral edema and peripheral pulses strong  ABDOMEN: soft, nontender, no hepatosplenomegaly, no masses and bowel sounds normal  MS: no gross musculoskeletal defects noted, no edema  SKIN: no suspicious lesions or rashes  NEURO: Normal strength and tone, mentation intact and speech normal  PSYCH: mentation appears normal, affect normal/bright    Diagnostic Test Results:  Labs reviewed in Epic    ASSESSMENT/PLAN:     (Z00.00) Routine general medical examination at a health care facility  (primary encounter diagnosis)  Comment: Age appropriate screening and preventive services provided.   Plan:     (E78.5) Dyslipidemia  Comment: Chronic, not at goal.  Recheck lipids.  Recommended restarting on rosuvastatin and remaining on this for the rest of his life, which Juancarlos was open to.   Plan: Lipid panel reflex to direct LDL Fasting,         rosuvastatin (CRESTOR) 10 MG tablet,         rosuvastatin (CRESTOR) 20 MG tablet          (K61.1) Perirectal abscess  Comment: Acute, uncomplicated  Plan: Appears to have resolved    (I48.0) PAF (paroxysmal atrial fibrillation) (H)  Comment: Chronic, stable.  Plan: s/p ablation. Continues to have briefly daily palpitations that have previously correlated with PACs on monitoring and are unchanged. I asked Juancarlos to let me know if they ever change in frequency, duration, or character and we'd plan for ambulatory monitoring.    He has decided to stop aspirin. His [id number] is 0. We discussed the risks and benefits of ASA for primary  "prevention for both ASCVD and for CVA from a fib.     (Z68.30) BMI 30.0-30.9,adult  Comment: Mostly checking actually for heavy alcohol use. Discussed risks of heavier use.   Plan: Hepatic panel          (Z13.1) Screening for diabetes mellitus  Comment: Plan: Glucose          (Z23) Need for hepatitis B vaccination  Comment: Plan: HEPATITIS B VACCINE,ADULT,IM          (Z80.0) Family history of colon cancer in mother  Comment: Colonoscopy every 5 years. Next due 11/2027.   Plan:     COUNSELING:   Reviewed preventive health counseling, as reflected in patient instructions      BMI:   Estimated body mass index is 30.84 kg/m  as calculated from the following:    Height as of this encounter: 1.859 m (6' 1.2\").    Weight as of this encounter: 106.6 kg (235 lb).     He reports that he has never smoked. He has never used smokeless tobacco.        Shyam Pierce MD   [facility]  "

## 2023-06-29 NOTE — PATIENT INSTRUCTIONS
1) Consider scheduling a nurse visit or with your pharmacy for the Shingles vaccine  It's 2 doses, 2-6 months apart    2) We are checking your cholesterol today and starting you back on Crestor  Start with 10mg for a month. Then go up to 20mg daily  If the [facility] here won't let you  the 20's, message me [place] and I'll send them there   MyPlate from USDA    MyPlate is an outline of a general healthy diet based on the 2010 Dietary Guidelines for Americans, from the U.S. Department of Agriculture (USDA). It sets guidelines for how much food you should eat from each food group based on your age, sex, and level of physical activity.  What are tips for following MyPlate?  To follow MyPlate recommendations:  · Eat a wide variety of fruits and vegetables, grains, and protein foods.  · Serve smaller portions and eat less food throughout the day.  · Limit portion sizes to avoid overeating.  · Enjoy your food.  · Get at least 150 minutes of exercise every week. This is about 30 minutes each day, 5 or more days per week.  It can be difficult to have every meal look like MyPlate. Think about MyPlate as eating guidelines for an entire day, rather than each individual meal.  Fruits and vegetables  · Make half of your plate fruits and vegetables.  · Eat many different colors of fruits and vegetables each day.  · For a 2,000 calorie daily food plan, eat:  ? 2½ cups of vegetables every day.  ? 2 cups of fruit every day.  · 1 cup is equal to:  ? 1 cup raw or cooked vegetables.  ? 1 cup raw fruit.  ? 1 medium-sized orange, apple, or banana.  ? 1 cup 100% fruit or vegetable juice.  ? 2 cups raw leafy greens, such as lettuce, spinach, or kale.  ? ½ cup dried fruit.  Grains  · One fourth of your plate should be grains.  · Make at least half of the grains you eat each day whole grains.  · For a 2,000 calorie daily food plan, eat 6 oz of grains every day.  · 1 oz is equal to:  ? 1 slice bread.  ? 1 cup cereal.  ? ½ cup cooked rice, cereal, or pasta.  Protein  · One fourth of your plate should be protein.  · Eat a wide variety of protein foods, including meat, poultry, fish, eggs, beans, nuts, and tofu.  · For a 2,000 calorie daily food plan, eat 5½ oz of protein every day.  · 1 oz is equal to:  ? 1 oz meat, poultry, or fish.  ? ¼ cup cooked beans.  ? 1 egg.  ? ½ oz nuts  or seeds.  ? 1 Tbsp peanut butter.  Dairy  · Drink fat-free or low-fat (1%) milk.  · Eat or drink dairy as a side to meals.  · For a 2,000 calorie daily food plan, eat or drink 3 cups of dairy every day.  · 1 cup is equal to:  ? 1 cup milk, yogurt, cottage cheese, or soy milk (soy beverage).  ? 2 oz processed cheese.  ? 1½ oz natural cheese.  Fats, oils, salt, and sugars  · Only small amounts of oils are recommended.  · Avoid foods that are high in calories and low in nutritional value (empty calories), like foods high in fat or added sugars.  · Choose foods that are low in salt (sodium). Choose foods that have less than 140 milligrams (mg) of sodium per serving.  · Drink water instead of sugary drinks. Drink enough water each day to keep your urine pale yellow.  Where to find support  · Work with your health care provider or a nutrition specialist (dietitian) to develop a customized eating plan that is right for you.  · Download an bessie (mobile application) to help you track your daily food intake.  Where to find more information  · Go to ChooseMyPlate.gov for more information.  Summary  · MyPlate is a general guideline for healthy eating from the USDA. It is based on the 2010 Dietary Guidelines for Americans.  · In general, fruits and vegetables should take up ½ of your plate, grains should take up ¼ of your plate, and protein should take up ¼ of your plate.  This information is not intended to replace advice given to you by your health care provider. Make sure you discuss any questions you have with your health care provider.  Document Revised: 05/21/2020 Document Reviewed: 03/19/2018  Elsevier Patient Education © 2021 Elsevier Inc.  BMI for Adults  What is BMI?  Body mass index (BMI) is a number that is calculated from a person's weight and height. BMI can help estimate how much of a person's weight is composed of fat. BMI does not measure body fat directly. Rather, it is an alternative to procedures that  "directly measure body fat, which can be difficult and expensive.  BMI can help identify people who may be at higher risk for certain medical problems.  What are BMI measurements used for?  BMI is used as a screening tool to identify possible weight problems. It helps determine whether a person is obese, overweight, a healthy weight, or underweight.  BMI is useful for:  · Identifying a weight problem that may be related to a medical condition or may increase the risk for medical problems.  · Promoting changes, such as changes in diet and exercise, to help reach a healthy weight. BMI screening can be repeated to see if these changes are working.  How is BMI calculated?  BMI involves measuring your weight in relation to your height. Both height and weight are measured, and the BMI is calculated from those numbers. This can be done either in English (U[place] or metric measurements. Note that charts and online BMI calculators are available to help you find your BMI quickly and easily without having to do these calculations yourself.  To calculate your BMI in English ([place].S[place] measurements:    1. Measure your weight in pounds (lb).  2. Multiply the number of pounds by 703.  ? For example, for a person who weighs 180 lb, multiply that number by 703, which equals 126,540.  3. Measure your height in inches. Then multiply that number by itself to get a measurement called \"inches squared.\"  ? For example, for a person who is 70 inches tall, the \"inches squared\" measurement is 70 inches x 70 inches, which equals 4,900 inches squared.  4. Divide the total from step 2 (number of lb x 703) by the total from step 3 (inches squared): 126,540 ÷ 4,900 = 25.8. This is your BMI.  To calculate your BMI in metric measurements:  1. Measure your weight in kilograms (kg).  2. Measure your height in meters (m). Then multiply that number by itself to get a measurement called \"meters squared.\"  ? For example, for a person who is 1.75 m tall, the " "\"meters squared\" measurement is 1.75 m x 1.75 m, which is equal to 3.1 meters squared.  3. Divide the number of kilograms (your weight) by the meters squared number. In this example: 70 ÷ 3.1 = 22.6. This is your BMI.  What do the results mean?  BMI charts are used to identify whether you are underweight, normal weight, overweight, or obese. The following guidelines will be used:  · Underweight: BMI less than 18.5.  · Normal weight: BMI between 18.5 and 24.9.  · Overweight: BMI between 25 and 29.9.  · Obese: BMI of 30 or above.  Keep these notes in mind:  · Weight includes both fat and muscle, so someone with a muscular build, such as an athlete, may have a BMI that is higher than 24.9. In cases like these, BMI is not an accurate measure of body fat.  · To determine if excess body fat is the cause of a BMI of 25 or higher, further assessments may need to be done by a health care provider.  · BMI is usually interpreted in the same way for men and women.  Where to find more information  For more information about BMI, including tools to quickly calculate your BMI, go to these websites:  · Centers for Disease Control and Prevention: www.cdc.gov  · American Heart Association: www.heart.org  · National Heart, Lung, and Blood [place]: www.nhlbi.nih.gov  Summary  · Body mass index (BMI) is a number that is calculated from a person's weight and height.  · BMI may help estimate how much of a person's weight is composed of fat. BMI can help identify those who may be at higher risk for certain medical problems.  · BMI can be measured using English measurements or metric measurements.  · BMI charts are used to identify whether you are underweight, normal weight, overweight, or obese.  This information is not intended to replace advice given to you by your health care provider. Make sure you discuss any questions you have with your health care provider.  Document Revised: 09/09/2020 Document Reviewed: 07/17/2020  Kadeem " Patient Education © 2021 Elsevier Inc.

## 2023-07-03 ENCOUNTER — OFFICE VISIT (OUTPATIENT)
Dept: FAMILY MEDICINE | Facility: CLINIC | Age: 48
End: 2023-07-03
Payer: COMMERCIAL

## 2023-07-03 VITALS
DIASTOLIC BLOOD PRESSURE: 84 MMHG | HEART RATE: 54 BPM | HEIGHT: 73 IN | TEMPERATURE: 98.3 F | OXYGEN SATURATION: 97 % | BODY MASS INDEX: 31.14 KG/M2 | WEIGHT: 235 LBS | SYSTOLIC BLOOD PRESSURE: 124 MMHG

## 2023-07-03 DIAGNOSIS — E78.5 DYSLIPIDEMIA: Chronic | ICD-10-CM

## 2023-07-03 DIAGNOSIS — K61.1 PERIRECTAL ABSCESS: ICD-10-CM

## 2023-07-03 DIAGNOSIS — Z13.1 SCREENING FOR DIABETES MELLITUS: ICD-10-CM

## 2023-07-03 DIAGNOSIS — Z23 NEED FOR HEPATITIS B VACCINATION: ICD-10-CM

## 2023-07-03 DIAGNOSIS — Z00.00 ROUTINE GENERAL MEDICAL EXAMINATION AT A HEALTH CARE FACILITY: Primary | ICD-10-CM

## 2023-07-03 DIAGNOSIS — I48.0 PAF (PAROXYSMAL ATRIAL FIBRILLATION) (H): Chronic | ICD-10-CM

## 2023-07-03 DIAGNOSIS — Z80.0 FAMILY HISTORY OF COLON CANCER IN MOTHER: Chronic | ICD-10-CM

## 2023-07-03 LAB
ALBUMIN SERPL BCG-MCNC: 4.6 G/DL (ref 3.5–5.2)
ALP SERPL-CCNC: 59 U/L (ref 40–129)
ALT SERPL W P-5'-P-CCNC: 30 U/L (ref 0–70)
AST SERPL W P-5'-P-CCNC: 42 U/L (ref 0–45)
BILIRUB DIRECT SERPL-MCNC: <0.2 MG/DL (ref 0–0.3)
BILIRUB SERPL-MCNC: 0.5 MG/DL
CHOLEST SERPL-MCNC: 313 MG/DL
FASTING STATUS PATIENT QL REPORTED: YES
GLUCOSE SERPL-MCNC: 104 MG/DL (ref 70–99)
HDLC SERPL-MCNC: 54 MG/DL
LDLC SERPL CALC-MCNC: 236 MG/DL
NONHDLC SERPL-MCNC: 259 MG/DL
PROT SERPL-MCNC: 7.7 G/DL (ref 6.4–8.3)
TRIGL SERPL-MCNC: 115 MG/DL

## 2023-07-03 PROCEDURE — 82947 ASSAY GLUCOSE BLOOD QUANT: CPT | Performed by: FAMILY MEDICINE

## 2023-07-03 PROCEDURE — 80076 HEPATIC FUNCTION PANEL: CPT | Performed by: FAMILY MEDICINE

## 2023-07-03 PROCEDURE — 83718 ASSAY OF LIPOPROTEIN: CPT | Performed by: FAMILY MEDICINE

## 2023-07-03 RX ORDER — ROSUVASTATIN CALCIUM 10 MG/1
10 TABLET, COATED ORAL DAILY
Qty: 30 TABLET | Refills: 0 | Status: SHIPPED | OUTPATIENT
Start: 2023-07-03 | End: 2023-08-28

## 2023-07-03 RX ORDER — ROSUVASTATIN CALCIUM 20 MG/1
20 TABLET, COATED ORAL DAILY
Qty: 90 TABLET | Refills: 3 | Status: SHIPPED | OUTPATIENT
Start: 2023-07-03 | End: 2023-08-28

## 2023-07-03 ASSESSMENT — ENCOUNTER SYMPTOMS
ABDOMINAL PAIN: 0
EYE PAIN: 0
COUGH: 0
DIZZINESS: 0
SHORTNESS OF BREATH: 0
WEAKNESS: 0
MYALGIAS: 0
DIARRHEA: 0
PALPITATIONS: 0
DYSURIA: 0
HEMATURIA: 0
HEARTBURN: 0
JOINT SWELLING: 0
ARTHRALGIAS: 0
HEADACHES: 0
PARESTHESIAS: 0
CHILLS: 0
CONSTIPATION: 0
SORE THROAT: 0
FREQUENCY: 0
NERVOUS/ANXIOUS: 0
FEVER: 0
NAUSEA: 0
HEMATOCHEZIA: 0

## 2023-07-03 NOTE — NURSING NOTE
"47 year old  Chief Complaint   Patient presents with     Physical     No concerns            Blood pressure 124/84, pulse 54, temperature 98.3  F (36.8  C), temperature source Temporal, height 1.859 m (6' 1.2\"), weight 106.6 kg (235 lb), SpO2 97 %. Body mass index is 30.84 kg/m .  Patient Active Problem List   Diagnosis     PAF (paroxysmal atrial fibrillation) (H)     Family history of colon cancer in mother     Herniated vertebral disc     Dyslipidemia     Perirectal abscess            Wt Readings from Last 2 Encounters:   07/03/23 106.6 kg (235 lb)   05/31/23 99.8 kg (220 lb)     BP Readings from Last 3 Encounters:   07/03/23 124/84   05/31/23 137/83   05/21/23 130/80              Current Outpatient Medications   Medication     acetaminophen (TYLENOL) 325 MG tablet     aspirin (ASA) 81 MG chewable tablet     No current facility-administered medications for this visit.            Social History     Tobacco Use     Smoking status: Never     Smokeless tobacco: Never   Substance Use Topics     Alcohol use: Yes     Alcohol/week: 14.0 standard drinks of alcohol     Types: 14 Glasses of wine per week     Comment: 2-3 drinks a day     Drug use: Not Currently            Health Maintenance Due   Topic Date Due     HEPATITIS B IMMUNIZATION (1 of 3 - 3-dose series) Never done     PHQ-2 (once per calendar year)  01/01/2023            No results found for: PAP           July 3, 2023 8:53 AM    "

## 2023-07-03 NOTE — NURSING NOTE
Prior to immunization administration, verified patients identity using patient s name and date of birth. Please see Immunization Activity for additional information.     Screening Questionnaire for Adult Immunization    Are you sick today?   No   Do you have allergies to medications, food, a vaccine component or latex?   No   Have you ever had a serious reaction after receiving a vaccination?   No   Do you have a long-term health problem with heart, lung, kidney, or metabolic disease (e.g., diabetes), asthma, a blood disorder, no spleen, complement component deficiency, a cochlear implant, or a spinal fluid leak?  Are you on long-term aspirin therapy?   No   Do you have cancer, leukemia, HIV/AIDS, or any other immune system problem?   No   Do you have a parent, brother, or sister with an immune system problem?   No   In the past 3 months, have you taken medications that affect  your immune system, such as prednisone, other steroids, or anticancer drugs; drugs for the treatment of rheumatoid arthritis, Crohn s disease, or psoriasis; or have you had radiation treatments?   No   Have you had a seizure, or a brain or other nervous system problem?   No   During the past year, have you received a transfusion of blood or blood    products, or been given immune (gamma) globulin or antiviral drug?   No   For women: Are you pregnant or is there a chance you could become       pregnant during the next month?   No   Have you received any vaccinations in the past 4 weeks?   No     Immunization questionnaire answers were all negative.      Patient instructed to remain in clinic for 15 minutes afterwards, and to report any adverse reactions.     Screening performed by Loren Iraheta LPN on 7/3/2023 at 9:53 AM.

## 2023-08-26 DIAGNOSIS — E78.5 DYSLIPIDEMIA: Chronic | ICD-10-CM

## 2023-08-28 RX ORDER — ROSUVASTATIN CALCIUM 20 MG/1
20 TABLET, COATED ORAL DAILY
Qty: 90 TABLET | Refills: 3 | Status: SHIPPED | OUTPATIENT
Start: 2023-08-28 | End: 2024-08-12

## 2023-08-28 RX ORDER — ROSUVASTATIN CALCIUM 10 MG/1
10 TABLET, COATED ORAL DAILY
Qty: 30 TABLET | Refills: 0 | OUTPATIENT
Start: 2023-08-28

## 2023-08-28 NOTE — TELEPHONE ENCOUNTER
Medication requested: rosuvastatin (CRESTOR) 10 MG tablet   Last office visit: 7/3/23  [facility] appointments: none  Medication last refilled: 7/3/23; 30 + 0 refills  Last qualifying labs:   Component      Latest Ref Rng 7/3/2023  9:52 AM   Cholesterol      <200 mg/dL 313 (H)    Triglycerides      <150 mg/dL 115    HDL Cholesterol      >=40 mg/dL 54    LDL Cholesterol Calculated      <=100 mg/dL 236 (H)    Non HDL Cholesterol      <130 mg/dL 259 (H)      Routing refill request to provider for review/approval because:  Uncertain whether pt is supposed to be taking combined rosuvastatin Rxs for total daily dose or 30 mg.    Socrates CRUZ, RN  08/28/23 12:16 PM

## 2024-03-21 NOTE — PROGRESS NOTES
"Juancarlos Causey is a 48 year old male who previously saw Dr. Pierce but is transferring to my clinic. He has hx of paroxysmal Afib (ablation 2019) perirectal abscess, dyslipidemia, moderate alcohol consumption, family hx of colon cancer. He is here for the following issues:    Lower urinary tract symptoms  Urgency, frequency, small amounts x 3-4 d  Negative pressure at end of stream  Has happened in the past but transient, did not seek medical care over past 2 yr  No hesitancy, urine stream ok. Some dribbling  No hx of UTI  Nocturia x 1  Caffeine 2 per day  Etoh: \"cutting back\" now 1-2 per day  No NSAIDs    Ablation for flutter  2019 well since then  Had rapid HR, got dizzy, \"wear me out\"  Occasional PVC,   Flu like symptoms  3 wks ago, got it from son  Ill for a week, fever, 101  Symptoms have improved  Vertigo, nausea, improved, ringing gone      Hyperlipidemia  Most recent cholesterol panel last July  On rosuvastatin 20mg daily since that time  Due for cholesterol check  Recent Labs   Lab Test 07/03/23  0952 06/11/21  0832 02/03/20  0845   CHOL 313* 266.0* 201.0*   HDL 54 54.0 53.0   * 196.0* 128.0   TRIG 115 77.0 97.0   CHOLHDLRATIO  --  4.9 3.8     BP check  No current antihypertensive meds  BP Readings from Last 3 Encounters:   03/22/24 139/87   07/03/23 124/84   05/31/23 137/83           Patient Active Problem List   Diagnosis    PAF (paroxysmal atrial fibrillation) (H)    Family history of colon cancer in mother    Herniated vertebral disc    Dyslipidemia       Current Outpatient Medications   Medication Sig Dispense Refill    acetaminophen (TYLENOL) 325 MG tablet Take 3 tablets (975 mg) by mouth every 6 hours as needed for mild pain or other (and adjunct with moderate or severe pain or per patient request)      rosuvastatin (CRESTOR) 20 MG tablet Take 1 tablet (20 mg) by mouth daily 90 tablet 3       No Known Allergies     EXAM  /87 (BP Location: Left arm, Patient Position: Sitting, Cuff " Size: Adult Large)   Pulse 56   Temp 97.9  F (36.6  C) (Skin)   Wt 109.3 kg (241 lb)   SpO2 94%   BMI 31.62 kg/m    Gen: Alert, pleasant, NAD  COR: S1,S2, no murmur. No ectopy  Lungs: CTA bilaterally, no rhonchi, wheezes or rales  Abdomen: Soft, non tender, normal bowel sounds, no HSM or mass  Back: no CVAT    Results for orders placed or performed in visit on 03/22/24   Urinalysis Macroscopic     Status: Abnormal   Result Value Ref Range    Color Urine Dark Yellow (A) Colorless, Straw, Light Yellow, Yellow    Appearance Urine Clear Clear    Glucose Urine Negative Negative mg/dL    Bilirubin Urine Negative Negative    Ketones Urine Negative Negative mg/dL    Specific Gravity Urine 1.020 1.003 - 1.035    Blood Urine Negative Negative    pH Urine 7.0 5.0 - 7.0    Protein Albumin Urine Negative Negative mg/dL    Urobilinogen Urine 0.2 0.2, 1.0 E.U./dL    Nitrite Urine Negative Negative    Leukocyte Esterase Urine Negative Negative   Hemoglobin A1c     Status: Abnormal   Result Value Ref Range    Hemoglobin A1C 5.7 (H) 0.0 - 5.6 %   Prostate spec antigen screen     Status: Normal   Result Value Ref Range    Prostate Specific Antigen Screen 0.82 0.00 - 3.50 ng/mL    Narrative    This result is obtained using the Roche Elecsys total PSA method on the nicole [id number] immunoassay analyzer. Results obtained with different assay methods or kits cannot be used interchangeably.   Lipid Profile     Status: Abnormal   Result Value Ref Range    Cholesterol 206 (H) <200 mg/dL    Triglycerides 77 <150 mg/dL    Direct Measure HDL 55 >=40 mg/dL    LDL Cholesterol Calculated 136 (H) <=100 mg/dL    Non HDL Cholesterol 151 (H) <130 mg/dL    Patient Fasting > 8hrs? No     Narrative    Cholesterol  Desirable:  <200 mg/dL    Triglycerides  Normal:  Less than 150 mg/dL  Borderline High:  150-199 mg/dL  High:  200-499 mg/dL  Very High:  Greater than or equal to 500 mg/dL    Direct Measure HDL  Female:  Greater than or equal to 50 mg/dL    Male:  Greater than or equal to 40 mg/dL    LDL Cholesterol  Desirable:  <100mg/dL  Above Desirable:  100-129 mg/dL   Borderline High:  130-159 mg/dL   High:  160-189 mg/dL   Very High:  >= 190 mg/dL    Non HDL Cholesterol  Desirable:  130 mg/dL  Above Desirable:  130-159 mg/dL  Borderline High:  160-189 mg/dL  High:  190-219 mg/dL  Very High:  Greater than or equal to 220 mg/dL   CRP inflammation     Status: Normal   Result Value Ref Range    CRP Inflammation <3.00 <5.00 mg/L   Basic metabolic panel     Status: Abnormal   Result Value Ref Range    Sodium 137 135 - 145 mmol/L    Potassium 4.5 3.4 - 5.3 mmol/L    Chloride 103 98 - 107 mmol/L    Carbon Dioxide (CO2) 25 22 - 29 mmol/L    Anion Gap 9 7 - 15 mmol/L    Urea Nitrogen 9.2 6.0 - 20.0 mg/dL    Creatinine 0.65 (L) 0.67 - 1.17 mg/dL    GFR Estimate >90 >60 mL/min/1.73m2    Calcium 9.4 8.6 - 10.0 mg/dL    Glucose 96 70 - 99 mg/dL   CBC with platelets and differential     Status: None   Result Value Ref Range    WBC Count 5.1 4.0 - 11.0 10e3/uL    RBC Count 4.94 4.40 - 5.90 10e6/uL    Hemoglobin 15.1 13.3 - 17.7 g/dL    Hematocrit 45.4 40.0 - 53.0 %    MCV 92 78 - 100 fL    MCH 30.6 26.5 - 33.0 pg    MCHC 33.3 31.5 - 36.5 g/dL    RDW 12.6 10.0 - 15.0 %    Platelet Count 195 150 - 450 10e3/uL    % Neutrophils 55 %    % Lymphocytes 36 %    % Monocytes      Mids % (Monos, Eos, Basos) 9 %    % Eosinophils      % Basophils      % Immature Granulocytes      Absolute Neutrophils 2.8 1.6 - 8.3 10e3/uL    Absolute Lymphocytes 1.9 0.8 - 5.3 10e3/uL    Absolute Monocytes      Mids Abs (Monos, Eos, Basos) 0.4 0.0 - 2.2 10e3/uL    Absolute Eosinophils      Absolute Basophils      Absolute Immature Granulocytes     CBC with platelets differential     Status: None    Narrative    The following orders were created for panel order CBC with platelets differential.  Procedure                               Abnormality         Status                     ---------                                -----------         ------                     CBC with platelets and d...[[id number]]                      Final result                 Please view results for these tests on the individual orders.           Assessment:  (R39.9) Lower urinary tract symptoms  (primary encounter diagnosis)  Comment: 3 d hx of symptoms which sound consistent with infection  Plan: Urinalysis Macroscopic, Urine Culture Aerobic         Bacterial, CBC with platelets differential,         Hemoglobin A1c, Prostate spec antigen screen,         CRP inflammation, ciprofloxacin (CIPRO) 500 MG         tablet, Basic metabolic panel        Treating empirically for prostatitis, ciprofloxacin bid x 4 wk  He is traveling to [place] Marh 28-April 16  Return after his travels for check    (E78.5) Dyslipidemia  Comment: he likely has familial hyperlipidemia, LDL cholesterol has dropped 100 pts  Recent Labs   Lab Test 03/22/24  1229 07/03/23  0952 06/11/21  0832 02/03/20  0845   CHOL 206* 313* 266.0* 201.0*   HDL 55 54 54.0 53.0   * 236* 196.0* 128.0   TRIG 77 115 77.0 97.0   CHOLHDLRATIO  --   --  4.9 3.8   Plan: Lipid Profile        Continue rosuvastatin    (I48.0) PAF (paroxysmal atrial fibrillation) (H)  Comment: no current symptoms   Plan: monitor for any changes    Татьяна Crews MD  Internal Medicine/Pediatrics

## 2024-03-22 ENCOUNTER — OFFICE VISIT (OUTPATIENT)
Dept: FAMILY MEDICINE | Facility: CLINIC | Age: 49
End: 2024-03-22
Payer: COMMERCIAL

## 2024-03-22 VITALS
HEART RATE: 56 BPM | TEMPERATURE: 97.9 F | WEIGHT: 241 LBS | BODY MASS INDEX: 31.62 KG/M2 | SYSTOLIC BLOOD PRESSURE: 139 MMHG | OXYGEN SATURATION: 94 % | DIASTOLIC BLOOD PRESSURE: 87 MMHG

## 2024-03-22 DIAGNOSIS — E78.5 DYSLIPIDEMIA: Chronic | ICD-10-CM

## 2024-03-22 DIAGNOSIS — R39.9 LOWER URINARY TRACT SYMPTOMS: Primary | ICD-10-CM

## 2024-03-22 DIAGNOSIS — I48.0 PAF (PAROXYSMAL ATRIAL FIBRILLATION) (H): ICD-10-CM

## 2024-03-22 LAB
ALBUMIN UR-MCNC: NEGATIVE MG/DL
APPEARANCE UR: CLEAR
BASO+EOS+MONOS # BLD AUTO: 0.4 10E3/UL (ref 0–2.2)
BASO+EOS+MONOS NFR BLD AUTO: 9 %
BASOPHILS # BLD AUTO: NORMAL 10*3/UL
BASOPHILS NFR BLD AUTO: NORMAL %
BILIRUB UR QL STRIP: NEGATIVE
COLOR UR AUTO: ABNORMAL
EOSINOPHIL # BLD AUTO: NORMAL 10*3/UL
EOSINOPHIL NFR BLD AUTO: NORMAL %
ERYTHROCYTE [DISTWIDTH] IN BLOOD BY AUTOMATED COUNT: 12.6 % (ref 10–15)
GLUCOSE UR STRIP-MCNC: NEGATIVE MG/DL
HBA1C MFR BLD: 5.7 % (ref 0–5.6)
HCT VFR BLD AUTO: 45.4 % (ref 40–53)
HGB BLD-MCNC: 15.1 G/DL (ref 13.3–17.7)
HGB UR QL STRIP: NEGATIVE
IMM GRANULOCYTES # BLD: NORMAL 10*3/UL
IMM GRANULOCYTES NFR BLD: NORMAL %
KETONES UR STRIP-MCNC: NEGATIVE MG/DL
LEUKOCYTE ESTERASE UR QL STRIP: NEGATIVE
LYMPHOCYTES # BLD AUTO: 1.9 10E3/UL (ref 0.8–5.3)
LYMPHOCYTES NFR BLD AUTO: 36 %
MCH RBC QN AUTO: 30.6 PG (ref 26.5–33)
MCHC RBC AUTO-ENTMCNC: 33.3 G/DL (ref 31.5–36.5)
MCV RBC AUTO: 92 FL (ref 78–100)
MONOCYTES # BLD AUTO: NORMAL 10*3/UL
MONOCYTES NFR BLD AUTO: NORMAL %
NEUTROPHILS # BLD AUTO: 2.8 10E3/UL (ref 1.6–8.3)
NEUTROPHILS NFR BLD AUTO: 55 %
NITRATE UR QL: NEGATIVE
PH UR STRIP: 7 [PH] (ref 5–7)
PLATELET # BLD AUTO: 195 10E3/UL (ref 150–450)
RBC # BLD AUTO: 4.94 10E6/UL (ref 4.4–5.9)
SP GR UR STRIP: 1.02 (ref 1–1.03)
UROBILINOGEN UR STRIP-ACNC: 0.2 E.U./DL
WBC # BLD AUTO: 5.1 10E3/UL (ref 4–11)

## 2024-03-22 PROCEDURE — 86140 C-REACTIVE PROTEIN: CPT | Performed by: INTERNAL MEDICINE

## 2024-03-22 PROCEDURE — G0103 PSA SCREENING: HCPCS | Performed by: INTERNAL MEDICINE

## 2024-03-22 PROCEDURE — 80061 LIPID PANEL: CPT | Performed by: INTERNAL MEDICINE

## 2024-03-22 PROCEDURE — 80048 BASIC METABOLIC PNL TOTAL CA: CPT | Performed by: INTERNAL MEDICINE

## 2024-03-22 RX ORDER — CIPROFLOXACIN 500 MG/1
500 TABLET, FILM COATED ORAL 2 TIMES DAILY
Qty: 60 TABLET | Refills: 0 | Status: SHIPPED | OUTPATIENT
Start: 2024-03-22 | End: 2024-10-07

## 2024-03-22 NOTE — NURSING NOTE
"Juancarlos  48 year old    Chief Complaint   Patient presents with    Urinary Problem     Has felt frequent urgency, slight discomfort after urinating for the last week, but symptoms have gone away in the last day. Denies any odor or changes in urine appearance.             Blood pressure 139/87, pulse 56, temperature 97.9  F (36.6  C), temperature source Skin, weight 109.3 kg (241 lb), SpO2 94%. Body mass index is 31.62 kg/m .    Patient Active Problem List   Diagnosis    PAF (paroxysmal atrial fibrillation) (H)    Family history of colon cancer in mother    Herniated vertebral disc    Dyslipidemia              Wt Readings from Last 2 Encounters:   03/22/24 109.3 kg (241 lb)   07/03/23 106.6 kg (235 lb)       BP Readings from Last 3 Encounters:   03/22/24 139/87   07/03/23 124/84   05/31/23 137/83                Current Outpatient Medications   Medication    acetaminophen (TYLENOL) 325 MG tablet    rosuvastatin (CRESTOR) 20 MG tablet     No current facility-administered medications for this visit.              Social History     Tobacco Use    Smoking status: Never    Smokeless tobacco: Never   Substance Use Topics    Alcohol use: Yes     Alcohol/week: 21.0 standard drinks of alcohol     Types: 21 Glasses of wine per week     Comment: 3 drinks a day    Drug use: Not Currently              Health Maintenance Due   Topic Date Due    Pneumococcal Vaccine: Pediatrics (0 to 5 Years) and At-Risk Patients (6 to 64 Years) (1 of 2 - PCV) Never done    HEPATITIS B IMMUNIZATION (2 of 3 - 19+ 3-dose series) 07/31/2023    COVID-19 Vaccine (6 - 2023-24 season) 09/01/2023    ZOSTER IMMUNIZATION (1 of 2) 08/14/2023            No results found for: \"PAP\"           March 22, 2024 11:47 AM    "

## 2024-03-23 LAB
ANION GAP SERPL CALCULATED.3IONS-SCNC: 9 MMOL/L (ref 7–15)
BUN SERPL-MCNC: 9.2 MG/DL (ref 6–20)
CALCIUM SERPL-MCNC: 9.4 MG/DL (ref 8.6–10)
CHLORIDE SERPL-SCNC: 103 MMOL/L (ref 98–107)
CHOLEST SERPL-MCNC: 206 MG/DL
CREAT SERPL-MCNC: 0.65 MG/DL (ref 0.67–1.17)
CRP SERPL-MCNC: <3 MG/L
DEPRECATED HCO3 PLAS-SCNC: 25 MMOL/L (ref 22–29)
EGFRCR SERPLBLD CKD-EPI 2021: >90 ML/MIN/1.73M2
FASTING STATUS PATIENT QL REPORTED: NO
GLUCOSE SERPL-MCNC: 96 MG/DL (ref 70–99)
HDLC SERPL-MCNC: 55 MG/DL
LDLC SERPL CALC-MCNC: 136 MG/DL
NONHDLC SERPL-MCNC: 151 MG/DL
POTASSIUM SERPL-SCNC: 4.5 MMOL/L (ref 3.4–5.3)
PSA SERPL DL<=0.01 NG/ML-MCNC: 0.82 NG/ML (ref 0–3.5)
SODIUM SERPL-SCNC: 137 MMOL/L (ref 135–145)
TRIGL SERPL-MCNC: 77 MG/DL

## 2024-03-24 PROBLEM — R39.9 LOWER URINARY TRACT SYMPTOMS: Status: ACTIVE | Noted: 2024-03-24

## 2024-04-22 NOTE — PROGRESS NOTES
"Juancarlos Causey is a 48 year old male with paroxysmal Afib, herniated disc, dyslipidemia, urinary symptoms, he is here for the following issues:    Lower urinary tract symptoms  Last visit 3/22/24 for urinary symptoms, frequency, urgency  Treated empirically for prostate infection  Rx for cipro but pt concern for sun exposure with antibiotic so did not take it  Symptoms abated while he was awaiting answer about sun sensitivity  Traveled to [place], returned one week ago  4 d ago, noted recurrence of \"negative pressure sensation in bladder\" urinary frequency, urgency  Also noted darkened urine and some darker blood at end of stream,  Started taking Ciprofloxacin at that time and has remained on medication.   No symptoms the following day  Drinking more water and negative pressure sensation has improved      Patient Active Problem List   Diagnosis    PAF (paroxysmal atrial fibrillation) (H)    Family history of colon cancer in mother    Herniated vertebral disc    Dyslipidemia    Lower urinary tract symptoms       Current Outpatient Medications   Medication Sig Dispense Refill    acetaminophen (TYLENOL) 325 MG tablet Take 3 tablets (975 mg) by mouth every 6 hours as needed for mild pain or other (and adjunct with moderate or severe pain or per patient request)      ciprofloxacin (CIPRO) 500 MG tablet Take 1 tablet (500 mg) by mouth 2 times daily X 30 days 60 tablet 0    rosuvastatin (CRESTOR) 20 MG tablet Take 1 tablet (20 mg) by mouth daily 90 tablet 3       No Known Allergies     EXAM  /85 (BP Location: Left arm, Patient Position: Sitting, Cuff Size: Adult Large)   Pulse 57   Temp 97.1  F (36.2  C) (Skin)   Resp 15   Ht 1.859 m (6' 1.2\")   Wt 107.5 kg (237 lb)   SpO2 96%   BMI 31.10 kg/m    Gen: Alert, pleasant, NAD  COR: S1,S2, no murmur  Lungs: CTA bilaterally, no rhonchi, wheezes or rales  Back : no CVAT  Abdomen: Soft, no tenderness      Assessment:  (R39.9) Lower urinary tract symptoms  " (primary encounter diagnosis)  Comment: episodic urinary pressure, urgency, frequency, and hematuria, does not follow typical pattern of prostatitis, ? Structural issues, bladder/urethral polyp, cyst  Plan: Adult Urology  Referral        Refer to urology clinic to evaluation and treatment.     Татьяна Crews MD  Internal Medicine/Pediatrics

## 2024-04-23 ENCOUNTER — OFFICE VISIT (OUTPATIENT)
Dept: FAMILY MEDICINE | Facility: CLINIC | Age: 49
End: 2024-04-23
Payer: COMMERCIAL

## 2024-04-23 VITALS
TEMPERATURE: 97.1 F | SYSTOLIC BLOOD PRESSURE: 131 MMHG | RESPIRATION RATE: 15 BRPM | OXYGEN SATURATION: 96 % | DIASTOLIC BLOOD PRESSURE: 85 MMHG | WEIGHT: 237 LBS | BODY MASS INDEX: 31.41 KG/M2 | HEIGHT: 73 IN | HEART RATE: 57 BPM

## 2024-04-23 DIAGNOSIS — R39.9 LOWER URINARY TRACT SYMPTOMS: Primary | ICD-10-CM

## 2024-04-23 NOTE — NURSING NOTE
"48 year old  Chief Complaint   Patient presents with    RECHECK     Follow up on UTI - pt does not report any symptoms currently. They are taking antibiotics right now (sx started last Thursday and started medication last Friday).        Blood pressure 131/85, pulse 57, temperature 97.1  F (36.2  C), temperature source Skin, resp. rate 15, height 1.859 m (6' 1.2\"), weight 107.5 kg (237 lb), SpO2 96%. Body mass index is 31.1 kg/m .  Patient Active Problem List   Diagnosis    PAF (paroxysmal atrial fibrillation) (H)    Family history of colon cancer in mother    Herniated vertebral disc    Dyslipidemia    Lower urinary tract symptoms       Wt Readings from Last 2 Encounters:   04/23/24 107.5 kg (237 lb)   03/22/24 109.3 kg (241 lb)     BP Readings from Last 3 Encounters:   04/23/24 131/85   03/22/24 139/87   07/03/23 124/84         Current Outpatient Medications   Medication Sig Dispense Refill    ciprofloxacin (CIPRO) 500 MG tablet Take 1 tablet (500 mg) by mouth 2 times daily X 30 days 60 tablet 0    rosuvastatin (CRESTOR) 20 MG tablet Take 1 tablet (20 mg) by mouth daily 90 tablet 3     No current facility-administered medications for this visit.       Social History     Tobacco Use    Smoking status: Never    Smokeless tobacco: Never   Vaping Use    Vaping status: Never Used   Substance Use Topics    Alcohol use: Yes     Alcohol/week: 21.0 standard drinks of alcohol     Types: 21 Glasses of wine per week     Comment: 3 drinks a day    Drug use: Not Currently       Health Maintenance Due   Topic Date Due    Pneumococcal Vaccine: Pediatrics (0 to 5 Years) and At-Risk Patients (6 to 64 Years) (1 of 2 - PCV) Never done    HEPATITIS B IMMUNIZATION (2 of 3 - 19+ 3-dose series) 07/31/2023    COVID-19 Vaccine (6 - 2023-24 season) 09/01/2023    ZOSTER IMMUNIZATION (1 of 2) 08/14/2023    ADVANCE CARE PLANNING  04/23/2024       No results found for: \"PAP\"      April 23, 2024 9:04 AM    "

## 2024-04-29 ENCOUNTER — VIRTUAL VISIT (OUTPATIENT)
Dept: FAMILY MEDICINE | Facility: CLINIC | Age: 49
End: 2024-04-29
Payer: COMMERCIAL

## 2024-04-29 DIAGNOSIS — R73.03 PREDIABETES: ICD-10-CM

## 2024-04-29 DIAGNOSIS — Z71.3 DIETARY COUNSELING AND SURVEILLANCE: Primary | ICD-10-CM

## 2024-04-29 NOTE — PROGRESS NOTES
[facility] Assessment    Juancarlos is a 48 year old male who presents for nutrition counseling related to elevated A1c. Hx of type 2 diabetes in family (paternal). Has been tweaking habits since the beginning of the year. Has kept a food diary since Friday in preparation for today's visit. Noticed that when tracking had reduced snacking. Multiple times eating with friends, had more wine and beer. Has never dieted, wants to focus more on portion control.    Recent diet recall:  Wakes at 7 am  Breakfast: 2 cups coffee with half and half; 75 g bran flakes with blueberries,7 oz whole milk); toast with soft boiled egg, sm butter.   No snack usually  Lunch 11:30 am: ham, wild rice with mushrooms and cranberries. Wild rice 1 cup, salad with salmon salad; pc pizza thin crust; 69 g salmon salad on sandwich (sour dough), 23 g dey, lettuce spring mix  Snack 4:30-5 pm (after a run): varies. 4 saltines with sharp cheddar; appetizers if out with friends (cheese with triscuits, carrots); 1/2 med egg roll. Sometimes a glass of wine.   Dinner: chicken pot pie (lots of cheese), salad with lettuce greens, 2 beers (summit pale prabhjot), handful tortilla chips; at friends -- chicken breast with lemon, salad with balsamic, button mushrooms in butter, rice pilaf, 1 york peppermint sid for dessert, fruit smoothie 6 oz; 2 thick slices of ham, 2 servings whole dairy scallop potatoes, mac and cheese casserole. Dessert - not often. Last night had a hot fudge sundae, sometimes ice cream (2-3 days/week, maybe less). Likes to drink milk, sometimes skim milk.   Snack: sometimes. If having an after dinner drink, would also snack. Leftovers or cheese and crackers.     Questions for today:  Loves whole milk dairy/cheese, is that ok  Not a lot of meat/red meat, should he eat more?  Should track carbs?   Wanting to reduce evening snack -- tracking evening drinking    Social:  (spouse Lizzette). Neurodivergent daughter working with OT. Son  also. 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   Physical activity: Exercises regularly running and biking. Professor at [employer].  Medications:  crestor  Vitamins/Supplements: not discussed today    Recent weights:   Wt Readings from Last 6 Encounters:   04/23/24 107.5 kg (237 lb)   03/22/24 109.3 kg (241 lb)   07/03/23 106.6 kg (235 lb)   05/31/23 99.8 kg (220 lb)   05/20/23 99.8 kg (220 lb)   06/11/21 102.1 kg (225 lb)     Recent A1C values:   Hemoglobin A1C   Date Value Ref Range Status   03/22/2024 5.7 (H) 0.0 - 5.6 % Final     Comment:     Normal <5.7%   Prediabetes 5.7-6.4%    Diabetes 6.5% or higher     Note: Adopted from ADA consensus guidelines.   06/11/2021 5.5 4.1 - 5.7 % Final   04/23/2019 5.4 3.5 - 6.0 % Final   01/20/2017 5.5 4.2 - 6.1 % Final     Recent lipid values:   Cholesterol   Date Value Ref Range Status   03/22/2024 206 (H) <200 mg/dL Final   07/03/2023 313 (H) <200 mg/dL Final   06/11/2021 266.0 (H) 0.0 - 200.0 Final   02/03/2020 201.0 (H) 0.0 - 200.0 Final     HDL Cholesterol   Date Value Ref Range Status   06/11/2021 54.0 >40.0 Final   02/03/2020 53.0 >40.0 Final     Direct Measure HDL   Date Value Ref Range Status   03/22/2024 55 >=40 mg/dL Final   07/03/2023 54 >=40 mg/dL Final     LDL Cholesterol Calculated   Date Value Ref Range Status   03/22/2024 136 (H) <=100 mg/dL Final   07/03/2023 236 (H) <=100 mg/dL Final     LDL Cholesterol Direct   Date Value Ref Range Status   06/11/2021 196.0 (H) 0.0 - 129.0 Final   02/03/2020 128.0 0.0 - 129.0 Final     Triglycerides   Date Value Ref Range Status   03/22/2024 77 <150 mg/dL Final   07/03/2023 115 <150 mg/dL Final   06/11/2021 77.0 0.0 - 150.0 Final   02/03/2020 97.0 0.0 - 150.0 Final       Intervention(s):  Juancarlos is a 48 year old male who presents for nutrition counseling related to elevated A1c. We discussed various impacts that food, movement and medication on blood sugar  "regulation. Reviewed carbohydrate foods and general portion sizes.   Consider a planned snack in the afternoon after exercise. Planning ahead of time can help balance portions and prevent grazing mid-afternoon. Having this snack will help moderate hunger going into the dinner meal (hopefully not feel over hungry)  Notice pace at dinner and how this may impact portions. Try to slow pace slightly if possible, particularly before having a second helping.   If reaching for a snack after dinner, ask self \"What do I need?\". If looking for fun/darin, what other ways can that be achieved. If a habit, is there another ritual at night instead.   Consider hot tea each night instead of an after dinner alcoholic beverage    Follow up: Late July 2024, Juancarlos will schedule    Patient referred by Татьяна Crews MD   Total time spent with patient 50 minutes    Jennifer Coronado RD    Family Medicine Video Visit Note  Juancarlos is being evaluated via a billable video visit.             Video Visit Consent     The patient has been notified of following:     \"This video visit will be conducted via a call between you and your physician/provider. We have found that certain health care needs can be provided without the need for an in-person physical exam.  This service lets us provide the care you need with a video conversation.  If a prescription is necessary we can send it directly to your pharmacy.  If lab work is needed we can place an order for that and you can then stop by our lab to have the test done at a later time.    Video visits are billed at different rates depending on your insurance coverage.  Please reach out to your insurance provider with any questions.    If during the course of the call the physician/provider feels a video visit is not appropriate, you will not be charged for this service.\"    Patient has given verbal consent for Video visit? Yes    How would you like to obtain your AVS? Carleehart    Patient " would like the video invitation sent by: Other e-mail: my chart    Will anyone else be joining your video visit? No       Video-Visit Details    Type of service:  Video Visit    Video Start Time: 11:20 AM  THIS is the time provider and patient connect.    Video End Time (time video stopped): 12:10 PM    Originating Location (pt. Location): Home    Distant Location (provider location):  [facility]     Mode of Communication:  Video Conference via Sp Coronado RD

## 2024-05-02 ENCOUNTER — TRANSFERRED RECORDS (OUTPATIENT)
Dept: MULTI SPECIALTY CLINIC | Facility: CLINIC | Age: 49
End: 2024-05-02

## 2024-08-09 ENCOUNTER — MYC MEDICAL ADVICE (OUTPATIENT)
Dept: FAMILY MEDICINE | Facility: CLINIC | Age: 49
End: 2024-08-09

## 2024-08-09 DIAGNOSIS — E78.5 DYSLIPIDEMIA: Chronic | ICD-10-CM

## 2024-08-12 RX ORDER — ROSUVASTATIN CALCIUM 20 MG/1
20 TABLET, COATED ORAL DAILY
Qty: 90 TABLET | Refills: 1 | Status: SHIPPED | OUTPATIENT
Start: 2024-08-12 | End: 2024-10-07

## 2024-08-12 NOTE — TELEPHONE ENCOUNTER
Medication requested: rosuvastatin (CRESTOR) 20 MG tablet   Last office visit: 4/23/24  [facility] appointments: 10/7/24  Medication last refilled: 8/28/23; 90 + 3 refills  Last qualifying labs:   Component      Latest Ref Rng 3/22/2024  12:29 PM   Cholesterol      <200 mg/dL 206 (H)    Triglycerides      <150 mg/dL 77    HDL Cholesterol      >=40 mg/dL 55    LDL Cholesterol Calculated      <=100 mg/dL 136 (H)    Non HDL Cholesterol      <130 mg/dL 151 (H)    Patient Fasting? No      Prescription approved per [facility] Refill Protocol.    Socrates CRUZ, RN  08/12/24 8:15 AM

## 2024-09-22 ENCOUNTER — HEALTH MAINTENANCE LETTER (OUTPATIENT)
Age: 49
End: 2024-09-22

## 2024-10-07 ENCOUNTER — OFFICE VISIT (OUTPATIENT)
Dept: FAMILY MEDICINE | Facility: CLINIC | Age: 49
End: 2024-10-07
Payer: COMMERCIAL

## 2024-10-07 VITALS
HEART RATE: 57 BPM | BODY MASS INDEX: 29.91 KG/M2 | TEMPERATURE: 97.8 F | OXYGEN SATURATION: 98 % | WEIGHT: 220.8 LBS | DIASTOLIC BLOOD PRESSURE: 82 MMHG | HEIGHT: 72 IN | RESPIRATION RATE: 17 BRPM | SYSTOLIC BLOOD PRESSURE: 128 MMHG

## 2024-10-07 DIAGNOSIS — Z12.5 SCREENING FOR PROSTATE CANCER: ICD-10-CM

## 2024-10-07 DIAGNOSIS — L98.9 SKIN LESION: ICD-10-CM

## 2024-10-07 DIAGNOSIS — F40.248 FEAR OF PUBLIC SPEAKING: ICD-10-CM

## 2024-10-07 DIAGNOSIS — I48.0 PAF (PAROXYSMAL ATRIAL FIBRILLATION) (H): ICD-10-CM

## 2024-10-07 DIAGNOSIS — R73.03 PREDIABETES: ICD-10-CM

## 2024-10-07 DIAGNOSIS — Z00.00 ROUTINE GENERAL MEDICAL EXAMINATION AT A HEALTH CARE FACILITY: Primary | ICD-10-CM

## 2024-10-07 DIAGNOSIS — Z23 INFLUENZA VACCINE NEEDED: ICD-10-CM

## 2024-10-07 DIAGNOSIS — E78.5 DYSLIPIDEMIA: Chronic | ICD-10-CM

## 2024-10-07 LAB
EST. AVERAGE GLUCOSE BLD GHB EST-MCNC: 108 MG/DL
HBA1C MFR BLD: 5.4 % (ref 0–5.6)

## 2024-10-07 PROCEDURE — 80061 LIPID PANEL: CPT | Performed by: INTERNAL MEDICINE

## 2024-10-07 PROCEDURE — G0103 PSA SCREENING: HCPCS | Performed by: INTERNAL MEDICINE

## 2024-10-07 RX ORDER — PROPRANOLOL HCL 20 MG
TABLET ORAL
Qty: 30 TABLET | Refills: 0 | Status: SHIPPED | OUTPATIENT
Start: 2024-10-07

## 2024-10-07 RX ORDER — ROSUVASTATIN CALCIUM 20 MG/1
20 TABLET, COATED ORAL DAILY
Qty: 90 TABLET | Refills: 3 | Status: SHIPPED | OUTPATIENT
Start: 2024-10-07

## 2024-10-07 RX ORDER — PROPRANOLOL HCL 20 MG
20 TABLET ORAL 3 TIMES DAILY
Qty: 30 TABLET | Refills: 0 | Status: SHIPPED | OUTPATIENT
Start: 2024-10-07 | End: 2024-10-07

## 2024-10-07 SDOH — HEALTH STABILITY: PHYSICAL HEALTH: ON AVERAGE, HOW MANY DAYS PER WEEK DO YOU ENGAGE IN MODERATE TO STRENUOUS EXERCISE (LIKE A BRISK WALK)?: 5 DAYS

## 2024-10-07 ASSESSMENT — SOCIAL DETERMINANTS OF HEALTH (SDOH): HOW OFTEN DO YOU GET TOGETHER WITH FRIENDS OR RELATIVES?: MORE THAN THREE TIMES A WEEK

## 2024-10-07 ASSESSMENT — PAIN SCALES - GENERAL: PAINLEVEL: NO PAIN (0)

## 2024-10-07 NOTE — PROGRESS NOTES
Juancarlos Causey is a 49 year old male with hx of paroxysmal atrial fibrillation, dyslipidemia, lower urinary tract symptoms, history of herniated disc, family history of colon cancer.  He is here for a general check up.  He is not fasting. He is up to date on eye exams and dental visits.     HCM  Advanced directive: None on File--gave info today  COVID/Influenza vaccine: will do Flu today  Other Vaccines: Hep B (2 of 3), PCV-20, and Zoster---hold on others  Colon cancer screening: last done 2022, repeat 2027  PSA: Completed 3/22/2024, hx of LUTS, no current symptoms     Diet: omnivore  Down 17 lb ,eating less carbs    Wt Readings from Last 4 Encounters:   10/07/24 100.2 kg (220 lb 12.8 oz)   04/23/24 107.5 kg (237 lb)   03/22/24 109.3 kg (241 lb)   07/03/23 106.6 kg (235 lb)     Body mass index is 30.24 kg/m .    LUTs  Sometimes has end stream drops of blood  Hydrates well, symptoms resolved  Pressure in lower pelvis  No current symptoms    Skin lesions  Dry oval patch on posterior right calf   Thickened raised scaly lesion on Left posterior thigh    Right calf injury  Gastroc muscle tight on right leg  Felt like a pulled muscle, with running  Had to stop running , 3 mos ago  Able to walk now, improving    Dyslipidemia  Hx of very high LDL  Siblings with hyperlipidemia, no family hx of ASCVD  Rosuvastatin 20mg daily  Not fasting today  Recent Labs   Lab Test 03/22/24  1229 07/03/23  0952 06/11/21  0832 02/03/20  0845   CHOL 206* 313* 266.0* 201.0*   HDL 55 54 54.0 53.0   * 236* 196.0* 128.0   TRIG 77 115 77.0 97.0   CHOLHDLRATIO  --   --  4.9 3.8     Atrial fibrillation  [place] Afib onset age 35 (2011), resolved spontaneously, normal Echo  Recurrence 11/2019, started on Eliquis  S/p ablation procedure 12/31/2019 with cardiology  On Eliquis post ablation x 3 mos  Cardiology recommended daily ASA 81mg after that point  Some extra beats with stress, ?short of breath,   No current ASA use, recommend 81mg  "daily    Prediabetes  Diet controlled  Lab Results   Component Value Date    A1C 5.7 03/22/2024    A1C 5.5 06/11/2021    A1C 5.4 04/23/2019    A1C 5.5 01/20/2017     PMH, PSH, FH, medications, allergies and immunizations are updated this visit.     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[employer]     HABITS:  Tob: never  ETOH: 0-1 per day/day wine  Calcium: 1/day, cheese, no MVI  Caffeine: 2/day  Exercise: biking regularly, walking     MALE ROS  Partner: spouse  ED: none  Contraception: vasectomy  BPH: nocturia x 1, hydrates before bed     Current Outpatient Medications   Medication Sig Dispense Refill    rosuvastatin (CRESTOR) 20 MG tablet Take 1 tablet (20 mg) by mouth daily 90 tablet 1     No Known Allergies        ROS  CONSTITUTIONAL:NEGATIVE for fever, chills, + concerted weight loss  INTEGUMENTARY/SKIN:skin lesions as above  EYES: NEGATIVE for vision changes or irritation  ENT/MOUTH: NEGATIVE for ear, mouth and throat problems  RESP:NEGATIVE for significant cough or SOB  CV: NEGATIVE for chest pain, palpitations, BERNSTEIN, orthopnea, PND  or peripheral edema  GI: NEGATIVE for nausea, abdominal pain, heartburn, or change in bowel habits  :NEGATIVE for frequency, dysuria, or hematuria  MUSCULOSKELETAL:NEGATIVE for significant arthralgias or myalgia, As above  NEURO: NEGATIVE for weakness, dizziness or paresthesias  ENDOCRINE: NEGATIVE for polyuria/dipsia,  temperature intolerance, skin/hair changes  HEME/ALLERGY/IMMUNE: NEGATIVE for bleeding problems  PSYCHIATRIC: NEGATIVE for changes in mood or affect     EXAM  /85 (BP Location: Right arm, Patient Position: Sitting, Cuff Size: Adult Regular)   Pulse 57   Temp 97.8  F (36.6  C) (Skin)   Resp 17   Ht 1.82 m (5' 11.65\")   Wt 100.2 kg (220 lb 12.8 oz)   SpO2 98%   BMI 30.24 kg/m      GENERAL APPEARANCE:   EYES: PERRL, EOMI, conjunctiva clear  HENT: TM normal bilaterally. Nose and mouth without lesions  NECK: no adenopathy, " thyroid normal to palpation  RESP: lungs clear to auscultation bilaterally   Axillae: no palpable axillary masses or adenopathy  CV: regular rate and rhythm, normal S1 S2, no murmur, no carotid bruits  ABDOMEN: soft, nontender, without HSM or masses. Bowel sounds normal  MS: extremities normal- no gross deformities noted, no tender, hot or swollen joints.  SKIN: no suspicious lesions or rashes  NEURO: Normal strength and tone, sensory exam grossly normal, DTR normoreflexive in upper and lower extremities  PSYCH: mentation appears normal. and affect normal/bright.  EXT: no peripheral edema, pedal pulses palpable     Assessment:  (Z00.00) Routine general medical examination at a health care facility  (primary encounter diagnosis)  Comment: 49 year old male in good health  Plan: Today we discussed ways to maintain a healthy lifestyle with sensible eating, regular exercise and self cares. We dicussed calcium and Vitamin D intake, vaccinations and preventive health screens.  Will return for nurse visit for COVID vaccine.      (R73.03) Prediabetes  Comment: concerted weight loss 17 pounds.. A1c in nondiabetic range  Lab Results   Component Value Date    A1C 5.4 10/07/2024    A1C 5.7 03/22/2024   Plan: Hemoglobin A1c        Continue lifestyle efforts    (E78.5) Dyslipidemia  Comment: on rosuvastatin  Plan: Lipid Profile, rosuvastatin (CRESTOR) 20 MG         tablet        Refilled medication for one year    (Z12.5) Screening for prostate cancer  Comment: no current symptoms  Plan: Prostate spec antigen screen        Check PSA today, notify me if you develops LUTs symptoms    (Z23) Influenza vaccine needed  Comment: routine vaccine requested  Plan: INFLUENZA VACCINE,SPLIT         VIRUS,TRIVALENT,PF(FLUZONE)        given    (I48.0) PAF (paroxysmal atrial fibrillation) (H)  Comment: first onset 2011, had recurrence 2019 and underwent ablation. No current symptoms  Plan: recommend adding baby aspirin    (L98.9) Skin  lesion  Comment: benign SK on posterior right calf, thickened discreet raised lesion on posterior left thigh, DDX is verrucous lesion vs squamous cell ca  Plan: Adult Dermatology  Referral        Recommend consultation with dermatologist [facility]    (F40.847) Fear of public speaking  Comment: some palpitations prior to  public speaking  Plan: propranolol (INDERAL) 20 MG tablet,         DISCONTINUED: propranolol (INDERAL) 20 MG         tablet        Discussed use of propranolol prior to public speaking    Татьяна Crews MD  Internal Medicine/Pediatrics

## 2024-10-07 NOTE — PATIENT INSTRUCTIONS
Calcium 1200mg   Vitamin D 1000 international unit(s)  - in winter     Recommend baby aspirin 81mg daily      Patient Education   Preventive Care Advice   This is general advice given by our system to help you stay healthy. However, your care team may have specific advice just for you. Please talk to your care team about your preventive care needs.  Nutrition  Eat 5 or more servings of fruits and vegetables each day.  Try wheat bread, brown rice and whole grain pasta (instead of white bread, rice, and pasta).  Get enough calcium and vitamin D. Check the label on foods and aim for 100% of the RDA (recommended daily allowance).  Lifestyle  Exercise at least 150 minutes each week  (30 minutes a day, 5 days a week).  Do muscle strengthening activities 2 days a week. These help control your weight and prevent disease.  No smoking.  Wear sunscreen to prevent skin cancer.  Have a dental exam and cleaning every 6 months.  Yearly exams  See your health care team every year to talk about:  Any changes in your health.  Any medicines your care team has prescribed.  Preventive care, family planning, and ways to prevent chronic diseases.  Shots (vaccines)   HPV shots (up to age 26), if you've never had them before.  Hepatitis B shots (up to age 59), if you've never had them before.  COVID-19 shot: Get this shot when it's due.  Flu shot: Get a flu shot every year.  Tetanus shot: Get a tetanus shot every 10 years.  Pneumococcal, hepatitis A, and RSV shots: Ask your care team if you need these based on your risk.  Shingles shot (for age 50 and up)  General health tests  Diabetes screening:  Starting at age 35, Get screened for diabetes at least every 3 years.  If you are younger than age 35, ask your care team if you should be screened for diabetes.  Cholesterol test: At age 39, start having a cholesterol test every 5 years, or more often if advised.  Bone density scan (DEXA): At age 50, ask your care team if you should have this  scan for osteoporosis (brittle bones).  Hepatitis C: Get tested at least once in your life.  STIs (sexually transmitted infections)  Before age 24: Ask your care team if you should be screened for STIs.  After age 24: Get screened for STIs if you're at risk. You are at risk for STIs (including HIV) if:  You are sexually active with more than one person.  You don't use condoms every time.  You or a partner was diagnosed with a sexually transmitted infection.  If you are at risk for HIV, ask about PrEP medicine to prevent HIV.  Get tested for HIV at least once in your life, whether you are at risk for HIV or not.  Cancer screening tests  Cervical cancer screening: If you have a cervix, begin getting regular cervical cancer screening tests starting at age 21.  Breast cancer scan (mammogram): If you've ever had breasts, begin having regular mammograms starting at age 40. This is a scan to check for breast cancer.  Colon cancer screening: It is important to start screening for colon cancer at age 45.  Have a colonoscopy test every 10 years (or more often if you're at risk) Or, ask your provider about stool tests like a FIT test every year or Cologuard test every 3 years.  To learn more about your testing options, visit:   .  For help making a decision, visit:   https://bit.ly/lj92933.  Prostate cancer screening test: If you have a prostate, ask your care team if a prostate cancer screening test (PSA) at age 55 is right for you.  Lung cancer screening: If you are a current or former smoker ages 50 to 80, ask your care team if ongoing lung cancer screenings are right for you.  For informational purposes only. Not to replace the advice of your health care provider. Copyright   2023 [employer]. All rights reserved. Clinically reviewed by the [facility] Transitions Program. [employer] 260405 - REV 01/24.

## 2024-10-08 LAB
CHOLEST SERPL-MCNC: 201 MG/DL
FASTING STATUS PATIENT QL REPORTED: NO
HDLC SERPL-MCNC: 48 MG/DL
LDLC SERPL CALC-MCNC: 134 MG/DL
NONHDLC SERPL-MCNC: 153 MG/DL
PSA SERPL DL<=0.01 NG/ML-MCNC: 0.84 NG/ML (ref 0–3.5)
TRIGL SERPL-MCNC: 95 MG/DL

## 2024-11-02 DIAGNOSIS — F40.248 FEAR OF PUBLIC SPEAKING: ICD-10-CM

## 2024-11-04 NOTE — TELEPHONE ENCOUNTER
Medication requested: propranolol (INDERAL) 20 MG tablet   Last office visit: 10/07/2024  [facility] appointments: none  Medication last refilled: 10/7/2024; 30 tabs + 0 refills  Last qualifying labs:     BP Readings from Last 3 Encounters:   10/07/24 128/82   04/23/24 131/85   03/22/24 139/87     MC message sent to pt to see if he requested this refill.    NANCY Nguyen, RN  11/04/24, 2:27 PM

## 2024-11-06 RX ORDER — PROPRANOLOL HCL 20 MG
TABLET ORAL
Qty: 90 TABLET | Refills: 1 | OUTPATIENT
Start: 2024-11-06

## 2024-11-12 ENCOUNTER — TRANSFERRED RECORDS (OUTPATIENT)
Dept: HEALTH INFORMATION MANAGEMENT | Facility: CLINIC | Age: 49
End: 2024-11-12
Payer: COMMERCIAL

## 2024-11-14 ENCOUNTER — PATIENT OUTREACH (OUTPATIENT)
Dept: CARE COORDINATION | Facility: CLINIC | Age: 49
End: 2024-11-14
Payer: COMMERCIAL

## 2024-11-18 NOTE — PROGRESS NOTES
"SPORTS MEDICINE CLINIC NEW PATIENT VISIT    REFERRAL SOURCE: Татьяна Crews MD    PATIENT'S GOAL FOR APPOINTMENT: \"to find out what's wrong\"    HISTORY OF PRESENT ILLNESS  Juancarlos Causey is a 49 year old male presenting as a new patient with right knee pain    When did problem start?/Trauma associated with onset?:  - 1 month ago without injury   - No previous right knee, hip, ankle foot injury.     Location & description of pain:  - right patellofemoral joint, feels underneath  - crepitus/cracking/pain    Exacerbating factors:   - running    Remitting factors:  - advil initially    Previous Treatments:  -Medications: advil and tylenol PRN  -Rehabilitation: X  -Durable Medical Equipment: X  -Injections: X  -Modalities: ice  -Other Providers seen: X    Sports, Hobbies, Employment:  - enjoys biking and running.   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     Area of Problem  11/20/24  Date 2 Date 3 Date 4 Date 5   Function Ability in last week - % of Baseline (0 is worst & 100 is best)    50%*       Sport/Activity Ability in last week - % of Baseline (0 is worst & 100 is best)    **       Pain Level in the last week (0 is best & 10 is worst)  3/10       *standing up from seated hurts, adjusting at night time, after 20 minutes of walking to work  **does okay with biking but running is significantly painful    Additional Information of consideration:  -Poor Balance?None  -Numbness/paresthesias in extremities?None    MEDICATIONS    Current Outpatient Medications:     propranolol (INDERAL) 20 MG tablet, Take 30 min before speaking engagement, Disp: 30 tablet, Rfl: 0    rosuvastatin (CRESTOR) 20 MG tablet, Take 1 tablet (20 mg) by mouth daily., Disp: 90 " tablet, Rfl: 3    ALLERGIES  No Known Allergies    PAST MEDICAL HISTORY  Past Medical History:   Diagnosis Date    Atrial fibrillation (H)     Dyslipidemia     Hyperlipidemia     Paroxysmal atrial fibrillation (H)     Perianal abscess 05/20/2023       PAST SURGICAL HISTORY  Past Surgical History:   Procedure Laterality Date    ABCESS DRAINAGE  05/2023    Perianal    CARDIOVERSION  11/12/2019    EP ABLATION N/A 12/31/2019    EP ABLATION AFLUTTER  12/31/2019    Procedure: EP Ablation Atrial Flutter;  Surgeon: Kal Hoover MD;  Location: [facility];  Service: Cardiology    EP ABLATION PVI Left 12/31/2019    Procedure: EP Ablation PVI;  Surgeon: Kal Hoover MD;  Location: [facility];  Service: Cardiology    HC LAP,INGUINAL HERNIA REPR,INITIAL Bilateral     8 months    HC TOOTH EXTRACTION W/FORCEP      HERNIA REPAIR Bilateral        SOCIAL HISTORY  Social History     Socioeconomic History    Marital status:      Spouse name: Not on file    Number of children: Not on file    Years of education: Not on file    Highest education level: Not on file   Occupational History    Not on file   Tobacco Use    Smoking status: Never    Smokeless tobacco: Never   Vaping Use    Vaping status: Never Used   Substance and Sexual Activity    Alcohol use: Yes     Alcohol/week: 21.0 standard drinks of alcohol     Types: 21 Glasses of wine per week     Comment: 3 drinks a day    Drug use: Not Currently    Sexual activity: Yes     Partners: Female   Other Topics Concern    Not on file   Social History Narrative    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[place] [employer]    Lives with wife (professor at [employer]) and two kids (17 and 13 as of 07/2023)     Social Drivers of Health     Financial Resource Strain: Low Risk  (10/7/2024)    Financial Resource Strain     Within the past 12 months, have you or your family members you live with been unable to  get utilities (heat, electricity) when it was really needed?: No   Food Insecurity: Low Risk  (10/7/2024)    Food Insecurity     Within the past 12 months, did you worry that your food would run out before you got money to buy more?: No     Within the past 12 months, did the food you bought just not last and you didn t have money to get more?: No   Transportation Needs: Low Risk  (10/7/2024)    Transportation Needs     Within the past 12 months, has lack of transportation kept you from medical appointments, getting your medicines, non-medical meetings or appointments, work, or from getting things that you need?: No   Physical Activity: Unknown (10/7/2024)    Exercise Vital Sign     Days of Exercise per Week: 5 days     Minutes of Exercise per Session: Not on file   Stress: No Stress Concern Present (10/7/2024)    [employer] [place] [employer] - Occupational Stress Questionnaire     Feeling of Stress : Only a little   Social Connections: Unknown (10/7/2024)    Social Connection and Isolation Panel [NHANES]     Frequency of Communication with Friends and Family: Not on file     Frequency of Social Gatherings with Friends and Family: More than three times a week     Attends Bahai Services: Not on file     Active Member of Clubs or Organizations: Not on file     Attends Club or Organization Meetings: Not on file     Marital Status: Not on file   Interpersonal Safety: Low Risk  (10/7/2024)    Interpersonal Safety     Do you feel physically and emotionally safe where you currently live?: Yes     Within the past 12 months, have you been hit, slapped, kicked or otherwise physically hurt by someone?: No     Within the past 12 months, have you been humiliated or emotionally abused in other ways by your partner or ex-partner?: No   Housing Stability: Low Risk  (10/7/2024)    Housing Stability     Do you have housing? : Yes     Are you worried about losing your housing?: No       FAMILY HISTORY  Family History    Problem Relation Age of Onset    Colon Cancer Mother 45    Breast Cancer Mother 82    Atrial fibrillation Father     Urinary tract infection Father         recurrent    Diabetes Type 2  Father     Hyperlipidemia Sister     No Known Problems Sister     No Known Problems Brother     Hyperlipidemia Maternal Grandmother     Arthritis Maternal Grandmother     Prostate Cancer No family hx of     Coronary Artery Disease No family hx of     Cerebrovascular Disease No family hx of        REVIEW OF SYSTEMS  Complete 12 system Review of Systems performed and was negative except for HPI.    VITALS  There were no vitals filed for this visit.    PHYSICAL EXAMINATION   General: Age appropriate appearing, no acute distress  HEENT: normocephalic, atraumatic, sclera non-icteric  Skin: No open skin lesion noted in visible areas.  Respiratory: Non labored breathing, No wheezes.  Cardiac/Vessels: No edema, cyanosis, clubbing noted in all extremities.  Lymph: No palpable lymph node swelling noted around the affected area.  Mental: There was no signs of aberrant behaviors noted. Patient was pleasant throughout the encounter.    Functional Movements: mildly antalgic gait appreciated with minimal stance phase on the right side. Able to complete double leg squat, right more than left dynamic valgus with right knee dynamic instability on single leg squat    RIGHT KNEE:   Inspection: No deformities, atrophy, effusion, warmth   Palpation: No TTP QT, PT, medial/lateral joint lines  Range of Motion (Estimated, Active unless otherwise noted):Flexion 150 degrees, extension to neutral.   Special Testing:   Testing: The following special tests were negative: Valgus stress test, Varus stress test, Lachman's test, Anterior drawer test, Posterior drawer test, Yarely's test, Thessaly test, Patellofemoral grind test    IMAGING STUDIES:  11/20/2024: preserved joint spaces with Insall-Salvati ratio 1.3 consistent with patella belen.  Official radiology  read pending.  I personally reviewed these images and shared the findings with the patient.    IMPRESSION  Juancarlos Casuey is a very pleasant 49 year old male [facility] /fungal  who enjoys running, biking and skiing, and who presents today with right knee pain that seems related to patellofemoral pain syndrome supported by clinical findings including the patellofemoral location of his pain, dynamic valgus on single leg squat and patella belen.  His intra and maryann-articular ligaments are stable on clinical testing.    PLAN  The following was discussed with the patient:  Activity Modification: Activity as tolerated  Imaging/Tests: Right knee radiographs reviewed as noted above  Rehabilitation: Physical therapy recommended, referral placed  Orthotics/Bracing: None recommended  Medication: May use over the counter Tylenol or Ibuprofen as needed for pain  Interventions: None at this time  Follow-up Plan: 8-12 weeks  Resources Provided: Written and verbal information detailing above findings and plan provided including after visit summary.    They were encouraged to message me on [employer] whenever they needed.    The patient was in agreement with this plan. All questions were answered to the best of my ability.    Total time (face-to-face and non-face-to-face) spent on today's visit was 45 minutes. This included preparation for the visit (i.e. reviewing test results), performance of a medically appropriate history and examination, placing orders for medications, tests or other procedures, and discussing the plan of care with the patient. This time is exclusive of procedures performed and time spent teaching.     Tere Corbett MD, [facility]  Sports Medicine Attending Physician  Department of Physical Medicine & Rehabilitation

## 2024-11-20 ENCOUNTER — OFFICE VISIT (OUTPATIENT)
Dept: ORTHOPEDICS | Facility: CLINIC | Age: 49
End: 2024-11-20
Attending: INTERNAL MEDICINE
Payer: COMMERCIAL

## 2024-11-20 ENCOUNTER — ANCILLARY PROCEDURE (OUTPATIENT)
Dept: GENERAL RADIOLOGY | Facility: CLINIC | Age: 49
End: 2024-11-20
Attending: STUDENT IN AN ORGANIZED HEALTH CARE EDUCATION/TRAINING PROGRAM
Payer: COMMERCIAL

## 2024-11-20 DIAGNOSIS — M25.561 ACUTE PAIN OF RIGHT KNEE: ICD-10-CM

## 2024-11-20 DIAGNOSIS — M22.2X1 PATELLOFEMORAL PAIN SYNDROME OF RIGHT KNEE: Primary | ICD-10-CM

## 2024-11-20 PROBLEM — F10.20 ALCOHOL DEPENDENCE (H): Status: ACTIVE | Noted: 2024-11-20

## 2024-11-20 PROCEDURE — 73562 X-RAY EXAM OF KNEE 3: CPT | Mod: TC | Performed by: RADIOLOGY

## 2024-11-20 NOTE — LETTER
"11/20/2024      Juancarlos Causey  [address]  [place] [address]      Dear Colleague,    Thank you for referring your patient, Juancarlos Causey, to the [facility] [place]. Please see a copy of my visit note below.    SPORTS MEDICINE CLINIC NEW PATIENT VISIT    REFERRAL SOURCE: Татьяна Crews MD    PATIENT'S GOAL FOR APPOINTMENT: \"to find out what's wrong\"    HISTORY OF PRESENT ILLNESS  Juancarlos Causey is a 49 year old male presenting as a new patient with right knee pain    When did problem start?/Trauma associated with onset?:  - 1 month ago without injury   - No previous right knee, hip, ankle foot injury.     Location & description of pain:  - right patellofemoral joint, feels underneath  - crepitus/cracking/pain    Exacerbating factors:   - running    Remitting factors:  - advil initially    Previous Treatments:  -Medications: advil and tylenol PRN  -Rehabilitation: X  -Durable Medical Equipment: X  -Injections: X  -Modalities: ice  -Other Providers seen: X    Sports, Hobbies, Employment:  - enjoys biking and running.   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     Area of Problem  11/20/24  Date 2 Date 3 Date 4 Date 5   Function Ability in last week - % of Baseline (0 is worst & 100 is best)    50%*       Sport/Activity Ability in last week - % of Baseline (0 is worst & 100 is best)    **       Pain Level in the last week (0 is best & 10 is worst)  3/10       *standing up from seated hurts, adjusting at night time, after 20 minutes of walking to work  **does okay with biking but running is significantly painful    Additional Information of consideration:  -Poor " Balance?None  -Numbness/paresthesias in extremities?None    MEDICATIONS    Current Outpatient Medications:      propranolol (INDERAL) 20 MG tablet, Take 30 min before speaking engagement, Disp: 30 tablet, Rfl: 0     rosuvastatin (CRESTOR) 20 MG tablet, Take 1 tablet (20 mg) by mouth daily., Disp: 90 tablet, Rfl: 3    ALLERGIES  No Known Allergies    PAST MEDICAL HISTORY  Past Medical History:   Diagnosis Date     Atrial fibrillation (H)      Dyslipidemia      Hyperlipidemia      Paroxysmal atrial fibrillation (H)      Perianal abscess 05/20/2023       PAST SURGICAL HISTORY  Past Surgical History:   Procedure Laterality Date     ABCESS DRAINAGE  05/2023    Perianal     CARDIOVERSION  11/12/2019     EP ABLATION N/A 12/31/2019     EP ABLATION AFLUTTER  12/31/2019    Procedure: EP Ablation Atrial Flutter;  Surgeon: Kal Hoover MD;  Location: [facility];  Service: Cardiology     EP ABLATION PVI Left 12/31/2019    Procedure: EP Ablation PVI;  Surgeon: Kal Hoover MD;  Location: [facility];  Service: Cardiology     HC LAP,INGUINAL HERNIA REPR,INITIAL Bilateral     8 months     HC TOOTH EXTRACTION W/FORCEP       HERNIA REPAIR Bilateral        SOCIAL HISTORY  Social History     Socioeconomic History     Marital status:      Spouse name: Not on file     Number of children: Not on file     Years of education: Not on file     Highest education level: Not on file   Occupational History     Not on file   Tobacco Use     Smoking status: Never     Smokeless tobacco: Never   Vaping Use     Vaping status: Never Used   Substance and Sexual Activity     Alcohol use: Yes     Alcohol/week: 21.0 standard drinks of alcohol     Types: 21 Glasses of wine per week     Comment: 3 drinks a day     Drug use: Not Currently     Sexual activity: Yes     Partners: Female   Other Topics Concern     Not on file   Social History Ton[place]    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    Directs the [employer]    Lives with wife (professor at [employer]) and two kids (17 and 13 as of 07/2023)     Social Drivers of Health     Financial Resource Strain: Low Risk  (10/7/2024)    Financial Resource Strain      Within the past 12 months, have you or your family members you live with been unable to get utilities (heat, electricity) when it was really needed?: No   Food Insecurity: Low Risk  (10/7/2024)    Food Insecurity      Within the past 12 months, did you worry that your food would run out before you got money to buy more?: No      Within the past 12 months, did the food you bought just not last and you didn t have money to get more?: No   Transportation Needs: Low Risk  (10/7/2024)    Transportation Needs      Within the past 12 months, has lack of transportation kept you from medical appointments, getting your medicines, non-medical meetings or appointments, work, or from getting things that you need?: No   Physical Activity: Unknown (10/7/2024)    Exercise Vital Sign      Days of Exercise per Week: 5 days      Minutes of Exercise per Session: Not on file   Stress: No Stress Concern Present (10/7/2024)    [employer] [place] [employer] - Occupational Stress Questionnaire      Feeling of Stress : Only a little   Social Connections: Unknown (10/7/2024)    Social Connection and Isolation Panel [NHANES]      Frequency of Communication with Friends and Family: Not on file      Frequency of Social Gatherings with Friends and Family: More than three times a week      Attends Caodaism Services: Not on file      Active Member of Clubs or Organizations: Not on file      Attends Club or Organization Meetings: Not on file      Marital Status: Not on file   Interpersonal Safety: Low Risk  (10/7/2024)    Interpersonal Safety      Do you feel physically and emotionally safe where you currently live?: Yes      Within the past 12 months, have you been hit, slapped, kicked or otherwise  physically hurt by someone?: No      Within the past 12 months, have you been humiliated or emotionally abused in other ways by your partner or ex-partner?: No   Housing Stability: Low Risk  (10/7/2024)    Housing Stability      Do you have housing? : Yes      Are you worried about losing your housing?: No       FAMILY HISTORY  Family History   Problem Relation Age of Onset     Colon Cancer Mother 45     Breast Cancer Mother 82     Atrial fibrillation Father      Urinary tract infection Father         recurrent     Diabetes Type 2  Father      Hyperlipidemia Sister      No Known Problems Sister      No Known Problems Brother      Hyperlipidemia Maternal Grandmother      Arthritis Maternal Grandmother      Prostate Cancer No family hx of      Coronary Artery Disease No family hx of      Cerebrovascular Disease No family hx of        REVIEW OF SYSTEMS  Complete 12 system Review of Systems performed and was negative except for HPI.    VITALS  There were no vitals filed for this visit.    PHYSICAL EXAMINATION   General: Age appropriate appearing, no acute distress  HEENT: normocephalic, atraumatic, sclera non-icteric  Skin: No open skin lesion noted in visible areas.  Respiratory: Non labored breathing, No wheezes.  Cardiac/Vessels: No edema, cyanosis, clubbing noted in all extremities.  Lymph: No palpable lymph node swelling noted around the affected area.  Mental: There was no signs of aberrant behaviors noted. Patient was pleasant throughout the encounter.    Functional Movements: mildly antalgic gait appreciated with minimal stance phase on the right side. Able to complete double leg squat, right more than left dynamic valgus with right knee dynamic instability on single leg squat    RIGHT KNEE:   Inspection: No deformities, atrophy, effusion, warmth   Palpation: No TTP QT, PT, medial/lateral joint lines  Range of Motion (Estimated, Active unless otherwise noted):Flexion 150 degrees, extension to neutral.   Special  Testing:   Testing: The following special tests were negative: Valgus stress test, Varus stress test, Lachman's test, Anterior drawer test, Posterior drawer test, Yarely's test, Thessaly test, Patellofemoral grind test    IMAGING STUDIES:  11/20/2024: preserved joint spaces with Insall-Salvati ratio 1.3 consistent with patella belen.  Official radiology read pending.  I personally reviewed these images and shared the findings with the patient.    IMPRESSION  Juancarlos Causey is a very pleasant 49 year old male [facility] /fungal  who enjoys running, biking and skiing, and who presents today with right knee pain that seems related to patellofemoral pain syndrome supported by clinical findings including the patellofemoral location of his pain, dynamic valgus on single leg squat and patella belen.  His intra and maryann-articular ligaments are stable on clinical testing.    PLAN  The following was discussed with the patient:  Activity Modification: Activity as tolerated  Imaging/Tests: Right knee radiographs reviewed as noted above  Rehabilitation: Physical therapy recommended, referral placed  Orthotics/Bracing: None recommended  Medication: May use over the counter Tylenol or Ibuprofen as needed for pain  Interventions: None at this time  Follow-up Plan: 8-12 weeks  Resources Provided: Written and verbal information detailing above findings and plan provided including after visit summary.    They were encouraged to message me on [employer] whenever they needed.    The patient was in agreement with this plan. All questions were answered to the best of my ability.    Total time (face-to-face and non-face-to-face) spent on today's visit was 45 minutes. This included preparation for the visit (i.e. reviewing test results), performance of a medically appropriate history and examination, placing orders for medications, tests or other procedures, and discussing the plan  of care with the patient. This time is exclusive of procedures performed and time spent teaching.     Tere Corbett MD, [facility]  Sports Medicine Attending Physician  Department of Physical Medicine & Rehabilitation         Again, thank you for allowing me to participate in the care of your patient.        Sincerely,        Tere Corbett MD

## 2024-11-20 NOTE — PATIENT INSTRUCTIONS
Juancarlos Causey, It was nice to see you in our office today.      DIAGNOSIS:  1. Patellofemoral pain syndrome of right knee    2. Acute pain of right knee        INSTRUCTIONS FOR FOLLOW-UP CARE:  Activity Status Recommended:Activity as tolerated  Imaging ordered: Right knee radiographs reviewed  Rehabilitation: Physical therapy recommended, referral placed  Bracing/Orthotics: None at this time  US guided injections: None at this time  Medications: May use over the counter tylenol or ibuprofen as needed for pain  Follow up: 8-12 weeks      CLINIC LOCATIONS:     [place] [address], [address] TRIAGE LINE: [phone]   [place] [address] APPOINTMENTS: [phone]   (Monday & Friday) RADIOLOGY: [phone]    MRI/CT [phone]   [place] [facility]: [phone] [address] BILLING QUESTIONS: [phone]   [place] [address] FAX: [phone]   (Tuesday & Wednesday)        Thank you for choosing [facility]!    If you have any questions, please do not hesitate to reach out on [employer] or Call [phone] and ask for my team.    Tere Corbett MD, [facility]

## 2024-12-11 PROBLEM — M25.561 ACUTE PAIN OF RIGHT KNEE: Status: ACTIVE | Noted: 2024-12-11

## 2024-12-11 PROBLEM — M22.2X1 PATELLOFEMORAL PAIN SYNDROME OF RIGHT KNEE: Status: ACTIVE | Noted: 2024-12-11

## 2024-12-24 ENCOUNTER — THERAPY VISIT (OUTPATIENT)
Dept: PHYSICAL THERAPY | Facility: CLINIC | Age: 49
End: 2024-12-24
Payer: COMMERCIAL

## 2024-12-24 DIAGNOSIS — M25.561 ACUTE PAIN OF RIGHT KNEE: Primary | ICD-10-CM

## 2024-12-24 PROCEDURE — 97110 THERAPEUTIC EXERCISES: CPT | Mod: GP | Performed by: PHYSICAL THERAPIST

## 2024-12-24 PROCEDURE — 97140 MANUAL THERAPY 1/> REGIONS: CPT | Mod: GP | Performed by: PHYSICAL THERAPIST

## 2025-01-16 ENCOUNTER — OFFICE VISIT (OUTPATIENT)
Dept: ORTHOPEDICS | Facility: CLINIC | Age: 50
End: 2025-01-16
Attending: STUDENT IN AN ORGANIZED HEALTH CARE EDUCATION/TRAINING PROGRAM
Payer: COMMERCIAL

## 2025-01-16 ENCOUNTER — TELEPHONE (OUTPATIENT)
Dept: ORTHOPEDICS | Facility: CLINIC | Age: 50
End: 2025-01-16

## 2025-01-16 VITALS — BODY MASS INDEX: 30.13 KG/M2 | WEIGHT: 220 LBS

## 2025-01-16 DIAGNOSIS — M25.361 KNEE INSTABILITY, RIGHT: Primary | ICD-10-CM

## 2025-01-16 DIAGNOSIS — M25.561 ACUTE PAIN OF RIGHT KNEE: ICD-10-CM

## 2025-01-16 DIAGNOSIS — M22.2X1 PATELLOFEMORAL PAIN SYNDROME OF RIGHT KNEE: ICD-10-CM

## 2025-01-16 ASSESSMENT — PAIN SCALES - GENERAL: PAINLEVEL_OUTOF10: MODERATE PAIN (5)

## 2025-01-16 NOTE — TELEPHONE ENCOUNTER
Physical therapy referral faxed to Paola Longoria at [facility].     Fax# [phone]    KRYS Dumont

## 2025-01-16 NOTE — PROGRESS NOTES
SPORTS MEDICINE CLINIC FOLLOW-UP PATIENT VISIT    HISTORY OF PRESENT ILLNESS  Juancarlos Causey is a very pleasant 49 year old male [facility] /fungal  who enjoys running, biking and skiing, and who presents today for follow-up of right knee pain that seems related to patellofemoral pain syndrome.    Previous Visit (11/20/2024)   At last visit, knee radiographs reviewed, physical therapy referral placed.    Today (1/16/2025)  Since last visit, patient states that the knee got better with PT, but when he started running, he felt a set back. Has only ran once. Knee pain is worse than it was before starting PT. Has been walking over 8,000 steps a day and continuing HEP.     Previous Treatments:  -Medications: advil and tylenol PRN  -Rehabilitation: X  -Durable Medical Equipment: X  -Injections: X  -Modalities: ice  -Other Providers seen: X    Sports, Hobbies, Employment:  - enjoys biking and running.   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     Area of Problem  11/20/24 1/16/2025 Date 3 Date 4 Date 5   Function Ability in last week - % of Baseline (0 is worst & 100 is best)    50%*       Sport/Activity Ability in last week - % of Baseline (0 is worst & 100 is best)    **       Pain Level in the last week (0 is best & 10 is worst)  3/10        *standing up from seated hurts, adjusting at night time, after 20 minutes of walking to work  **does okay with biking but running is significantly painful    Additional Information of consideration:  -Poor Balance?None  -Numbness/paresthesias in extremities?None    MEDICATIONS    Current Outpatient Medications:     propranolol (INDERAL) 20 MG tablet, Take 30 min before speaking  engagement, Disp: 30 tablet, Rfl: 0    rosuvastatin (CRESTOR) 20 MG tablet, Take 1 tablet (20 mg) by mouth daily., Disp: 90 tablet, Rfl: 3    ALLERGIES  No Known Allergies    PAST MEDICAL HISTORY  Past Medical History:   Diagnosis Date    Atrial fibrillation (H)     Dyslipidemia     Hyperlipidemia     Paroxysmal atrial fibrillation (H)     Perianal abscess 05/20/2023       PAST SURGICAL HISTORY  Past Surgical History:   Procedure Laterality Date    ABCESS DRAINAGE  05/2023    Perianal    CARDIOVERSION  11/12/2019    EP ABLATION N/A 12/31/2019    EP ABLATION AFLUTTER  12/31/2019    Procedure: EP Ablation Atrial Flutter;  Surgeon: Kal Hoover MD;  Location: [facility];  Service: Cardiology    EP ABLATION PVI Left 12/31/2019    Procedure: EP Ablation PVI;  Surgeon: Kal Hoover MD;  Location: [facility];  Service: Cardiology    HC LAP,INGUINAL HERNIA REPR,INITIAL Bilateral     8 months    HC TOOTH EXTRACTION W/FORCEP      HERNIA REPAIR Bilateral        SOCIAL HISTORY  Social History     Tobacco Use    Smoking status: Never    Smokeless tobacco: Never   Vaping Use    Vaping status: Never Used   Substance Use Topics    Alcohol use: Yes     Alcohol/week: 21.0 standard drinks of alcohol     Types: 21 Glasses of wine per week     Comment: 3 drinks a day    Drug use: Not Currently       FAMILY HISTORY  Family History   Problem Relation Age of Onset    Colon Cancer Mother 45    Breast Cancer Mother 82    Atrial fibrillation Father     Urinary tract infection Father         recurrent    Diabetes Type 2  Father     Hyperlipidemia Sister     No Known Problems Sister     No Known Problems Brother     Hyperlipidemia Maternal Grandmother     Arthritis Maternal Grandmother     Prostate Cancer No family hx of     Coronary Artery Disease No family hx of     Cerebrovascular Disease No family hx of        REVIEW OF SYSTEMS  Complete 12 system Review of Systems performed and was negative except for  HPI.    VITALS  Vitals:    01/16/25 1450   Weight: 99.8 kg (220 lb)     PHYSICAL EXAMINATION   General: Age appropriate appearing, no acute distress  HEENT: normocephalic, atraumatic, sclera non-icteric  Skin: No open skin lesion noted in visible areas.  Respiratory: Non labored breathing, No wheezes.  Cardiac/Vessels: No edema, cyanosis, clubbing noted in all extremities.  Lymph: No palpable lymph node swelling noted around the affected area.  Mental: There was no signs of aberrant behaviors noted. Patient was pleasant throughout the encounter.    Functional Movements: mildly antalgic gait appreciated with minimal stance phase on the right side. Able to complete double leg squat, right more than left dynamic valgus with right knee dynamic instability on single leg squat    RIGHT KNEE:   Inspection: No deformities, atrophy, effusion, warmth   Palpation: TTP right interior patella area, no TTP QT, PT, medial/lateral joint lines  Range of Motion (Estimated, Active unless otherwise noted):Flexion 140 degrees, extension to neutral.   Special Testing: Positive [facility]'s test for stiffness.  The following special tests were negative: Valgus stress test, Varus stress test, Lachman's test, Anterior drawer test, Posterior drawer test.    IMAGING STUDIES:  11/20/2024: preserved joint spaces with Insall-Salvati ratio 1.3 consistent with patella belen.  Otherwise preserved joint spaces without fracture.  I personally reviewed these images and shared the findings with the patient.    IMPRESSION  Juancarlos Causey is a very pleasant 49 year old male [facility] /fungal  who enjoys running, biking and skiing, and who presents today for follow-up of right anterior knee pain due to patellofemoral pain syndrome supported clinically, with initial improvement in physical therapy and now worsening pain/stiffness and instability with attempted running.      Given worsening pain despite several  months of physical therapy, and new subjective instability and stiffness with positive Yarely's, additional imaging is indicated to evaluate intraarticular injury including possible meniscus injury or advanced chondromalacia.     PLAN  The following was discussed with the patient:  Activity Modification: Activity as tolerated  Imaging/Tests: Right knee radiographs reviewed as noted above  Rehabilitation: Physical therapy referral placed to Elton Cueto at [facility] per patient request  Orthotics/Bracing: None recommended  Medication: May use over the counter Tylenol or Ibuprofen as needed for pain  Interventions: None at this time  Follow-up Plan: 8-12 weeks  Resources Provided: Written and verbal information detailing above findings and plan provided including after visit summary.    They were encouraged to message me on [employer] whenever they needed.    The patient was in agreement with this plan. All questions were answered to the best of my ability.    Total time (face-to-face and non-face-to-face) spent on today's visit was 35 minutes. This included preparation for the visit (i.e. reviewing test results), performance of a medically appropriate history and examination, placing orders for medications, tests or other procedures, and discussing the plan of care with the patient. This time is exclusive of procedures performed and time spent teaching.     Tere Corbett MD, [facility]  Sports Medicine Attending Physician  Department of Physical Medicine & Rehabilitation

## 2025-01-16 NOTE — LETTER
1/16/2025      Juancarlos Causey  [address] [place] [address]      Dear Colleague,    Thank you for referring your patient, Juancarlos Causey, to the [facility] SPORTS MEDICINE [facility] [place]. Please see a copy of my visit note below.    SPORTS MEDICINE CLINIC FOLLOW-UP PATIENT VISIT    HISTORY OF PRESENT ILLNESS  Juancarlos Causey is a very pleasant 49 year old male [facility] /fungal  who enjoys running, biking and skiing, and who presents today for follow-up of right knee pain that seems related to patellofemoral pain syndrome.    Previous Visit (11/20/2024)   At last visit, knee radiographs reviewed, physical therapy referral placed.    Today (1/16/2025)  Since last visit, patient states that the knee got better with PT, but when he started running, he felt a set back. Has only ran once. Knee pain is worse than it was before starting PT. Has been walking over 8,000 steps a day and continuing HEP.     Previous Treatments:  -Medications: advil and tylenol PRN  -Rehabilitation: X  -Durable Medical Equipment: X  -Injections: X  -Modalities: ice  -Other Providers seen: X    Sports, Hobbies, Employment:  - enjoys biking and running.   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     Area of Problem  11/20/24 1/16/2025 Date 3 Date 4 Date 5   Function Ability in last week - % of Baseline (0 is worst & 100 is best)    50%*       Sport/Activity Ability in last week - % of Baseline (0 is worst & 100 is best)    **       Pain Level in the last week (0 is best & 10 is worst)  3/10        *standing up from seated hurts, adjusting at night time, after 20 minutes of walking to work  **does okay with biking  but running is significantly painful    Additional Information of consideration:  -Poor Balance?None  -Numbness/paresthesias in extremities?None    MEDICATIONS    Current Outpatient Medications:      propranolol (INDERAL) 20 MG tablet, Take 30 min before speaking engagement, Disp: 30 tablet, Rfl: 0     rosuvastatin (CRESTOR) 20 MG tablet, Take 1 tablet (20 mg) by mouth daily., Disp: 90 tablet, Rfl: 3    ALLERGIES  No Known Allergies    PAST MEDICAL HISTORY  Past Medical History:   Diagnosis Date     Atrial fibrillation (H)      Dyslipidemia      Hyperlipidemia      Paroxysmal atrial fibrillation (H)      Perianal abscess 05/20/2023       PAST SURGICAL HISTORY  Past Surgical History:   Procedure Laterality Date     ABCESS DRAINAGE  05/2023    Perianal     CARDIOVERSION  11/12/2019     EP ABLATION N/A 12/31/2019     EP ABLATION AFLUTTER  12/31/2019    Procedure: EP Ablation Atrial Flutter;  Surgeon: Kal Hoover MD;  Location: [facility];  Service: Cardiology     EP ABLATION PVI Left 12/31/2019    Procedure: EP Ablation PVI;  Surgeon: Kal Hoover MD;  Location: [facility];  Service: Cardiology     HC LAP,INGUINAL HERNIA REPR,INITIAL Bilateral     8 months     HC TOOTH EXTRACTION W/FORCEP       HERNIA REPAIR Bilateral        SOCIAL HISTORY  Social History     Tobacco Use     Smoking status: Never     Smokeless tobacco: Never   Vaping Use     Vaping status: Never Used   Substance Use Topics     Alcohol use: Yes     Alcohol/week: 21.0 standard drinks of alcohol     Types: 21 Glasses of wine per week     Comment: 3 drinks a day     Drug use: Not Currently       FAMILY HISTORY  Family History   Problem Relation Age of Onset     Colon Cancer Mother 45     Breast Cancer Mother 82     Atrial fibrillation Father      Urinary tract infection Father         recurrent     Diabetes Type 2  Father      Hyperlipidemia Sister      No Known Problems Sister      No Known Problems Brother      Hyperlipidemia  Maternal Grandmother      Arthritis Maternal Grandmother      Prostate Cancer No family hx of      Coronary Artery Disease No family hx of      Cerebrovascular Disease No family hx of        REVIEW OF SYSTEMS  Complete 12 system Review of Systems performed and was negative except for HPI.    VITALS  Vitals:    01/16/25 1450   Weight: 99.8 kg (220 lb)     PHYSICAL EXAMINATION   General: Age appropriate appearing, no acute distress  HEENT: normocephalic, atraumatic, sclera non-icteric  Skin: No open skin lesion noted in visible areas.  Respiratory: Non labored breathing, No wheezes.  Cardiac/Vessels: No edema, cyanosis, clubbing noted in all extremities.  Lymph: No palpable lymph node swelling noted around the affected area.  Mental: There was no signs of aberrant behaviors noted. Patient was pleasant throughout the encounter.    Functional Movements: mildly antalgic gait appreciated with minimal stance phase on the right side. Able to complete double leg squat, right more than left dynamic valgus with right knee dynamic instability on single leg squat    RIGHT KNEE:   Inspection: No deformities, atrophy, effusion, warmth   Palpation: TTP right interior patella area, no TTP QT, PT, medial/lateral joint lines  Range of Motion (Estimated, Active unless otherwise noted):Flexion 140 degrees, extension to neutral.   Special Testing: Positive Martha's test for stiffness.  The following special tests were negative: Valgus stress test, Varus stress test, Lachman's test, Anterior drawer test, Posterior drawer test.    IMAGING STUDIES:  11/20/2024: preserved joint spaces with Insall-Salvati ratio 1.3 consistent with patella belen.  Otherwise preserved joint spaces without fracture.  I personally reviewed these images and shared the findings with the patient.    IMPRESSION  Juancarlos Causey is a very pleasant 49 year old male [facility] /fungal  who enjoys running, biking and skiing,  and who presents today for follow-up of right anterior knee pain due to patellofemoral pain syndrome supported clinically, with initial improvement in physical therapy and now worsening pain/stiffness and instability with attempted running.      Given worsening pain despite several months of physical therapy, and new subjective instability and stiffness with positive Yarely's, additional imaging is indicated to evaluate intraarticular injury including possible meniscus injury or advanced chondromalacia.     PLAN  The following was discussed with the patient:  Activity Modification: Activity as tolerated  Imaging/Tests: Right knee radiographs reviewed as noted above  Rehabilitation: Physical therapy referral placed to Elton Cueto at [facility] per patient request  Orthotics/Bracing: None recommended  Medication: May use over the counter Tylenol or Ibuprofen as needed for pain  Interventions: None at this time  Follow-up Plan: 8-12 weeks  Resources Provided: Written and verbal information detailing above findings and plan provided including after visit summary.    They were encouraged to message me on [facility] whenever they needed.    The patient was in agreement with this plan. All questions were answered to the best of my ability.    Total time (face-to-face and non-face-to-face) spent on today's visit was 35 minutes. This included preparation for the visit (i.e. reviewing test results), performance of a medically appropriate history and examination, placing orders for medications, tests or other procedures, and discussing the plan of care with the patient. This time is exclusive of procedures performed and time spent teaching.     Tere Corbett MD, [facility]  Sports Medicine Attending Physician  Department of Physical Medicine & Rehabilitation         Again, thank you for allowing me to participate in the care of your patient.        Sincerely,        Tere Corbett MD    Electronically signed

## 2025-01-16 NOTE — PATIENT INSTRUCTIONS
Juancarlos Causey, It was nice to see you in our office today.      DIAGNOSIS:   1. Knee instability, right    2. Acute pain of right knee    3. Patellofemoral pain syndrome of right knee      INSTRUCTIONS FOR FOLLOW-UP CARE:  Activity Modification: Activity as tolerated, being guided by pain using following simple  Traffic Light System  as it relates to pain:  Green Light (0-3/10 pain): No increases in symptoms, you are OK to continue the activity, or perhaps increase load slightly.  Yellow light (4-6/10 pain): Minor increase in symptoms, but you can move normally within an hour of exercise, and pain reduces to normal within the next 24 hours. Proceed with caution, you can do minor bouts of loading, but too much will aggravate your symptoms and increase pain.  Red light (7-10/10 pain): Cease activity, as you have exceeded your tolerance. Generally, involves a significant increase in pain, which may not settle in the following 24 hours. Rest for 24-48 hours, allow symptoms to settle down, then begin gentle movement, and monitor your progression.  Imaging/Tests: MRI ordered  Rehabilitation: Physical Therapy referral sent to Elton paez with [facility]  Orthotics/Bracing: Hinged knee brace recommended with activity  Medication: --Topical Medication Options:  May use topical Voltaren gel up to 4 times daily as needed for pain, if symptoms persist,  For night time pain, consider nightly Salonpas patches nightly (on for 12 hours and off for 12 hours)                    --Oral medication options: Tylenol 500-1000mg (up to three times per day) and ibuprofen 600mg (up to three times per day) as needed for pain and swelling. Always take ibuprofen with food.     Follow-up Plan: After MRI    CLINIC LOCATIONS:     [facility]:  [phone] [address], [address] [facility] HEL[facility]: [phone]   [place] [address] TRIAGE LINE: [phone]   (Monday & Friday) APPOINTMENTS: [phone]    RADIOLOGY:  [phone]   [facility] [phone] [address] PHYSICAL & OCCUPATIONAL THERAPY: [phone]   [place] [address] BILLING QUESTIONS: [phone]   (Tuesday & Thursday) FAX: [phone]           Thank you for choosing [facility]!    If you have any questions, please do not hesitate to reach out on [employer] or Call [phone] and ask for my team.    Tere Corbett MD, CAM  [facility]

## 2025-01-25 ENCOUNTER — HOSPITAL ENCOUNTER (OUTPATIENT)
Dept: MRI IMAGING | Facility: HOSPITAL | Age: 50
Discharge: HOME | End: 2025-01-25
Attending: STUDENT IN AN ORGANIZED HEALTH CARE EDUCATION/TRAINING PROGRAM | Admitting: STUDENT IN AN ORGANIZED HEALTH CARE EDUCATION/TRAINING PROGRAM
Payer: COMMERCIAL

## 2025-01-25 DIAGNOSIS — M25.561 ACUTE PAIN OF RIGHT KNEE: ICD-10-CM

## 2025-01-25 DIAGNOSIS — M25.361 KNEE INSTABILITY, RIGHT: ICD-10-CM

## 2025-01-25 DIAGNOSIS — M22.2X1 PATELLOFEMORAL PAIN SYNDROME OF RIGHT KNEE: ICD-10-CM

## 2025-01-25 PROCEDURE — 73721 MRI JNT OF LWR EXTRE W/O DYE: CPT | Mod: RT

## 2025-01-28 ENCOUNTER — THERAPY VISIT (OUTPATIENT)
Dept: PHYSICAL THERAPY | Facility: CLINIC | Age: 50
End: 2025-01-28
Payer: COMMERCIAL

## 2025-01-28 DIAGNOSIS — M25.561 ACUTE PAIN OF RIGHT KNEE: Primary | ICD-10-CM

## 2025-01-28 PROCEDURE — 97110 THERAPEUTIC EXERCISES: CPT | Mod: GP | Performed by: PHYSICAL THERAPIST

## 2025-01-28 PROCEDURE — 97530 THERAPEUTIC ACTIVITIES: CPT | Mod: GP | Performed by: PHYSICAL THERAPIST

## 2025-02-03 ENCOUNTER — TRANSFERRED RECORDS (OUTPATIENT)
Dept: HEALTH INFORMATION MANAGEMENT | Facility: CLINIC | Age: 50
End: 2025-02-03
Payer: COMMERCIAL

## 2025-02-11 ENCOUNTER — OFFICE VISIT (OUTPATIENT)
Dept: ORTHOPEDICS | Facility: CLINIC | Age: 50
End: 2025-02-11
Attending: STUDENT IN AN ORGANIZED HEALTH CARE EDUCATION/TRAINING PROGRAM
Payer: COMMERCIAL

## 2025-02-11 DIAGNOSIS — M22.2X1 PATELLOFEMORAL PAIN SYNDROME OF RIGHT KNEE: ICD-10-CM

## 2025-02-11 DIAGNOSIS — M25.361 KNEE INSTABILITY, RIGHT: Primary | ICD-10-CM

## 2025-02-11 DIAGNOSIS — M25.561 ACUTE PAIN OF RIGHT KNEE: ICD-10-CM

## 2025-02-11 PROCEDURE — 99215 OFFICE O/P EST HI 40 MIN: CPT | Performed by: STUDENT IN AN ORGANIZED HEALTH CARE EDUCATION/TRAINING PROGRAM

## 2025-02-11 NOTE — LETTER
2/11/2025      Juancarlos Causey  [address] [place] [address]      Dear Colleague,    Thank you for referring your patient, Juancarlos Causey, to the [facility] SP[facility]TS [facility] [place]. Please see a copy of my visit note below.    SPORTS MEDICINE CLINIC FOLLOW-UP PATIENT VISIT    HISTORY OF PRESENT ILLNESS  Juancarlos Causey is a very pleasant 49 year old male [facility] /fungal  with history gastroc/soleus strain May 2024, ..who enjoys running, biking and skiing, and who presents today for follow-up of right knee pain since October 2024, that seems related to patellofemoral pain syndrome.    Previous Visit (1/16/2025)   At last visit, PT referral placed, knee MRI ordered.    Today (2/11/2025)  Since last visit MRI was completed, he presents for MRI followup    Previous Treatments:  -Medications: advil and tylenol PRN  -Rehabilitation:  Elton Cueto at [facility] PT: has done 5-6 physical therapy sessions  -Durable Medical Equipment: X  -Injections: X  -Modalities: ice  -Other Providers seen: X    Sports, Hobbies, Employment:  - enjoys biking and running.   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     Area of Problem  11/20/24 2/11/2025 Date 3 Date 4 Date 5   Function Ability in last week - % of Baseline (0 is worst & 100 is best)    50%*       Sport/Activity Ability in last week - % of Baseline (0 is worst & 100 is best)    **       Pain Level in the last week (0 is best & 10 is worst)  3/10  7/10      *standing up from seated hurts, adjusting at night time, after 20 minutes of walking to work  **does okay with biking but running is significantly painful    Additional Information of  consideration:  -Poor Balance?None  -Numbness/paresthesias in extremities?None    MEDICATIONS    Current Outpatient Medications:      propranolol (INDERAL) 20 MG tablet, Take 30 min before speaking engagement, Disp: 30 tablet, Rfl: 0     rosuvastatin (CRESTOR) 20 MG tablet, Take 1 tablet (20 mg) by mouth daily., Disp: 90 tablet, Rfl: 3    ALLERGIES  No Known Allergies    PAST MEDICAL HISTORY  Past Medical History:   Diagnosis Date     Atrial fibrillation (H)      Dyslipidemia      Hyperlipidemia      Paroxysmal atrial fibrillation (H)      Perianal abscess 05/20/2023       PAST SURGICAL HISTORY  Past Surgical History:   Procedure Laterality Date     ABCESS DRAINAGE  05/2023    Perianal     CARDIOVERSION  11/12/2019     EP ABLATION N/A 12/31/2019     EP ABLATION AFLUTTER  12/31/2019    Procedure: EP Ablation Atrial Flutter;  Surgeon: Kal Hoover MD;  Location: [facility] Cath Lab;  Service: Cardiology     EP ABLATION PVI Left 12/31/2019    Procedure: EP Ablation PVI;  Surgeon: Kal Hoover MD;  Location: [facility] Cath Lab;  Service: Cardiology     HC LAP,INGUINAL HERNIA REPR,INITIAL Bilateral     8 months     HC TOOTH EXTRACTION W/FORCEP       HERNIA REPAIR Bilateral        SOCIAL HISTORY  Social History     Tobacco Use     Smoking status: Never     Smokeless tobacco: Never   Vaping Use     Vaping status: Never Used   Substance Use Topics     Alcohol use: Yes     Alcohol/week: 21.0 standard drinks of alcohol     Types: 21 Glasses of wine per week     Comment: 3 drinks a day     Drug use: Not Currently       FAMILY HISTORY  Family History   Problem Relation Age of Onset     Colon Cancer Mother 45     Breast Cancer Mother 82     Atrial fibrillation Father      Urinary tract infection Father         recurrent     Diabetes Type 2  Father      Hyperlipidemia Sister      No Known Problems Sister      No Known Problems Brother      Hyperlipidemia Maternal Grandmother      Arthritis Maternal Grandmother       "Prostate Cancer No family hx of      Coronary Artery Disease No family hx of      Cerebrovascular Disease No family hx of        REVIEW OF SYSTEMS  Complete 12 system Review of Systems performed and was negative except for HPI.    VITALS  There were no vitals filed for this visit.    PHYSICAL EXAMINATION   General: Age appropriate appearing, no acute distress  HEENT: normocephalic, atraumatic, sclera non-icteric  Skin: No open skin lesion noted in visible areas.  Respiratory: Non labored breathing, No wheezes.  Cardiac/Vessels: No edema, cyanosis, clubbing noted in all extremities.  Lymph: No palpable lymph node swelling noted around the affected area.  Mental: There was no signs of aberrant behaviors noted. Patient was pleasant throughout the encounter.    Functional Movements: mildly antalgic gait appreciated with minimal stance phase on the right side. Able to complete double leg squat, right more than left dynamic valgus with right knee dynamic instability on single leg squat    RIGHT KNEE:   Inspection: No deformities, atrophy, effusion, warmth   Palpation: TTP right interior patella area, no TTP QT, PT, medial/lateral joint lines  Range of Motion (Estimated, Active unless otherwise noted):Flexion 140 degrees, extension to neutral.   Special Testing: Positive [facility]'s test for stiffness.  The following special tests were negative: Valgus stress test, Varus stress test, Lachman's test, Anterior drawer test, Posterior drawer test.    IMAGING STUDIES:  1/25/2025 Right knee MRI: \"1.  Low-grade or resolving subacute sprain of the PCL without tearing.  2.  Shallow radial tear of the posterior horn of the medial meniscus on series 6, image 12.  3.  Mild grade 2 cartilage thinning both sides of the medial compartment. No significant surface irregularity.  4.  Mild quadriceps tendinopathy.  5.  No evidence for fracture.  6.  No significant effusion.\"    11/20/2024: preserved joint spaces with Insall-Salvati ratio 1.3 " consistent with patella belen.  Otherwise preserved joint spaces without fracture.  I personally reviewed these images and shared the findings with the patient.    IMPRESSION  Juancarlos Causey is a very pleasant 49 year old male [facility] /fungal  who enjoys running, biking and skiing, and who presents today for follow-up of right anterior knee pain due to patellofemoral pain syndrome supported clinically, with initial improvement in physical therapy and now worsening pain/stiffness and instability with attempted running.  MRI findings reveal low grade PCL tear, shallow radial grade posterior meniscal tear, mild medial compartment cartilage thinning and mild quadriceps tendinopathy.  Given that he has done several physical therapy sessions without improvement in symptoms, he is interested in pursuing additional treatment options.    PLAN  The following was discussed with the patient:  Activity Modification: Activity as tolerated  IImaging/Tests: MRI reviewed  Rehabilitation: No changes  Orthotics/Bracing: Continue knee brace with activity  Medication: Non-prescription topical and/or oral analgesics may be used as needed  Interventions: None at this time  Referral: Referral placed to my surgical colleague Dr. Triston Merchant    Follow-up Plan: As needed  Resources Provided: Written and verbal information detailing above findings and plan provided including after visit summary.    They were encouraged to message me on [place] whenever they needed.    The patient was in agreement with this plan. All questions were answered to the best of my ability.    Total time (face-to-face and non-face-to-face) spent on today's visit was 40 minutes. This included preparation for the visit (i.e. reviewing test results), performance of a medically appropriate history and examination, placing orders for medications, tests or other procedures, and discussing the plan of care with  the patient. This time is exclusive of procedures performed and time spent teaching.     Tere Corbett MD, [facility]  Sports Medicine Attending Physician  Department of Physical Medicine & Rehabilitation         Again, thank you for allowing me to participate in the care of your patient.        Sincerely,        Tere Corbett MD    Electronically signed

## 2025-02-11 NOTE — PROGRESS NOTES
SPORTS MEDICINE CLINIC FOLLOW-UP PATIENT VISIT    HISTORY OF PRESENT ILLNESS  Juancarlos Causey is a very pleasant 49 year old male [facility] /fungal  with history gastroc/soleus strain May 2024, ..who enjoys running, biking and skiing, and who presents today for follow-up of right knee pain since October 2024, that seems related to patellofemoral pain syndrome.    Previous Visit (1/16/2025)   At last visit, PT referral placed, knee MRI ordered.    Today (2/11/2025)  Since last visit MRI was completed, he presents for MRI followup    Previous Treatments:  -Medications: advil and tylenol PRN  -Rehabilitation:  Elton Cueto at [facility] PT: has done 5-6 physical therapy sessions  -Durable Medical Equipment: X  -Injections: X  -Modalities: ice  -Other Providers seen: X    Sports, Hobbies, Employment:  - enjoys biking and running.   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     Area of Problem  11/20/24 2/11/2025 Date 3 Date 4 Date 5   Function Ability in last week - % of Baseline (0 is worst & 100 is best)    50%*       Sport/Activity Ability in last week - % of Baseline (0 is worst & 100 is best)    **       Pain Level in the last week (0 is best & 10 is worst)  3/10  7/10      *standing up from seated hurts, adjusting at night time, after 20 minutes of walking to work  **does okay with biking but running is significantly painful    Additional Information of consideration:  -Poor Balance?None  -Numbness/paresthesias in extremities?None    MEDICATIONS    Current Outpatient Medications:     propranolol (INDERAL) 20 MG tablet, Take 30 min before speaking engagement, Disp: 30 tablet, Rfl: 0    rosuvastatin (CRESTOR) 20 MG  tablet, Take 1 tablet (20 mg) by mouth daily., Disp: 90 tablet, Rfl: 3    ALLERGIES  No Known Allergies    PAST MEDICAL HISTORY  Past Medical History:   Diagnosis Date    Atrial fibrillation (H)     Dyslipidemia     Hyperlipidemia     Paroxysmal atrial fibrillation (H)     Perianal abscess 05/20/2023       PAST SURGICAL HISTORY  Past Surgical History:   Procedure Laterality Date    ABCESS DRAINAGE  05/2023    Perianal    CARDIOVERSION  11/12/2019    EP ABLATION N/A 12/31/2019    EP ABLATION AFLUTTER  12/31/2019    Procedure: EP Ablation Atrial Flutter;  Surgeon: Kal Hoover MD;  Location: [facility] Cath Lab;  Service: Cardiology    EP ABLATION PVI Left 12/31/2019    Procedure: EP Ablation PVI;  Surgeon: Kal Hoover MD;  Location: [facility] Cath Lab;  Service: Cardiology    HC LAP,INGUINAL HERNIA REPR,INITIAL Bilateral     8 months    HC TOOTH EXTRACTION W/FORCEP      HERNIA REPAIR Bilateral        SOCIAL HISTORY  Social History     Tobacco Use    Smoking status: Never    Smokeless tobacco: Never   Vaping Use    Vaping status: Never Used   Substance Use Topics    Alcohol use: Yes     Alcohol/week: 21.0 standard drinks of alcohol     Types: 21 Glasses of wine per week     Comment: 3 drinks a day    Drug use: Not Currently       FAMILY HISTORY  Family History   Problem Relation Age of Onset    Colon Cancer Mother 45    Breast Cancer Mother 82    Atrial fibrillation Father     Urinary tract infection Father         recurrent    Diabetes Type 2  Father     Hyperlipidemia Sister     No Known Problems Sister     No Known Problems Brother     Hyperlipidemia Maternal Grandmother     Arthritis Maternal Grandmother     Prostate Cancer No family hx of     Coronary Artery Disease No family hx of     Cerebrovascular Disease No family hx of        REVIEW OF SYSTEMS  Complete 12 system Review of Systems performed and was negative except for HPI.    VITALS  There were no vitals filed for this visit.    PHYSICAL  "EXAMINATION   General: Age appropriate appearing, no acute distress  HEENT: normocephalic, atraumatic, sclera non-icteric  Skin: No open skin lesion noted in visible areas.  Respiratory: Non labored breathing, No wheezes.  Cardiac/Vessels: No edema, cyanosis, clubbing noted in all extremities.  Lymph: No palpable lymph node swelling noted around the affected area.  Mental: There was no signs of aberrant behaviors noted. Patient was pleasant throughout the encounter.    Functional Movements: mildly antalgic gait appreciated with minimal stance phase on the right side. Able to complete double leg squat, right more than left dynamic valgus with right knee dynamic instability on single leg squat    RIGHT KNEE:   Inspection: No deformities, atrophy, effusion, warmth   Palpation: TTP right interior patella area, no TTP QT, PT, medial/lateral joint lines  Range of Motion (Estimated, Active unless otherwise noted):Flexion 140 degrees, extension to neutral.   Special Testing: Positive Martha's test for stiffness.  The following special tests were negative: Valgus stress test, Varus stress test, Lachman's test, Anterior drawer test, Posterior drawer test.    IMAGING STUDIES:  1/25/2025 Right knee MRI: \"1.  Low-grade or resolving subacute sprain of the PCL without tearing.  2.  Shallow radial tear of the posterior horn of the medial meniscus on series 6, image 12.  3.  Mild grade 2 cartilage thinning both sides of the medial compartment. No significant surface irregularity.  4.  Mild quadriceps tendinopathy.  5.  No evidence for fracture.  6.  No significant effusion.\"    11/20/2024: preserved joint spaces with Insall-Salvati ratio 1.3 consistent with patella belen.  Otherwise preserved joint spaces without fracture.  I personally reviewed these images and shared the findings with the patient.    IMPRESSION  Juancarlos Causey is a very pleasant 49 year old male [facility] /fungal  who " enjoys running, biking and skiing, and who presents today for follow-up of right anterior knee pain due to patellofemoral pain syndrome supported clinically, with initial improvement in physical therapy and now worsening pain/stiffness and instability with attempted running.  MRI findings reveal low grade PCL tear, shallow radial grade posterior meniscal tear, mild medial compartment cartilage thinning and mild quadriceps tendinopathy.  Given that he has done several physical therapy sessions without improvement in symptoms, he is interested in pursuing additional treatment options.    PLAN  The following was discussed with the patient:  Activity Modification: Activity as tolerated  IImaging/Tests: MRI reviewed  Rehabilitation: No changes  Orthotics/Bracing: Continue knee brace with activity  Medication: Non-prescription topical and/or oral analgesics may be used as needed  Interventions: None at this time  Referral: Referral placed to my surgical colleague Dr. Triston Merchant    Follow-up Plan: As needed  Resources Provided: Written and verbal information detailing above findings and plan provided including after visit summary.    They were encouraged to message me on [place] whenever they needed.    The patient was in agreement with this plan. All questions were answered to the best of my ability.    Total time (face-to-face and non-face-to-face) spent on today's visit was 40 minutes. This included preparation for the visit (i.e. reviewing test results), performance of a medically appropriate history and examination, placing orders for medications, tests or other procedures, and discussing the plan of care with the patient. This time is exclusive of procedures performed and time spent teaching.     Tere Corbett MD, [facility]  Sports Medicine Attending Physician  Department of Physical Medicine & Rehabilitation

## 2025-02-11 NOTE — PATIENT INSTRUCTIONS
Juancarlos Causey, It was nice to see you in our office today.      DIAGNOSIS:   1. Knee instability, right    2. Acute pain of right knee    3. Patellofemoral pain syndrome of right knee      INSTRUCTIONS FOR FOLLOW-UP CARE:  Activity Modification: As tolerated, being guided by pain using the following simple  Traffic Light System  as it relates to tendon pain:  Green Light (0-3/10 pain): No increases in symptoms, you are OK to continue the activity, or perhaps increase load slightly.  Yellow light (4-6/10 pain): Minor increase in symptoms, but you can move normally within an hour of exercise, and pain reduces to normal within the next 24 hours. Proceed with caution, you can do minor bouts of loading, but too much will aggravate your symptoms and increase pain.  Red light (7-10/10 pain): Cease activity, as you have exceeded your tolerance. Generally, involves a significant increase in pain, which may not settle in the following 24 hours. Rest for 24-48 hours, allow symptoms to settle down, then begin gentle movement, and monitor your progression.  Imaging/Tests: MRI reviewed  Rehabilitation: No changes  Orthotics/Bracing: Continue knee brace with activity  Medication: Tylenol 500-1000mg (up to three times per day - Max dosing per day = 3g) or ibuprofen 600mg (up to three times per day) as needed for pain and swelling. Always take ibuprofen with food.    Interventions: None at this time  Referral: Referral placed to Dr. Triston Merchant - [phone]   Follow-up Plan: As needed        CLINIC LOCATIONS:     [place] [facility]:  [phone] [address], [address] MYCHART HELP LINE: [phone]   [place] [address] TRIAGE LINE: [phone]   (Monday & Friday) APPOINTMENTS: [phone]    RADIOLOGY: [phone]   [place] [facility] [phone] [address] PHYSICAL & OCCUPATIONAL THERAPY: [phone]*   [place] [address] BILLING QUESTIONS: [phone]   (Tuesday &  Thursday) FAX: [phone]       *Therapy locations that offer Saturday options: [place], [place], [place]    Thank you for choosing [facility]!    If you have any questions, please do not hesitate to reach out on [employer] [place] or Call [phone] and ask for my team.    Tere Corbett MD, [facility]

## 2025-02-12 ENCOUNTER — PATIENT OUTREACH (OUTPATIENT)
Dept: CARE COORDINATION | Facility: CLINIC | Age: 50
End: 2025-02-12
Payer: COMMERCIAL

## 2025-02-20 PROBLEM — M25.561 ACUTE PAIN OF RIGHT KNEE: Status: RESOLVED | Noted: 2024-12-11 | Resolved: 2025-02-20

## 2025-06-25 ENCOUNTER — HOSPITAL ENCOUNTER (OUTPATIENT)
Dept: CT IMAGING | Facility: CLINIC | Age: 50
Discharge: HOME | End: 2025-06-25
Attending: MASSAGE THERAPIST
Payer: COMMERCIAL

## 2025-06-25 ENCOUNTER — RESULTS FOLLOW-UP (OUTPATIENT)
Dept: UROLOGY | Facility: CLINIC | Age: 50
End: 2025-06-25

## 2025-06-25 ENCOUNTER — OFFICE VISIT (OUTPATIENT)
Dept: UROLOGY | Facility: CLINIC | Age: 50
End: 2025-06-25
Payer: COMMERCIAL

## 2025-06-25 VITALS
WEIGHT: 233 LBS | OXYGEN SATURATION: 98 % | TEMPERATURE: 97.8 F | RESPIRATION RATE: 18 BRPM | HEART RATE: 61 BPM | HEIGHT: 71 IN | SYSTOLIC BLOOD PRESSURE: 139 MMHG | DIASTOLIC BLOOD PRESSURE: 89 MMHG | BODY MASS INDEX: 32.62 KG/M2

## 2025-06-25 DIAGNOSIS — F41.9 ANXIETY: Primary | ICD-10-CM

## 2025-06-25 DIAGNOSIS — R31.0 GROSS HEMATURIA: ICD-10-CM

## 2025-06-25 DIAGNOSIS — F41.9 ANXIETY: ICD-10-CM

## 2025-06-25 LAB
ALBUMIN UR-MCNC: NEGATIVE MG/DL
AMORPH CRY #/AREA URNS HPF: ABNORMAL /HPF
APPEARANCE UR: ABNORMAL
BILIRUB UR QL STRIP: NEGATIVE
COLOR UR AUTO: ABNORMAL
GLUCOSE UR STRIP-MCNC: NEGATIVE MG/DL
HGB UR QL STRIP: ABNORMAL
KETONES UR STRIP-MCNC: NEGATIVE MG/DL
LEUKOCYTE ESTERASE UR QL STRIP: NEGATIVE
NITRATE UR QL: NEGATIVE
PH UR STRIP: 6.5 [PH] (ref 5–7)
RBC URINE: >182 /HPF
SP GR UR STRIP: 1.02 (ref 1–1.03)
UROBILINOGEN UR STRIP-MCNC: NORMAL MG/DL
WBC URINE: 2 /HPF

## 2025-06-25 PROCEDURE — 74178 CT ABD&PLV WO CNTR FLWD CNTR: CPT

## 2025-06-25 PROCEDURE — 250N000009 HC RX 250: Performed by: MASSAGE THERAPIST

## 2025-06-25 PROCEDURE — 250N000011 HC RX IP 250 OP 636: Performed by: MASSAGE THERAPIST

## 2025-06-25 RX ORDER — IOPAMIDOL 755 MG/ML
500 INJECTION, SOLUTION INTRAVASCULAR ONCE
Status: COMPLETED | OUTPATIENT
Start: 2025-06-25 | End: 2025-06-25

## 2025-06-25 RX ADMIN — IOPAMIDOL 100 ML: 755 INJECTION, SOLUTION INTRAVENOUS at 15:53

## 2025-06-25 RX ADMIN — SODIUM CHLORIDE 100 ML: 9 INJECTION, SOLUTION INTRAVENOUS at 15:53

## 2025-06-25 ASSESSMENT — PAIN SCALES - GENERAL: PAINLEVEL_OUTOF10: NO PAIN (0)

## 2025-06-25 NOTE — PROGRESS NOTES
CC: Hematuria.    HPI: Anna Causey, is a pleasant 49 year old male, requested to be seen by Татьяна Crews for evaluation of gross hematuria.         History of Present Illness-  Gross hematuria  Mr. Causey reports experiencing blood in his urine for about a month, with occurrences intermittently. He notices a pattern related to hydration; when fully hydrated, blood appears near the end of urination. He also mentions the presence of blood clots during urination, which can sometimes be large enough to cause temporary blockage. The hematuria is more pronounced after activities, such as canoeing or biking. There is no associated pain or lightheadedness. The patient reports gross hematuria. Voids without difficulty, and reports nocturia x 2.  Frequency, urgency. He currently denies any dysuria, pyuria, hesitancy, intermittency, feelings of incomplete emptying, or any recent history of urinary tract infections or stones.    Lifestyle Factors  Mr. Causey lives seasonally at [facility] from May to September and resides in [place] otherwise. He maintains a stable alcohol consumption pattern, typically having a beer or wine daily. Despite being active, he acknowledges dietary changes since his A1c concerns last year.        PSA up to date:   Prostate Specific Antigen Screen   Date Value Ref Range Status   10/07/2024 0.84 0.00 - 3.50 ng/mL Final       Hematuria Risk Factors:  Age >40: Yes   Smoking history: Non-smoker  Occupational exposure to chemicals or dyes (ie, benzenes, aromatic amines): no  History of urologic disorder or disease: no  History of irritative voiding symptoms: no  History of urinary tract infection: no  Analgesic abuse: no  History of pelvic irradiation: no    Past Medical History:   Diagnosis Date    Atrial fibrillation (H)     Dyslipidemia     Hyperlipidemia     Paroxysmal atrial fibrillation (H)     Perianal abscess 05/20/2023     Past Surgical History:   Procedure  Laterality Date    ABCESS DRAINAGE  05/2023    Perianal    CARDIOVERSION  11/12/2019    EP ABLATION N/A 12/31/2019    EP ABLATION AFLUTTER  12/31/2019    Procedure: EP Ablation Atrial Flutter;  Surgeon: Kal Hoover MD;  Location: [facility];  Service: Cardiology    EP ABLATION PVI Left 12/31/2019    Procedure: EP Ablation PVI;  Surgeon: Kal Hoover MD;  Location: [facility];  Service: Cardiology    HC LAP,INGUINAL HERNIA REPR,INITIAL Bilateral     8 months    HC TOOTH EXTRACTION W/FORCEP      HERNIA REPAIR Bilateral      Current Outpatient Medications   Medication Sig Dispense Refill    rosuvastatin (CRESTOR) 20 MG tablet Take 1 tablet (20 mg) by mouth daily. 90 tablet 3     No Known Allergies  FAMILY HISTORY: There is no reported history of genitourinary carcinoma.  There is no history of urolithiasis.    Social History     Socioeconomic History    Marital status:      Spouse name: Not on file    Number of children: Not on file    Years of education: Not on file    Highest education level: Not on file   Occupational History    Not on file   Tobacco Use    Smoking status: Never    Smokeless tobacco: Never   Vaping Use    Vaping status: Never Used   Substance and Sexual Activity    Alcohol use: Yes     Alcohol/week: 21.0 standard drinks of alcohol     Types: 21 Glasses of wine per week     Comment: 3 drinks a day    Drug use: Not Currently    Sexual activity: Yes     Partners: Female   Other Topics Concern    Not on file   Social History Narrative    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 [employer]    Lives with wife (professor at [employer]) and two kids (17 and 13 as of 07/2023)     Social Drivers of Health     Financial Resource Strain: Low Risk  (10/7/2024)    Financial Resource Strain     Within the past 12 months, have you or your family members you live with been unable to get utilities (heat, electricity)  when it was really needed?: No   Food Insecurity: Low Risk  (10/7/2024)    Food Insecurity     Within the past 12 months, did you worry that your food would run out before you got money to buy more?: No     Within the past 12 months, did the food you bought just not last and you didn t have money to get more?: No   Transportation Needs: Low Risk  (10/7/2024)    Transportation Needs     Within the past 12 months, has lack of transportation kept you from medical appointments, getting your medicines, non-medical meetings or appointments, work, or from getting things that you need?: No   Physical Activity: Unknown (10/7/2024)    Exercise Vital Sign     Days of Exercise per Week: 5 days     Minutes of Exercise per Session: Not on file   Stress: No Stress Concern Present (10/7/2024)    [employer] - Occupational Stress Questionnaire     Feeling of Stress : Only a little   Social Connections: Unknown (10/7/2024)    Social Connection and Isolation Panel [NHANES]     Frequency of Communication with Friends and Family: Not on file     Frequency of Social Gatherings with Friends and Family: More than three times a week     Attends Faith Services: Not on file     Active Member of Clubs or Organizations: Not on file     Attends Club or Organization Meetings: Not on file     Marital Status: Not on file   Interpersonal Safety: Low Risk  (10/7/2024)    Interpersonal Safety     Do you feel physically and emotionally safe where you currently live?: Yes     Within the past 12 months, have you been hit, slapped, kicked or otherwise physically hurt by someone?: No     Within the past 12 months, have you been humiliated or emotionally abused in other ways by your partner or ex-partner?: No   Housing Stability: Low Risk  (10/7/2024)    Housing Stability     Do you have housing? : Yes     Are you worried about losing your housing?: No       ROS: A 5 point ROS was obtained and  otherwise negative except for  "that outlined above in the HPI.    PHYSICAL EXAM:   Vitals:    06/25/25 1357   BP: 139/89   BP Location: Right arm   Patient Position: Sitting   Cuff Size: Adult Large   Pulse: 61   Resp: 18   Temp: 97.8  F (36.6  C)   TempSrc: Temporal   SpO2: 98%   Weight: 105.7 kg (233 lb)   Height: 1.803 m (5' 11\")     PSYCH: NAD  EYES: EOMI  NEURO: AAO x3      IMAGING: None    ASSESSMENT and PLAN:    Mr. Juancarlos Causey is a pleasant 49 year old male with gross hematuria.  The differential diagnosis at this point includes stone disease, infection, BPH, prostatitis, urothelial malignancy, renal disorder versus another yet unknown diagnosis.    At this time, recommend proceeding with comprehensive hematuria evaluation to include:  - Urine cytology to look for cells concerning for malignancy.  - CT urogram for upper tract imaging.  - Cystoscopy with the first available urologist to evaluate the interior of the bladder. Follow up for hematuria as recommended by urologist performing cystoscopic evaluation.  -PSA up to date  We discussed the etiologies of gross hematuria to include but not limited to infection, nephrolithiasis, urologic malignancy, renal disease and idiopathic.  We further discussed that the evaluation includes cytology,  imaging with a CT urogram and office cystoscopy.  Patient is agreeable and the will return to cystoscopy after imaging complete.    LINDSEY Floyd CNP    Consent was obtained from the patient to use an AI documentation tool in the creation of this note.  Voice recognition software was also used.  There may be associated transcribing errors.    "

## 2025-06-25 NOTE — PATIENT INSTRUCTIONS
"Hi Juancarlos Causey, it was nice to meet you!    Thank you for allowing us the privilege of caring for you. We hope we provided you with the excellent service you deserve.   Please let us know if there is anything else we can do for you.  We want you to be completely satisfied with your care experience.    To ensure the quality of our services, you may be receiving a patient satisfaction survey from an independent patient satisfaction monitoring company.    The greatest compliment you can give is a \"Likely to Recommend.\"    Your visit was with LINDSEY Floyd CNP today.    Instructions per today's visit:     Complete your CT scan   I'll let you know results of the urine when available  Return to clinic for cystoscopy     ___________________________________________________________________________  Important contact and scheduling information:  Please call our contact center at [phone] to schedule your next appointments or to reach our nurse triage line.  Please call during clinic hours Monday through Friday 8:00a - 4:30p if you have questions.  You can contact us anytime via [employer] and we will reply during clinic hours.    Lab results will be communicated through My Chart or letter (if My Chart not used). Please call the clinic if you have not received communication after 1 week or if you have any questions.?  __________________________________________________________________________    If labs were ordered today:    Please make an appointment to have them drawn at your convenience.     To schedule the Lab Appointment using [employer]:  Select \"Schedule an Appointment\"  Select \"Lab Only\"  Answer simple questions about where you would like to be seen and your type of insurance  For \"Which locations work for you?, select the location and set up the appointment.        "

## 2025-07-02 ENCOUNTER — TELEPHONE (OUTPATIENT)
Dept: UROLOGY | Facility: CLINIC | Age: 50
End: 2025-07-02

## 2025-07-02 ENCOUNTER — PREP FOR PROCEDURE (OUTPATIENT)
Dept: SURGERY | Facility: CLINIC | Age: 50
End: 2025-07-02

## 2025-07-02 ENCOUNTER — OFFICE VISIT (OUTPATIENT)
Dept: UROLOGY | Facility: CLINIC | Age: 50
End: 2025-07-02
Payer: COMMERCIAL

## 2025-07-02 DIAGNOSIS — R31.0 GROSS HEMATURIA: Primary | ICD-10-CM

## 2025-07-02 DIAGNOSIS — D49.4 BLADDER TUMOR: Primary | ICD-10-CM

## 2025-07-02 DIAGNOSIS — D49.4 BLADDER TUMOR: ICD-10-CM

## 2025-07-02 PROCEDURE — 52000 CYSTOURETHROSCOPY: CPT | Performed by: UROLOGY

## 2025-07-02 PROCEDURE — 99213 OFFICE O/P EST LOW 20 MIN: CPT | Mod: 25 | Performed by: UROLOGY

## 2025-07-02 NOTE — TELEPHONE ENCOUNTER
[facility] Call Center    Phone Message    May a detailed message be left on voicemail: yes     Reason for Call: Lizzette is calling back. Lizzette is requesting we call pt first, otherwise call her if pt does not answer. Writer tried calling the surgery scheduling line, no answer. Please call pt.    Action Taken: Other: [facility] - Urology    Travel Screening: Not Applicable     Date of Service:

## 2025-07-02 NOTE — PATIENT INSTRUCTIONS
Please call [phone] with any questions regarding today's visit.    A surgery scheduler will reach out to schedule your procedure and answer questions regarding preparing for your surgery.

## 2025-07-02 NOTE — PROGRESS NOTES
Urology Note            S:  Juancarlos Causey is a 49 year old male who was seen in a consultation  for hematuria.   Patient has gross hematuria.  He has no other voiding complaints in addition to hematuria.  He has no flank pain.  He has no history of kidney stone.  He denies any trauma.  Recent UA showed many rbc/hpf.  Urine culture was neg.  Recent CT urogram showed bladder mass.  Past Medical History:   Diagnosis Date    Atrial fibrillation (H)     Dyslipidemia     Hyperlipidemia     Paroxysmal atrial fibrillation (H)     Perianal abscess 05/20/2023     Current Outpatient Medications   Medication Sig Dispense Refill    rosuvastatin (CRESTOR) 20 MG tablet Take 1 tablet (20 mg) by mouth daily. 90 tablet 3     Past Surgical History:   Procedure Laterality Date    ABCESS DRAINAGE  05/2023    Perianal    CARDIOVERSION  11/12/2019    EP ABLATION N/A 12/31/2019    EP ABLATION AFLUTTER  12/31/2019    Procedure: EP Ablation Atrial Flutter;  Surgeon: Kal Hoover MD;  Location: [facility];  Service: Cardiology    EP ABLATION PVI Left 12/31/2019    Procedure: EP Ablation PVI;  Surgeon: Kal Hoover MD;  Location: [facility];  Service: Cardiology    HC LAP,INGUINAL HERNIA REPR,INITIAL Bilateral     8 months    HC TOOTH EXTRACTION W/FORCEP      HERNIA REPAIR Bilateral       Social History     Socioeconomic History    Marital status:      Spouse name: Not on file    Number of children: Not on file    Years of education: Not on file    Highest education level: Not on file   Occupational History    Not on file   Tobacco Use    Smoking status: Never    Smokeless tobacco: Never   Vaping Use    Vaping status: Never Used   Substance and Sexual Activity    Alcohol use: Yes     Alcohol/week: 21.0 standard drinks of alcohol     Types: 21 Glasses of wine per week     Comment: 3 drinks a day    Drug use: Not Currently    Sexual activity: Yes     Partners: Female   Other Topics Concern    Not on file    Social History Narrative    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 [employer]    Lives with wife (professor at [employer]) and two kids (17 and 13 as of 07/2023)     Social Drivers of Health     Financial Resource Strain: Low Risk  (10/7/2024)    Financial Resource Strain     Within the past 12 months, have you or your family members you live with been unable to get utilities (heat, electricity) when it was really needed?: No   Food Insecurity: Low Risk  (10/7/2024)    Food Insecurity     Within the past 12 months, did you worry that your food would run out before you got money to buy more?: No     Within the past 12 months, did the food you bought just not last and you didn t have money to get more?: No   Transportation Needs: Low Risk  (10/7/2024)    Transportation Needs     Within the past 12 months, has lack of transportation kept you from medical appointments, getting your medicines, non-medical meetings or appointments, work, or from getting things that you need?: No   Physical Activity: Unknown (10/7/2024)    Exercise Vital Sign     Days of Exercise per Week: 5 days     Minutes of Exercise per Session: Not on file   Stress: No Stress Concern Present (10/7/2024)    [employer] - Occupational Stress Questionnaire     Feeling of Stress : Only a little   Social Connections: Unknown (10/7/2024)    Social Connection and Isolation Panel [NHANES]     Frequency of Communication with Friends and Family: Not on file     Frequency of Social Gatherings with Friends and Family: More than three times a week     Attends Anabaptist Services: Not on file     Active Member of Clubs or Organizations: Not on file     Attends Club or Organization Meetings: Not on file     Marital Status: Not on file   Interpersonal Safety: Low Risk  (10/7/2024)    Interpersonal Safety     Do you feel physically and emotionally safe where you currently  live?: Yes     Within the past 12 months, have you been hit, slapped, kicked or otherwise physically hurt by someone?: No     Within the past 12 months, have you been humiliated or emotionally abused in other ways by your partner or ex-partner?: No   Housing Stability: Low Risk  (10/7/2024)    Housing Stability     Do you have housing? : Yes     Are you worried about losing your housing?: No     Family History   Problem Relation Age of Onset    Colon Cancer Mother 45    Breast Cancer Mother 82    Atrial fibrillation Father     Urinary tract infection Father         recurrent    Diabetes Type 2  Father     Hyperlipidemia Sister     No Known Problems Sister     No Known Problems Brother     Hyperlipidemia Maternal Grandmother     Arthritis Maternal Grandmother     Prostate Cancer No family hx of     Coronary Artery Disease No family hx of     Cerebrovascular Disease No family hx of           REVIEW OF SYSTEMS  =================  C: NEGATIVE for fever, chills, change in weight  I: NEGATIVE for worrisome rashes, moles or lesions  E/M: NEGATIVE for ear, mouth and throat problems  R: NEGATIVE for significant cough or SHORTNESS OF BREATH  CV:  NEGATIVE for chest pain, palpitations or peripheral edema  GI: NEGATIVE for nausea, abdominal pain, heartburn, or change in bowel habits  NEURO: NEGATIVE numbness/weakness  : see HPI  PSYCH: NEGATIVE depression/anxiety  LYmph: no new enlarged lymph nodes  Ortho: no new trauma/movements              Constitutional: healthy, alert and no distress  Cardiovascular: negative, PMI normal.   Respiratory: negative, no evidence of respiratory distress  Gastrointestinal: Abdomen soft, non-tender. BS normal. No masses, organomegaly  : penis no discharge.  Testis no masses.  No scrotal skin lesion.  Musculoskeletal: extremities normal- no gross deformities noted, gait normal and normal muscle tone  Skin: no suspicious lesions or rashes  Neurologic: Alert and oriented  Musculaskeletal: moving  all extremities  Psychiatric: mentation appears normal. and affect normal/bright  Hematologic/Lymphatic/Immunologic: normal ant/post cervical, axillary, supraclavicular and inguinal nodes    Study Result    Narrative & Impression   EXAM: CT UROGRAM WO and W CONTRAST  LOCATION: [facility]  DATE: 6/25/2025     INDICATION:  Gross hematuria, Anxiety  COMPARISON: None.  TECHNIQUE: CT scan of the abdomen and pelvis using urogram technique with pre contrast, post contrast, and delayed images. Multiplanar reformats were obtained. Dose reduction techniques were used.   CONTRAST: 100 mL of Isovue-370     FINDINGS:   LOWER CHEST: Unremarkable.     HEPATOBILIARY: 9 mm hypoattenuating lesion is seen within the right hepatic lobe and measures above fluid attenuation in internal density (series 6, image 23). Gallbladder appears unremarkable.     PANCREAS: No significant mass, duct dilatation, or inflammatory change.     SPLEEN: Normal size.     ADRENAL GLANDS: No significant nodules.     RIGHT KIDNEY/URETER: No hydronephrosis or nephrolithiasis is seen.     LEFT KIDNEY/URETER: No hydronephrosis or nephrolithiasis is seen.     BLADDER: 1.4 x 0.9 x 1.6 cm enhancing mass is noted along the left posterior wall of urinary bladder. Diffuse wall thickening is also noted within the urinary bladder.     BOWEL: Diverticulosis of the colon. No acute inflammatory change. No obstruction. Mild wall thickening is noted along the lower rectum. No surrounding inflammatory changes are seen.     LYMPH NODES: No lymphadenopathy.     VASCULATURE: Scattered vascular calcifications are seen in the abdominal aorta.     PELVIC ORGANS: Prostate gland is mildly enlarged measuring 4.9 cm in transverse diameter.     MUSCULOSKELETAL: Multilevel degenerative changes are seen in the spine.                                                                      IMPRESSION:  1.  1.6 cm enhancing mass is seen along the left posterior  wall of urinary bladder. Findings are highly concerning for bladder malignancy. Recommend urological consultation and correlation with cystoscopy.  2.  9 mm hypoattenuating lesion in the right hepatic lobe is indeterminant. Metastatic disease cannot be excluded. Suggest attention on follow-up exam. Alternatively, consider MRI liver protocol study.  3.  Mild, diffuse wall thickening of the urinary bladder could be due to outlet obstruction given prostatomegaly. Superimposed cystitis cannot be excluded. Suggest correlation with urinalysis.  4.  Mild wall thickening along the lower rectum, favored to be due to underdistention. Mild proctitis is in the differential diagnosis but considered to be less likely given lack of surrounding inflammatory changes. Underlying rectal mass cannot be   excluded but also considered to be less likely given lack of associated nodularity. Suggest clinical correlation.     Cysto done today    Patient is draped and prepped.  Flexible cystoscopy placed under direct vision.      The anterior urethra is normal   The prostatic urethra is normal.     The length is 1cm,  the coaptation is 1 cm.     In the bladder there is a tumor 2 cm on left lateral/posterior wall    Assessment/Plan:  (R31.0) Gross hematuria  (primary encounter diagnosis)  Comment:    Plan: CYSTOURETHROSCOPY (44417)          Bladder tumor found    (D49.4) Bladder tumor  Comment:    Plan: schedule for TURBT/mitomycin instillation.

## 2025-07-02 NOTE — TELEPHONE ENCOUNTER
[facility] Call Center    Phone Message    May a detailed message be left on voicemail: yes     Reason for Call: Other: Patient's wife called in requesting to speak with provider's nurse in regards to questions that they had in regards to post op and the catheter. Please review and call her back to discuss.      Action Taken: Message routed to:  Other: [facility]    Travel Screening: Not Applicable     Date of Service:

## 2025-07-02 NOTE — TELEPHONE ENCOUNTER
Surgery Scheduling left message for patient to call back to schedule surgery [phone]. Unable to reach by phone sent a [employer] message

## 2025-07-02 NOTE — TELEPHONE ENCOUNTER
Wife and pt returned call and we discussed that the catheter is usually taken out the following day at home, if they are super uncomfortable a nurse visit is ok. Post op on the 23rd is for path findings. This may be done sooner but we cannot guarantee this. Keep post op as is and if sone sooner we usually call.    NANCY Robert RN 7/2/2025 2:39 PM

## 2025-07-02 NOTE — TELEPHONE ENCOUNTER
Type of surgery: CYSTOSCOPY, WITH TRANSURETHRAL RESECTION OF BLADDER TUMOR AND INSTILLATION OF CHEMOTHERAPEUTIC AGENT INTO BLADDER   Location of surgery: [facility]  Date and time of surgery: 07/14/2025  Surgeon: EVE  Pre-Op Appt Date: Patient to schedule   Post-Op Appt Date: 07/23/2025   Packet sent out: Yes  Pre-cert/Authorization completed:  No  Date:

## 2025-07-07 ENCOUNTER — OFFICE VISIT (OUTPATIENT)
Dept: FAMILY MEDICINE | Facility: CLINIC | Age: 50
End: 2025-07-07
Payer: COMMERCIAL

## 2025-07-07 ENCOUNTER — TRANSCRIBE ORDERS (OUTPATIENT)
Dept: OTHER | Age: 50
End: 2025-07-07

## 2025-07-07 VITALS
DIASTOLIC BLOOD PRESSURE: 87 MMHG | SYSTOLIC BLOOD PRESSURE: 133 MMHG | BODY MASS INDEX: 32.18 KG/M2 | TEMPERATURE: 98.1 F | WEIGHT: 230.75 LBS | RESPIRATION RATE: 15 BRPM | OXYGEN SATURATION: 98 % | HEART RATE: 65 BPM

## 2025-07-07 DIAGNOSIS — C67.9 BLADDER CANCER (H): Primary | ICD-10-CM

## 2025-07-07 DIAGNOSIS — R31.0 GROSS HEMATURIA: ICD-10-CM

## 2025-07-07 DIAGNOSIS — Z01.818 PREOP GENERAL PHYSICAL EXAM: Primary | ICD-10-CM

## 2025-07-07 NOTE — NURSING NOTE
"49 year old  Chief Complaint   Patient presents with    Pre-Op Exam       Blood pressure 133/87, pulse 65, temperature 98.1  F (36.7  C), temperature source Temporal, resp. rate 15, weight 104.7 kg (230 lb 12 oz), SpO2 98%. Body mass index is 32.18 kg/m .  Patient Active Problem List   Diagnosis    PAF (paroxysmal atrial fibrillation) (H)    Family history of colon cancer in mother    Herniated vertebral disc    Dyslipidemia    Lower urinary tract symptoms    Alcohol dependence (H)    Patellofemoral pain syndrome of right knee       Wt Readings from Last 2 Encounters:   07/07/25 104.7 kg (230 lb 12 oz)   06/25/25 105.7 kg (233 lb)     BP Readings from Last 3 Encounters:   07/07/25 133/87   06/25/25 139/89   10/07/24 128/82         Current Outpatient Medications   Medication Sig Dispense Refill    rosuvastatin (CRESTOR) 20 MG tablet Take 1 tablet (20 mg) by mouth daily. 90 tablet 3     No current facility-administered medications for this visit.       Social History     Tobacco Use    Smoking status: Never    Smokeless tobacco: Never   Vaping Use    Vaping status: Never Used   Substance Use Topics    Alcohol use: Yes     Alcohol/week: 21.0 standard drinks of alcohol     Types: 21 Glasses of wine per week     Comment: 3 drinks a day    Drug use: Not Currently       Health Maintenance Due   Topic Date Due    PNEUMOCOCCAL VACCINE 50+ YEARS (1 of 2 - PCV) Never done    ZOSTER VACCINE (1 of 2) Never done    HEPATITIS B VACCINE (2 of 3 - 19+ 3-dose series) 07/31/2023    ADVANCE CARE PLANNING  04/23/2024    COLORECTAL CANCER SCREENING  09/04/2024    COVID-19 VACCINE (7 - Moderna risk 2024-25 season) 05/04/2025       No results found for: \"PAP\"      July 7, 2025 4:04 PM    "

## 2025-07-07 NOTE — PROGRESS NOTES
Preoperative Evaluation  [facility] [place] [id number] [facility], [address]  [facility] [address]  Phone: [phone]  Fax: [phone]  Primary Provider: Татьяна Crews MD  Pre-op Performing Provider: Mohsen Escalante MD  Jul 7, 2025 7/6/2025   Surgical Information   What procedure is being done? Bladder cyst removal   Facility or Hospital where procedure/surgery will be performed: [facility]   Who is doing the procedure / surgery? Camilo   Date of surgery / procedure: July 14   Time of surgery / procedure: 3:30   Where do you plan to recover after surgery? at home with family     Fax number for surgical facility: Note does not need to be faxed, will be available electronically in Epic.    Assessment & Plan   Appears stable for Bladder Cyst removal.   Has a history of sensitivity to dosing of anesthesia    Only medication is Rosuvastatin    Recommendation  Approval given to proceed with proposed procedure, without further diagnostic evaluation.  Patient has spoken with Urology and will alert Anesthesiology of past sensitivity to anesthetic medications    --Mohsen Escalante MD    Cheri Bauer is a 49 year old, presenting for the following:  Pre-Op Exam    He noticed hematuria and was found to have a 1.6 cm bladder cyst.     Had Paroxysmal A. Fib around the year 2018. Then had an ablation in 2019 and has done well since. Still has an occasional premature beat.   He followed with Cardiology for a few years afterwards and he's been in NSR.         7/6/2025   Pre-Op Questionnaire   Have you ever had a heart attack or stroke? No   Have you ever had surgery on your heart or blood vessels, such as a stent placement, a coronary artery bypass, or surgery on an artery in your head, neck, heart, or legs? (!) YES. He had an ablation for PAF in 2019.    Do you have chest pain with activity? No   Do you have a history of heart failure? No   Do  you currently have a cold, bronchitis or symptoms of other infection? No   Do you have a cough, shortness of breath, or wheezing? No   Do you or anyone in your family have previous history of blood clots? No   Do you or does anyone in your family have a serious bleeding problem such as prolonged bleeding following surgeries or cuts? No   Have you ever had problems with anemia or been told to take iron pills? No   Have you had any abnormal blood loss such as black, tarry or bloody stools? No   Have you ever had a blood transfusion? No   Are you willing to have a blood transfusion if it is medically needed before, during, or after your surgery? Yes   Have you or any of your relatives ever had problems with anesthesia? (!) YES. He was disoriented after the ablation.    Do you have sleep apnea, excessive snoring or daytime drowsiness? No   Do you have any artifical heart valves or other implanted medical devices like a pacemaker, defibrillator, or continuous glucose monitor? No   Do you have artificial joints? No   Are you allergic to latex? No     Advance Care Planning    Discussed advance care planning with patient; informed AVS has link to [employer].    Preoperative Review of    reviewed - no record of controlled substances prescribed.      Status of Chronic Conditions:  See problem list for active medical problems.  Problems all longstanding and stable, except as noted/documented.  See ROS for pertinent symptoms related to these conditions.    Patient Active Problem List    Diagnosis Date Noted    Patellofemoral pain syndrome of right knee 12/11/2024     Priority: Medium    Alcohol dependence (H) 11/20/2024     Priority: Medium    Lower urinary tract symptoms 03/24/2024     Priority: Medium    Dyslipidemia 01/28/2020     Priority: Medium     - 1/20/17 Chol 272, TGY 74, HDL 53,   - 4/23/19 Chol 251, TGY 81, HDL 57,    - was on Rosuvastatin 20mg then 10mg for about 3 weeks starting in  November 2019  - tolerated well during this time  - 11/25/19 Chol 143, TGY 62, HDL 43, LDL 88     - stopped Rosuvastatin in mid-December 2019  - 2/3/20 Fasting lipids: Chol 201, TGY 97, HDL 53,   - decided to stay off of rosuvastatin at that time     - 11/25/19 Lipo(a) 17mg/dl (ref range <=29mg/dl)  - 1/20/17 hsCRP 0.4  - 11/25/19 hsCRP 0.7   - 4/23/19 A1c 5.4%      PAF (paroxysmal atrial fibrillation) (H) 12/02/2019     Priority: Medium     - s/p Ablation 12/31/19        Family history of colon cancer in mother 01/20/2017     Priority: Medium    Herniated vertebral disc 09/23/2008     Priority: Medium      Past Medical History:   Diagnosis Date    Atrial fibrillation (H)     Dyslipidemia     Hyperlipidemia     Paroxysmal atrial fibrillation (H)     Perianal abscess 05/20/2023     Past Surgical History:   Procedure Laterality Date    ABCESS DRAINAGE  05/2023    Perianal    CARDIOVERSION  11/12/2019    EP ABLATION N/A 12/31/2019    EP ABLATION AFLUTTER  12/31/2019    Procedure: EP Ablation Atrial Flutter;  Surgeon: Kal Hoover MD;  Location: [facility] Cath Lab;  Service: Cardiology    EP ABLATION PVI Left 12/31/2019    Procedure: EP Ablation PVI;  Surgeon: Kal Hoover MD;  Location: [facility] Cath Lab;  Service: Cardiology    HC LAP,INGUINAL HERNIA REPR,INITIAL Bilateral     8 months    HC TOOTH EXTRACTION W/FORCEP      HERNIA REPAIR Bilateral      Current Outpatient Medications   Medication Sig Dispense Refill    rosuvastatin (CRESTOR) 20 MG tablet Take 1 tablet (20 mg) by mouth daily. 90 tablet 3       No Known Allergies     Social History     Tobacco Use    Smoking status: Never    Smokeless tobacco: Never   Substance Use Topics    Alcohol use: Yes     Alcohol/week: 21.0 standard drinks of alcohol     Types: 21 Glasses of wine per week     Comment: 3 drinks a day           Review of Systems  Anxious about upcoming procedure. Otherwise in usual state of health. Constitutional, neuro, ENT,  "endocrine, pulmonary, cardiac, gastrointestinal,  musculoskeletal, integument and psychiatric systems are negative.    Objective    /87 (BP Location: Left arm, Patient Position: Sitting, Cuff Size: Adult Large)   Pulse 65   Temp 98.1  F (36.7  C) (Temporal)   Resp 15   Wt 104.7 kg (230 lb 12 oz)   SpO2 98%   BMI 32.18 kg/m     Estimated body mass index is 32.18 kg/m  as calculated from the following:    Height as of 6/25/25: 1.803 m (5' 11\").    Weight as of this encounter: 104.7 kg (230 lb 12 oz).  Physical Exam  GENERAL: Appears well.   HEAD: normal  NECK: no adenopathy, no asymmetry, masses, or scars  RESP: lungs clear to auscultation - no rales, rhonchi or wheezes  CV: regular rate and rhythm, normal S1 S2, no S3 or S4, no murmur, click or rub, no peripheral edema  ABDOMEN: soft, nontender, no hepatosplenomegaly, no masses and bowel sounds normal  MS: no gross musculoskeletal defects noted, no edema  SKIN: no suspicious lesions or rashes  NEURO: Normal strength and tone, mentation intact and speech normal  PSYCH: anxious. mentation appears normal    Recent Labs   Lab Test 10/07/24  1506   A1C 5.4            Signed Electronically by: Mohsen Escalante MD  A copy of this evaluation report is provided to the requesting physician.         "

## 2025-07-08 ENCOUNTER — PATIENT OUTREACH (OUTPATIENT)
Dept: ONCOLOGY | Facility: CLINIC | Age: 50
End: 2025-07-08
Payer: COMMERCIAL

## 2025-07-08 NOTE — PROGRESS NOTES
New Patient Oncology Nurse Navigator Note     Referring provider:   Self referred     Referred to (specialty): Medical Oncology    Requested provider (if applicable):   Dr. Cruz or Dr. Beyer     Date Referral Received:   07/08/25     Evaluation for :   Bladder cancer     Clinical History (per Nurse review of records provided):    Patient of Dr. Camilo, urology, who was seen for hematuria.  He has an upcoming TURBT scheduled for 7/14/25.  Patient is self-referring.    Per discussion with patient.  It was recommended through his pcp with [facility], that he get established with medical oncology at this time.  I advised that generally medical oncology does not see non-muscle invasive bladder cancer and this appointment should happen after the pathology is back from upcoming TURBT.  Patient will discuss pathology with Dr. Camilo, and decide at that time if he would like to move forward.  We will move forward with setting up a consult with Dr. Cruz.         Records Location (Care Everywhere, Media, etc.):   Epic      Additional testing needed prior to consult:   TURBT 7/14/25    I called Juancarlos to discuss referral to oncology.  I introduced my role and reviewed what this consult visit will entail/what to expect.  I reviewed the location and gave contact numbers including new patient scheduling and clinic phone numbers.   Juancarlos has no other questions at this time.    I forwarded on referral with scheduling instructions for the following   PLAN:   SCHEDULE: DR. Cruz @ [facility], 7/28 , 7-8 AM, NEW, in person    I warm transferred call to our new patient scheduling to finalize appointment.    Guillermina Swanson, RN, BSN  Oncology New Patient Nurse Navigator   [facility]  [phone]

## 2025-07-14 ENCOUNTER — ANESTHESIA (OUTPATIENT)
Dept: SURGERY | Facility: CLINIC | Age: 50
End: 2025-07-14
Payer: COMMERCIAL

## 2025-07-14 ENCOUNTER — NURSE TRIAGE (OUTPATIENT)
Dept: NURSING | Facility: CLINIC | Age: 50
End: 2025-07-14

## 2025-07-14 ENCOUNTER — ANESTHESIA EVENT (OUTPATIENT)
Dept: SURGERY | Facility: CLINIC | Age: 50
End: 2025-07-14
Payer: COMMERCIAL

## 2025-07-14 ENCOUNTER — HOSPITAL ENCOUNTER (OUTPATIENT)
Facility: CLINIC | Age: 50
Discharge: HOME | End: 2025-07-14
Attending: UROLOGY | Admitting: UROLOGY
Payer: COMMERCIAL

## 2025-07-14 VITALS
OXYGEN SATURATION: 96 % | SYSTOLIC BLOOD PRESSURE: 130 MMHG | HEART RATE: 55 BPM | DIASTOLIC BLOOD PRESSURE: 83 MMHG | TEMPERATURE: 97.8 F | RESPIRATION RATE: 12 BRPM

## 2025-07-14 DIAGNOSIS — D49.4 BLADDER TUMOR: Primary | ICD-10-CM

## 2025-07-14 PROCEDURE — 250N000011 HC RX IP 250 OP 636: Performed by: UROLOGY

## 2025-07-14 PROCEDURE — 88342 IMHCHEM/IMCYTCHM 1ST ANTB: CPT | Mod: TC | Performed by: UROLOGY

## 2025-07-14 PROCEDURE — 258N000003 HC RX IP 258 OP 636: Performed by: NURSE ANESTHETIST, CERTIFIED REGISTERED

## 2025-07-14 PROCEDURE — 88342 IMHCHEM/IMCYTCHM 1ST ANTB: CPT | Mod: 26 | Performed by: PATHOLOGY

## 2025-07-14 PROCEDURE — 250N000011 HC RX IP 250 OP 636: Mod: JZ | Performed by: UROLOGY

## 2025-07-14 PROCEDURE — 370N000017 HC ANESTHESIA TECHNICAL FEE, PER MIN: Performed by: UROLOGY

## 2025-07-14 PROCEDURE — 272N000001 HC OR GENERAL SUPPLY STERILE: Performed by: UROLOGY

## 2025-07-14 PROCEDURE — 52240 CYSTOSCOPY AND TREATMENT: CPT | Performed by: UROLOGY

## 2025-07-14 PROCEDURE — 250N000011 HC RX IP 250 OP 636: Performed by: NURSE ANESTHETIST, CERTIFIED REGISTERED

## 2025-07-14 PROCEDURE — 51720 TREATMENT OF BLADDER LESION: CPT | Mod: 59 | Performed by: UROLOGY

## 2025-07-14 PROCEDURE — 999N000141 HC STATISTIC PRE-PROCEDURE NURSING ASSESSMENT: Performed by: UROLOGY

## 2025-07-14 PROCEDURE — 88307 TISSUE EXAM BY PATHOLOGIST: CPT | Mod: 26 | Performed by: PATHOLOGY

## 2025-07-14 PROCEDURE — 250N000025 HC SEVOFLURANE, PER MIN: Performed by: UROLOGY

## 2025-07-14 PROCEDURE — 250N000009 HC RX 250: Performed by: NURSE ANESTHETIST, CERTIFIED REGISTERED

## 2025-07-14 PROCEDURE — 250N000009 HC RX 250: Performed by: UROLOGY

## 2025-07-14 PROCEDURE — 710N000012 HC RECOVERY PHASE 2, PER MINUTE: Performed by: UROLOGY

## 2025-07-14 PROCEDURE — 360N000076 HC SURGERY LEVEL 3, PER MIN: Performed by: UROLOGY

## 2025-07-14 PROCEDURE — 710N000010 HC RECOVERY PHASE 1, LEVEL 2, PER MIN: Performed by: UROLOGY

## 2025-07-14 RX ORDER — SODIUM CHLORIDE, SODIUM LACTATE, POTASSIUM CHLORIDE, CALCIUM CHLORIDE 600; 310; 30; 20 MG/100ML; MG/100ML; MG/100ML; MG/100ML
INJECTION, SOLUTION INTRAVENOUS CONTINUOUS
Status: DISCONTINUED | OUTPATIENT
Start: 2025-07-14 | End: 2025-07-14 | Stop reason: HOSPADM

## 2025-07-14 RX ORDER — FENTANYL CITRATE 50 UG/ML
25 INJECTION, SOLUTION INTRAMUSCULAR; INTRAVENOUS EVERY 5 MIN PRN
Status: DISCONTINUED | OUTPATIENT
Start: 2025-07-14 | End: 2025-07-14 | Stop reason: HOSPADM

## 2025-07-14 RX ORDER — LABETALOL HYDROCHLORIDE 5 MG/ML
10 INJECTION, SOLUTION INTRAVENOUS
Status: DISCONTINUED | OUTPATIENT
Start: 2025-07-14 | End: 2025-07-14 | Stop reason: HOSPADM

## 2025-07-14 RX ORDER — HYDROMORPHONE HCL IN WATER/PF 6 MG/30 ML
0.2 PATIENT CONTROLLED ANALGESIA SYRINGE INTRAVENOUS EVERY 5 MIN PRN
Status: DISCONTINUED | OUTPATIENT
Start: 2025-07-14 | End: 2025-07-14 | Stop reason: HOSPADM

## 2025-07-14 RX ORDER — CEFAZOLIN SODIUM/WATER 2 G/20 ML
2 SYRINGE (ML) INTRAVENOUS SEE ADMIN INSTRUCTIONS
Status: DISCONTINUED | OUTPATIENT
Start: 2025-07-14 | End: 2025-07-14 | Stop reason: HOSPADM

## 2025-07-14 RX ORDER — NALOXONE HYDROCHLORIDE 0.4 MG/ML
0.1 INJECTION, SOLUTION INTRAMUSCULAR; INTRAVENOUS; SUBCUTANEOUS
Status: DISCONTINUED | OUTPATIENT
Start: 2025-07-14 | End: 2025-07-14 | Stop reason: HOSPADM

## 2025-07-14 RX ORDER — HYDROXYZINE HYDROCHLORIDE 25 MG/1
50 TABLET, FILM COATED ORAL EVERY 6 HOURS PRN
Status: DISCONTINUED | OUTPATIENT
Start: 2025-07-14 | End: 2025-07-14 | Stop reason: HOSPADM

## 2025-07-14 RX ORDER — FENTANYL CITRATE 50 UG/ML
INJECTION, SOLUTION INTRAMUSCULAR; INTRAVENOUS PRN
Status: DISCONTINUED | OUTPATIENT
Start: 2025-07-14 | End: 2025-07-14

## 2025-07-14 RX ORDER — KETOROLAC TROMETHAMINE 30 MG/ML
INJECTION, SOLUTION INTRAMUSCULAR; INTRAVENOUS PRN
Status: DISCONTINUED | OUTPATIENT
Start: 2025-07-14 | End: 2025-07-14

## 2025-07-14 RX ORDER — HYDROMORPHONE HYDROCHLORIDE 1 MG/ML
0.5 INJECTION, SOLUTION INTRAMUSCULAR; INTRAVENOUS; SUBCUTANEOUS EVERY 5 MIN PRN
Status: DISCONTINUED | OUTPATIENT
Start: 2025-07-14 | End: 2025-07-14 | Stop reason: HOSPADM

## 2025-07-14 RX ORDER — CEFAZOLIN SODIUM/WATER 2 G/20 ML
2 SYRINGE (ML) INTRAVENOUS
Status: DISCONTINUED | OUTPATIENT
Start: 2025-07-14 | End: 2025-07-14 | Stop reason: HOSPADM

## 2025-07-14 RX ORDER — ONDANSETRON 2 MG/ML
INJECTION INTRAMUSCULAR; INTRAVENOUS PRN
Status: DISCONTINUED | OUTPATIENT
Start: 2025-07-14 | End: 2025-07-14

## 2025-07-14 RX ORDER — DEXAMETHASONE SODIUM PHOSPHATE 10 MG/ML
4 INJECTION, SOLUTION INTRAMUSCULAR; INTRAVENOUS
Status: DISCONTINUED | OUTPATIENT
Start: 2025-07-14 | End: 2025-07-14 | Stop reason: HOSPADM

## 2025-07-14 RX ORDER — HYDROCODONE BITARTRATE AND ACETAMINOPHEN 5; 325 MG/1; MG/1
1-2 TABLET ORAL EVERY 4 HOURS PRN
Qty: 10 TABLET | Refills: 0 | Status: SHIPPED | OUTPATIENT
Start: 2025-07-14 | End: 2025-07-21

## 2025-07-14 RX ORDER — CEPHALEXIN 500 MG/1
500 CAPSULE ORAL 3 TIMES DAILY
Qty: 9 CAPSULE | Refills: 0 | Status: SHIPPED | OUTPATIENT
Start: 2025-07-14 | End: 2025-07-17

## 2025-07-14 RX ORDER — PROPOFOL 10 MG/ML
INJECTION, EMULSION INTRAVENOUS PRN
Status: DISCONTINUED | OUTPATIENT
Start: 2025-07-14 | End: 2025-07-14

## 2025-07-14 RX ORDER — MEPERIDINE HYDROCHLORIDE 25 MG/ML
12.5 INJECTION INTRAMUSCULAR; INTRAVENOUS; SUBCUTANEOUS EVERY 5 MIN PRN
Status: DISCONTINUED | OUTPATIENT
Start: 2025-07-14 | End: 2025-07-14 | Stop reason: HOSPADM

## 2025-07-14 RX ORDER — ONDANSETRON 2 MG/ML
4 INJECTION INTRAMUSCULAR; INTRAVENOUS EVERY 30 MIN PRN
Status: DISCONTINUED | OUTPATIENT
Start: 2025-07-14 | End: 2025-07-14 | Stop reason: HOSPADM

## 2025-07-14 RX ORDER — FENTANYL CITRATE 50 UG/ML
50 INJECTION, SOLUTION INTRAMUSCULAR; INTRAVENOUS EVERY 5 MIN PRN
Status: DISCONTINUED | OUTPATIENT
Start: 2025-07-14 | End: 2025-07-14 | Stop reason: HOSPADM

## 2025-07-14 RX ORDER — LIDOCAINE HYDROCHLORIDE 20 MG/ML
INJECTION, SOLUTION INFILTRATION; PERINEURAL PRN
Status: DISCONTINUED | OUTPATIENT
Start: 2025-07-14 | End: 2025-07-14

## 2025-07-14 RX ORDER — DEXAMETHASONE SODIUM PHOSPHATE 10 MG/ML
INJECTION, SOLUTION INTRAMUSCULAR; INTRAVENOUS PRN
Status: DISCONTINUED | OUTPATIENT
Start: 2025-07-14 | End: 2025-07-14

## 2025-07-14 RX ORDER — ATROPA BELLADONNA AND OPIUM 16.2; 3 MG/1; MG/1
30 SUPPOSITORY RECTAL EVERY 6 HOURS PRN
Refills: 0 | Status: DISCONTINUED | OUTPATIENT
Start: 2025-07-14 | End: 2025-07-14 | Stop reason: HOSPADM

## 2025-07-14 RX ORDER — ALBUTEROL SULFATE 0.83 MG/ML
2.5 SOLUTION RESPIRATORY (INHALATION) EVERY 4 HOURS PRN
Status: DISCONTINUED | OUTPATIENT
Start: 2025-07-14 | End: 2025-07-14 | Stop reason: HOSPADM

## 2025-07-14 RX ORDER — ONDANSETRON 4 MG/1
4 TABLET, ORALLY DISINTEGRATING ORAL EVERY 30 MIN PRN
Status: DISCONTINUED | OUTPATIENT
Start: 2025-07-14 | End: 2025-07-14 | Stop reason: HOSPADM

## 2025-07-14 RX ORDER — HYDROXYZINE HYDROCHLORIDE 25 MG/1
25 TABLET, FILM COATED ORAL EVERY 6 HOURS PRN
Status: DISCONTINUED | OUTPATIENT
Start: 2025-07-14 | End: 2025-07-14 | Stop reason: HOSPADM

## 2025-07-14 RX ADMIN — LIDOCAINE HYDROCHLORIDE 50 MG: 20 INJECTION, SOLUTION INFILTRATION; PERINEURAL at 14:42

## 2025-07-14 RX ADMIN — Medication 2 G: at 14:32

## 2025-07-14 RX ADMIN — DEXMEDETOMIDINE HYDROCHLORIDE 10 MCG: 100 INJECTION, SOLUTION INTRAVENOUS at 14:57

## 2025-07-14 RX ADMIN — DEXAMETHASONE SODIUM PHOSPHATE 5 MG: 10 INJECTION, SOLUTION INTRAMUSCULAR; INTRAVENOUS at 14:52

## 2025-07-14 RX ADMIN — FENTANYL CITRATE 100 MCG: 50 INJECTION INTRAMUSCULAR; INTRAVENOUS at 14:39

## 2025-07-14 RX ADMIN — SODIUM CHLORIDE, SODIUM LACTATE, POTASSIUM CHLORIDE, AND CALCIUM CHLORIDE: .6; .31; .03; .02 INJECTION, SOLUTION INTRAVENOUS at 14:24

## 2025-07-14 RX ADMIN — PROPOFOL 100 MG: 10 INJECTION, EMULSION INTRAVENOUS at 14:45

## 2025-07-14 RX ADMIN — ONDANSETRON 4 MG: 2 INJECTION INTRAMUSCULAR; INTRAVENOUS at 14:52

## 2025-07-14 RX ADMIN — ROCURONIUM BROMIDE 50 MG: 50 INJECTION, SOLUTION INTRAVENOUS at 14:45

## 2025-07-14 RX ADMIN — PROPOFOL 200 MG: 10 INJECTION, EMULSION INTRAVENOUS at 14:42

## 2025-07-14 RX ADMIN — KETOROLAC TROMETHAMINE 30 MG: 30 INJECTION, SOLUTION INTRAMUSCULAR at 15:12

## 2025-07-14 RX ADMIN — MIDAZOLAM 2 MG: 1 INJECTION INTRAMUSCULAR; INTRAVENOUS at 14:36

## 2025-07-14 RX ADMIN — Medication 200 MG: at 15:12

## 2025-07-14 RX ADMIN — HYDROMORPHONE HYDROCHLORIDE 0.5 MG: 1 INJECTION, SOLUTION INTRAMUSCULAR; INTRAVENOUS; SUBCUTANEOUS at 15:06

## 2025-07-14 ASSESSMENT — ACTIVITIES OF DAILY LIVING (ADL)
ADLS_ACUITY_SCORE: 46
ADLS_ACUITY_SCORE: 48
ADLS_ACUITY_SCORE: 46
ADLS_ACUITY_SCORE: 48
ADLS_ACUITY_SCORE: 48

## 2025-07-14 ASSESSMENT — ENCOUNTER SYMPTOMS: DYSRHYTHMIAS: 1

## 2025-07-14 NOTE — BRIEF OP NOTE
[facility]    Brief Operative Note    Pre-operative diagnosis: Bladder tumor [D49.4]  Post-operative diagnosis Same as pre-operative diagnosis    Procedure: CYSTOSCOPY, WITH TRANSURETHRAL RESECTION OF BLADDER TUMOR AND INSTILLATION OF CHEMOTHERAPEUTIC AGENT INTO BLADDER, N/A - Urethra    Surgeon: Surgeons and Role:     * Bebo Camilo MD - Primary  Anesthesia: General   Estimated Blood Loss: Minimal    Drains: None  Specimens:   ID Type Source Tests Collected by Time Destination   1 : bladder tumor Transurethral Resection (TUR) Urinary Bladder SURGICAL PATHOLOGY EXAM Bebo Camilo MD 7/14/2025  3:12 PM      Findings:   None.  Complications: None.  Implants: * No implants in log *

## 2025-07-14 NOTE — ANESTHESIA PREPROCEDURE EVALUATION
Anesthesia Pre-Procedure Evaluation    Patient: Juancarlos Causey   MRN: [id number] : 1975          Procedure : Procedure(s):  CYSTOSCOPY, WITH TRANSURETHRAL RESECTION OF BLADDER TUMOR AND INSTILLATION OF CHEMOTHERAPEUTIC AGENT INTO BLADDER         Past Medical History:   Diagnosis Date    Atrial fibrillation (H)     Dyslipidemia     Hyperlipidemia     Paroxysmal atrial fibrillation (H)     Perianal abscess 2023      Past Surgical History:   Procedure Laterality Date    ABCESS DRAINAGE  2023    Perianal    CARDIOVERSION  2019    EP ABLATION N/A 2019    EP ABLATION AFLUTTER  2019    Procedure: EP Ablation Atrial Flutter;  Surgeon: Kal Hoover MD;  Location: [facility] Cath Lab;  Service: Cardiology    EP ABLATION PVI Left 2019    Procedure: EP Ablation PVI;  Surgeon: Kal Hoover MD;  Location: [facility] Cath Lab;  Service: Cardiology    HC LAP,INGUINAL HERNIA REPR,INITIAL Bilateral     8 months    HC TOOTH EXTRACTION W/FORCEP      HERNIA REPAIR Bilateral       No Known Allergies   Social History     Tobacco Use    Smoking status: Never    Smokeless tobacco: Never   Substance Use Topics    Alcohol use: Yes     Alcohol/week: 21.0 standard drinks of alcohol     Types: 21 Glasses of wine per week     Comment: 3 drinks a day      Wt Readings from Last 1 Encounters:   25 104.7 kg (230 lb 12 oz)        Anesthesia Evaluation            ROS/MED HX  ENT/Pulmonary:  - neg pulmonary ROS     Neurologic:  - neg neurologic ROS     Cardiovascular:     (+)  - -   -  - -                        dysrhythmias, a-fib,             METS/Exercise Tolerance:     Hematologic:  - neg hematologic  ROS     Musculoskeletal:  - neg musculoskeletal ROS     GI/Hepatic:  - neg GI/hepatic ROS     Renal/Genitourinary:     (+)       Nephrolithiasis ,       Endo:  - neg endo ROS     Psychiatric/Substance Use:     (+)   alcohol abuse      Infectious Disease:  - neg infectious disease ROS    "  Malignancy:   (+) Malignancy, History of Other.    Other:              Physical Exam  Airway  Mallampati: II  TM distance: >3 FB  Neck ROM: full  Upper bite lip test: II  Mouth opening: >= 4 cm    Cardiovascular - normal exam  Rhythm: regular  Rate: normal rate     Dental     Pulmonary - normal examBreath sounds clear to auscultation        Neurological - normal exam  He appears awake, alert and oriented x3.    Other Findings       OUTSIDE LABS:  CBC:   Lab Results   Component Value Date    WBC 5.1 03/22/2024    WBC 12.3 (H) 05/21/2023    HGB 15.1 03/22/2024    HGB 13.4 05/21/2023    HCT 45.4 03/22/2024    HCT 40.1 05/21/2023     03/22/2024     05/21/2023     BMP:   Lab Results   Component Value Date     03/22/2024     05/21/2023    POTASSIUM 4.5 03/22/2024    POTASSIUM 3.7 05/21/2023    CHLORIDE 103 03/22/2024    CHLORIDE 104 05/21/2023    CO2 25 03/22/2024    CO2 25 05/21/2023    BUN 9.2 03/22/2024    BUN 8.5 05/21/2023    CR 0.65 (L) 03/22/2024    CR 0.67 05/21/2023    GLC 96 03/22/2024     (H) 07/03/2023     COAGS: No results found for: \"PTT\", \"INR\", \"FIBR\"  POC: No results found for: \"BGM\", \"HCG\", \"HCGS\"  HEPATIC:   Lab Results   Component Value Date    ALBUMIN 4.6 07/03/2023    PROTTOTAL 7.7 07/03/2023    ALT 30 07/03/2023    AST 42 07/03/2023    ALKPHOS 59 07/03/2023    BILITOTAL 0.5 07/03/2023     OTHER:   Lab Results   Component Value Date    PH 7.40 12/31/2019    LACT 1.2 05/21/2023    A1C 5.4 10/07/2024    ARPITA 9.4 03/22/2024    MAG 2.1 04/23/2019    TSH 1.42 04/23/2019    CRP 0.7 11/25/2019       Anesthesia Plan    ASA Status:  2      NPO Status: NPO Appropriate   Anesthesia Type: General.  Airway: oral, supraglottic airway.  Induction: intravenous.  Maintenance: Balanced.   Techniques and Equipment:     - Airway:  Planned airway equipment includes supraglottic airway.     - Monitoring Plan: standard ASA monitoring     Consents    Anesthesia Plan(s) and associated risks, " "benefits, and realistic alternatives discussed. Questions answered and patient/representative(s) expressed understanding.     - Discussed: CRNA     - Discussed with:  Patient        - Pt is DNR/DNI Status: no DNR          Postoperative Care    Pain management: multimodal analgesia.     Comments:    Other Comments: The risks and benefits of anesthesia, and the alternatives where applicable, have been discussed with the patient, and they wish to proceed.                  LINDSEY Sevilla CRNA    I have reviewed the pertinent notes and labs in the chart from the past 30 days and (re)examined the patient.  Any updates or changes from those notes are reflected in this note.    Clinically Significant Risk Factors Present on Admission                             # Obesity: Estimated body mass index is 32.18 kg/m  as calculated from the following:    Height as of 6/25/25: 1.803 m (5' 11\").    Weight as of 7/7/25: 104.7 kg (230 lb 12 oz).                    "

## 2025-07-14 NOTE — PROGRESS NOTES
Pt had 400 ml of bloody urine output in PACU. He has had about 100 ml of bright bloody drainage since arrival in phase 2. Pt has had some bloody leakage around the fermin tubing at the tip of his penis. Dr Camilo notified and is ok with discharge. Pt and wife verbalize understanding of discontinue instructions. Discontinue to home via w/c, escorted to main entrance by RN.

## 2025-07-14 NOTE — ANESTHESIA CARE TRANSFER NOTE
Patient: Juancarlos Causey    Procedure: Procedure(s):  CYSTOSCOPY, WITH TRANSURETHRAL RESECTION OF BLADDER TUMOR AND INSTILLATION OF CHEMOTHERAPEUTIC AGENT INTO BLADDER       Diagnosis: Bladder tumor [D49.4]  Diagnosis Additional Information: No value filed.    Anesthesia Type:   General     Note:    Oropharynx: oropharynx clear of all foreign objects and spontaneously breathing  Level of Consciousness: drowsy  Oxygen Supplementation: face mask    Independent Airway: airway patency satisfactory and stable  Dentition: dentition unchanged  Vital Signs Stable: post-procedure vital signs reviewed and stable  Report to RN Given: handoff report given  Patient transferred to: PACU    Handoff Report: Identifed the Patient, Identified the Reponsible Provider, Reviewed the pertinent medical history, Discussed the surgical course, Reviewed Intra-OP anesthesia mangement and issues during anesthesia, Set expectations for post-procedure period and Allowed opportunity for questions and acknowledgement of understanding      Vitals:  Vitals Value Taken Time   /89 07/14/25 15:35   Temp 97.8  F (36.6  C) 07/14/25 15:28   Pulse 63 07/14/25 15:38   Resp 11 07/14/25 15:38   SpO2 93 % 07/14/25 15:38   Vitals shown include unfiled device data.    Electronically Signed By: LINDSEY Sevilla CRNA  July 14, 2025  3:40 PM

## 2025-07-14 NOTE — OP NOTE
Preop diagnosis: Bladder tumors    Postop diagnosis: Same    Procedure done:  #1 transurethral resection of bladder tumor.> 5 cm  #2 instillation of mitomycin    Procedure    Patient brought into the OR suite and placed in a dorsolithotomy position after general anesthesia has been induced.  He was draped and prepped sterilely.  He received preoperative antibiotics.  A resectoscope was then placed into the bladder and direct vision.  The urethra was unremarkable.  The prostate has mild growth.  Within the bladder there was a medium sized tumor on the left lateral posterior wall with flat tumor extended medially toward the posterior wall.  Combined size was used which was great you stop by here bladder ready for you to click here probably would need easily over 5 cm.  Using the resectoscope these were resected without any difficulty.  All bleeding controlled.  Specimen sent to surgical pathology.  A 16 English Esparza cath was then placed into the bladder.  This followed by placement of 40 mcg of mitomycin.  Patient tolerated procedure well.  No complications identified during the procedure.  There was minimal bleeding during the operation.

## 2025-07-14 NOTE — DISCHARGE INSTRUCTIONS
[facility] Same-Day Surgery   Adult Discharge Orders & Instructions     For 24 hours after surgery    Get plenty of rest.  A responsible adult must stay with you for at least 24 hours after you leave the hospital.   Do not drive or use heavy equipment.  If you have weakness or tingling, don't drive or use heavy equipment until this feeling goes away.  Do not drink alcohol.  Avoid strenuous or risky activities.  Ask for help when climbing stairs.   You may feel lightheaded.  If so, sit for a few minutes before standing.  Have someone help you get up.   You may have a slight fever. Call the doctor if your fever is over 100 F (37.7 C) (taken under the tongue) or lasts longer than 24 hours.  You may have a dry mouth, a sore throat, muscle aches or trouble sleeping.  These should go away after 24 hours.  Do not make important or legal decisions.  We don t expect you to have any problems from the surgery or treatment you had today. Just in case, here s what to do if you have pain, upset stomach (nausea), bleeding or infection:  Pain:  Take medicines your doctor has prescribed or over-the-counter medicine they have suggested. Resting and using ice packs can help, too. For surgery on an arm or leg, raise it on a pillow to ease swelling. Call your doctor if these methods don t work.  Copyright Oscar Orantes, Licensed under [employer]  Upset stomach (nausea):  Take anti-nausea medicine approved by your doctor. Drink clear liquids like apple juice, ginger ale, broth or 7-Up. Be sure to drink enough fluids. Rest can help, too. Move to normal foods when you re ready.   Bleeding:  In the first 24 hours, you may see a little blood on your dressing, about the size of a quarter. You don t need to worry about this much blood, but if the blood spot keeps getting bigger:  Put pressure on the wound if you can, AND  Call your doctor.  Copyright [employer], Licensed under [employer]  Fever/Infection: Please call  your doctor if you have any of these signs:  Redness  Swelling  Wound feels warm  Pain gets worse  Bad-smelling fluid leaks from wound  Fever or chills  Call your doctor for any of the followin.  It has been over 8 to 10 hours since surgery and you are still not able to urinate (pass water).    2.  Headache for over 24 hours.    3.  Numbness, tingling or weakness in your legs the day after surgery (if you had spinal anesthesia).    For patient questions :        Urology: [phone]    Discharge Instructions Following Cystoscopy   Dr. Camilo    Following your cystoscopy today, you may experience:  1. Small amounts of bleeding.  2. A mild burning sensation, especially with voiding for a day or two.    To relieve these symptoms:  1. Drink fluids to keep your urine clear.    2. Rest.    If you have any heavy bleeding, please call our office at [phone]   For post operative appointments please call [phone]

## 2025-07-14 NOTE — ANESTHESIA POSTPROCEDURE EVALUATION
Patient: Juancarlos Causey    Procedure: Procedure(s):  CYSTOSCOPY, WITH TRANSURETHRAL RESECTION OF BLADDER TUMOR AND INSTILLATION OF CHEMOTHERAPEUTIC AGENT INTO BLADDER       Anesthesia Type:  General    Note:  Disposition: Outpatient   Postop Pain Control: Uneventful            Sign Out: Well controlled pain   PONV: No   Neuro/Psych: Uneventful            Sign Out: Acceptable/Baseline neuro status   Airway/Respiratory: Uneventful            Sign Out: Acceptable/Baseline resp. status   CV/Hemodynamics: Uneventful            Sign Out: Acceptable CV status   Other NRE: NONE   DID A NON-ROUTINE EVENT OCCUR? No    Event details/Postop Comments:  Pt was happy with anesthesia care.  No complications.  I will follow up with the pt if needed.           Last vitals:  Vitals Value Taken Time   /81 07/14/25 16:00   Temp 97.8  F (36.6  C) 07/14/25 15:28   Pulse 60 07/14/25 16:01   Resp 12 07/14/25 16:01   SpO2 93 % 07/14/25 16:01   Vitals shown include unfiled device data.    Electronically Signed By: LINDSEY Sevilla CRNA  July 14, 2025  4:11 PM

## 2025-07-14 NOTE — ANESTHESIA PROCEDURE NOTES
Airway       Patient location during procedure: OR       Procedure Start/Stop Times: 7/14/2025 2:49 PM  Staff -        Performed By: CRNA  Consent for Airway        Urgency: elective  Indications and Patient Condition       Indications for airway management: maryann-procedural       Induction type:intravenous       Mask difficulty assessment: 1 - vent by mask    Final Airway Details       Final airway type: endotracheal airway       Successful airway: ETT - single  Endotracheal Airway Details        ETT size (mm): 8.0       Cuffed: yes       Successful intubation technique: video laryngoscopy       VL Blade Size: Glidescope 4       Grade View of Cords: 1       Adjucts: stylet       Position: Right       Measured from: lips       Secured at (cm): 24       Bite block used: Oral Airway    Post intubation assessment        Placement verified by: capnometry, equal breath sounds and chest rise        Number of attempts at approach: 1       Number of other approaches attempted: 0       Secured with: plastic tape       Ease of procedure: easy       Dentition: Intact and Unchanged    Medication(s) Administered   Medication Administration Time: 7/14/2025 2:49 PM

## 2025-07-15 NOTE — TELEPHONE ENCOUNTER
RECORDS STATUS - ALL OTHER DIAGNOSIS      RECORDS RECEIVED FROM: [facility]   NOTES STATUS DETAILS   OFFICE NOTE from referring provider Epic 7/2/25: Dr. Bebo Camilo   OPERATIVE REPORT [facility] 7/14/25: CYSTOSCOPY    MEDICATION LIST [facility]    LABS     PATHOLOGY REPORTS Report in Epic 7/14/25: IN PROCESS   ANYTHING RELATED TO DIAGNOSIS Epic Most recent 6/25/25   IMAGING (NEED IMAGES & REPORT)     CT SCANS PACS 6/25/25: CT Urogram

## 2025-07-15 NOTE — TELEPHONE ENCOUNTER
Nurse Triage SBAR    Situation: Post op concern - catheter concern    Background: Patient calling. He had a Cystoscopy and a removal of a lump in his bladder. He had a catheter while their and he had some blood and clots in the output. He has been discharged for about 2 hours. He has about 30ml in his bag from when he left.    Assessment: Difficulty with urination. Painful at the tip of his penis. No urine in his catheter bag. Their is blood coming out of the penis outside of the catheter. He has has about 1/2 gallon of water. He vomited when he got home. They have not really seen any new urine out up. He feels like there is something caught internally as he has tried to urinate with the catheter in. Temp: 97.5.     Protocol Recommended Disposition: See Physician within 4 hours (Or PCP Triage)    Recommendation: According to the protocol, Patient should be seen within 4 hours (Or PCP Triage). Advised Patient that the patient needs to wait for a call-back after nurse speaks with the on-call Provider. Care advice given. Patient verbalizes understanding and agrees with plan of care.     [place] [facility] Provider consult indicated.    Reason for page: Catheter concern    Specialty Group number: [id number]  Specialty Group: Urology    Initial page sent to Dr. Camilo by RN at 7:58pm.     [place] [facility] Provider, Dr. Camilo, returning page to Nurse Advisors at 8:04pm.     Provider recommended plan of care: The blood around the catheter is normal. Take something for the pain. He should drink more water. If there is no urine output in the next few hours then he should be seen in the ED.     Provider Recommendation Follow Up:   Reached patient/caregiver. Informed of provider's recommendations. Patient verbalized understanding and agrees with the plan.     Stephanie Nielsen, RN Nursing Advisor 7/14/2025 8:08 PM     Reason for Disposition   Bleeding around catheter (e.g., from penis or female urethra)    Additional Information    Negative: Shock suspected (e.g., cold/pale/clammy skin, too weak to stand, low BP, rapid pulse)   Negative: Sounds like a life-threatening emergency to the triager   Negative: Urinary catheter and spinal cord injury, questions about   Negative: Urinary catheter in a hospice patient   Negative: [1] Catheter was accidentally pulled-out AND [2] bright red continuous bleeding (or trickling)   Negative: SEVERE abdominal pain   Negative: Fever > 100.4 F (38.0 C)   Negative: [1] Drinking very little AND [2] dehydration suspected (e.g., no urine > 12 hours, very dry mouth, very lightheaded)   Negative: Patient sounds very sick or weak to the triager   Negative: Catheter was accidentally pulled-out   Negative: [1] Catheter is broken AND [2] is not working (does not function normally)    Protocols used: Urinary Catheter (e.g., Esparza) Symptoms and [id number]

## 2025-07-17 LAB
PATH REPORT.COMMENTS IMP SPEC: ABNORMAL
PATH REPORT.COMMENTS IMP SPEC: ABNORMAL
PATH REPORT.COMMENTS IMP SPEC: YES
PATH REPORT.FINAL DX SPEC: ABNORMAL
PATH REPORT.GROSS SPEC: ABNORMAL
PATH REPORT.MICROSCOPIC SPEC OTHER STN: ABNORMAL
PATH REPORT.RELEVANT HX SPEC: ABNORMAL
PHOTO IMAGE: ABNORMAL

## 2025-07-18 ENCOUNTER — MYC MEDICAL ADVICE (OUTPATIENT)
Dept: FAMILY MEDICINE | Facility: CLINIC | Age: 50
End: 2025-07-18

## 2025-07-18 DIAGNOSIS — C67.9 MALIGNANT NEOPLASM OF URINARY BLADDER, UNSPECIFIED SITE (H): Primary | ICD-10-CM

## 2025-07-21 ENCOUNTER — TELEPHONE (OUTPATIENT)
Dept: UROLOGY | Facility: CLINIC | Age: 50
End: 2025-07-21

## 2025-07-21 ENCOUNTER — OFFICE VISIT (OUTPATIENT)
Dept: UROLOGY | Facility: CLINIC | Age: 50
End: 2025-07-21
Payer: COMMERCIAL

## 2025-07-21 ENCOUNTER — HOSPITAL ENCOUNTER (OUTPATIENT)
Facility: CLINIC | Age: 50
End: 2025-07-21
Attending: UROLOGY | Admitting: UROLOGY
Payer: COMMERCIAL

## 2025-07-21 ENCOUNTER — PREP FOR PROCEDURE (OUTPATIENT)
Dept: SURGERY | Facility: CLINIC | Age: 50
End: 2025-07-21

## 2025-07-21 ENCOUNTER — HOSPITAL ENCOUNTER (OUTPATIENT)
Dept: GENERAL RADIOLOGY | Facility: CLINIC | Age: 50
Discharge: HOME | End: 2025-07-21
Attending: UROLOGY | Admitting: UROLOGY
Payer: COMMERCIAL

## 2025-07-21 VITALS
HEIGHT: 71 IN | BODY MASS INDEX: 32.2 KG/M2 | DIASTOLIC BLOOD PRESSURE: 76 MMHG | WEIGHT: 230 LBS | SYSTOLIC BLOOD PRESSURE: 118 MMHG | TEMPERATURE: 98 F

## 2025-07-21 DIAGNOSIS — C67.9 MALIGNANT NEOPLASM OF URINARY BLADDER, UNSPECIFIED SITE (H): Primary | ICD-10-CM

## 2025-07-21 DIAGNOSIS — C67.9 MALIGNANT NEOPLASM OF URINARY BLADDER, UNSPECIFIED SITE (H): ICD-10-CM

## 2025-07-21 PROCEDURE — 1125F AMNT PAIN NOTED PAIN PRSNT: CPT | Performed by: UROLOGY

## 2025-07-21 PROCEDURE — 86481 TB AG RESPONSE T-CELL SUSP: CPT | Performed by: UROLOGY

## 2025-07-21 PROCEDURE — 3078F DIAST BP <80 MM HG: CPT | Performed by: UROLOGY

## 2025-07-21 PROCEDURE — 99213 OFFICE O/P EST LOW 20 MIN: CPT | Performed by: UROLOGY

## 2025-07-21 PROCEDURE — 71046 X-RAY EXAM CHEST 2 VIEWS: CPT

## 2025-07-21 PROCEDURE — 3074F SYST BP LT 130 MM HG: CPT | Performed by: UROLOGY

## 2025-07-21 ASSESSMENT — PAIN SCALES - GENERAL: PAINLEVEL_OUTOF10: MILD PAIN (2)

## 2025-07-21 NOTE — TELEPHONE ENCOUNTER
"Type of surgery: CYSTOSCOPY, WITH TRANSURETHRAL RESECTION BLADDER TUMOR   Location of surgery: [facility]  Date and time of surgery: 8/6  Surgeon: Leslee  Pre-Op Appt Date: 7/7  Post-Op Appt Date: NA    Packet sent out: Yes rony gold per spouse nickie   Pre-cert/Authorization completed:  Not Applicable  Date: na  Spouse requested to speak to anesthesia prior to surgery day. I informed I will contact OR and have someone get in touch with patient.  States \"due to past complications I want to make sure he has proper prep and proper medication\"  Message sent to Raul Bowers and Charles Rodriguez and charge DINO talamantes per Jessica in OR.    "

## 2025-07-21 NOTE — PROGRESS NOTES
Chief Complaint   Patient presents with    Follow Up     Post op BTR       Juancarlos Causey is a 49 year old male who presents today for follow up of   Chief Complaint   Patient presents with    Follow Up     Post op BTR    Follow up post TURBT.  He is without any complaints.    Current Outpatient Medications   Medication Sig Dispense Refill    rosuvastatin (CRESTOR) 20 MG tablet Take 1 tablet (20 mg) by mouth daily. 90 tablet 3     No Known Allergies   Past Medical History:   Diagnosis Date    Atrial fibrillation (H)     Dyslipidemia     Hyperlipidemia     Paroxysmal atrial fibrillation (H)     Perianal abscess 05/20/2023     Past Surgical History:   Procedure Laterality Date    ABCESS DRAINAGE  05/2023    Perianal    CARDIOVERSION  11/12/2019    CYSTOSCOPY, TRANSURETHRAL RESECTION (TUR) TUMOR BLADDER INSTILL CHEMOTHERAPY, COMBINED N/A 7/14/2025    Procedure: CYSTOSCOPY, WITH TRANSURETHRAL RESECTION OF BLADDER TUMOR AND INSTILLATION OF CHEMOTHERAPEUTIC AGENT INTO BLADDER;  Surgeon: Bebo Camilo MD;  Location: [facility] OR    EP ABLATION N/A 12/31/2019    EP ABLATION AFLUTTER  12/31/2019    Procedure: EP Ablation Atrial Flutter;  Surgeon: Kal Hoover MD;  Location: [facility];  Service: Cardiology    EP ABLATION PVI Left 12/31/2019    Procedure: EP Ablation PVI;  Surgeon: Kal Hoover MD;  Location: [facility] Cath [facility];  Service: Cardiology    HC LAP,INGUINAL HERNIA REPR,INITIAL Bilateral     8 months    HC TOOTH EXTRACTION W/FORCEP      HERNIA REPAIR Bilateral      Family History   Problem Relation Age of Onset    Colon Cancer Mother 45    Breast Cancer Mother 82    Atrial fibrillation Father     Urinary tract infection Father         recurrent    Diabetes Type 2  Father     Hyperlipidemia Sister     No Known Problems Sister     No Known Problems Brother     Hyperlipidemia Maternal Grandmother     Arthritis Maternal Grandmother     Prostate Cancer No family hx of     Coronary Artery Disease  No family hx of     Cerebrovascular Disease No family hx of      Social History     Socioeconomic History    Marital status:      Spouse name: None    Number of children: None    Years of education: None    Highest education level: None   Tobacco Use    Smoking status: Never    Smokeless tobacco: Never   Vaping Use    Vaping status: Never Used   Substance and Sexual Activity    Alcohol use: Yes     Alcohol/week: 21.0 standard drinks of alcohol     Types: 21 Glasses of wine per week     Comment: 3 drinks a day    Drug use: Not Currently    Sexual activity: Yes     Partners: Female   Social History Narrative    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 [employer]    Lives with wife (professor at [employer]) and two kids (17 and 13 as of 07/2023)     Social Drivers of Health     Financial Resource Strain: Low Risk  (10/7/2024)    Financial Resource Strain     Within the past 12 months, have you or your family members you live with been unable to get utilities (heat, electricity) when it was really needed?: No   Food Insecurity: Low Risk  (10/7/2024)    Food Insecurity     Within the past 12 months, did you worry that your food would run out before you got money to buy more?: No     Within the past 12 months, did the food you bought just not last and you didn t have money to get more?: No   Transportation Needs: Low Risk  (10/7/2024)    Transportation Needs     Within the past 12 months, has lack of transportation kept you from medical appointments, getting your medicines, non-medical meetings or appointments, work, or from getting things that you need?: No   Physical Activity: Unknown (10/7/2024)    Exercise Vital Sign     Days of Exercise per Week: 5 days   Stress: No Stress Concern Present (10/7/2024)    [employer] - Occupational Stress Questionnaire     Feeling of Stress : Only a little   Social Connections: Unknown  "(10/7/2024)    Social Connection and Isolation Panel [NHANES]     Frequency of Social Gatherings with Friends and Family: More than three times a week   Interpersonal Safety: Low Risk  (7/14/2025)    Interpersonal Safety     Do you feel physically and emotionally safe where you currently live?: Yes     Within the past 12 months, have you been hit, slapped, kicked or otherwise physically hurt by someone?: No     Within the past 12 months, have you been humiliated or emotionally abused in other ways by your partner or ex-partner?: No   Housing Stability: Low Risk  (10/7/2024)    Housing Stability     Do you have housing? : Yes     Are you worried about losing your housing?: No       REVIEW OF SYSTEMS  =================  C: NEGATIVE for fever, chills, change in weight  I: NEGATIVE for worrisome rashes, moles or lesions  E/M: NEGATIVE for ear, mouth and throat problems  R: NEGATIVE for significant cough or SHORTNESS OF BREATH  CV:  NEGATIVE for chest pain, palpitations or peripheral edema  GI: NEGATIVE for nausea, abdominal pain, heartburn, or change in bowel habits  NEURO: NEGATIVE numbness/weakness  : see HPI  PSYCH: NEGATIVE depression/anxiety  LYmph: no new enlarged lymph nodes  Ortho: no new trauma/movements    Physical Exam:  Blood pressure 118/76, temperature 98  F (36.7  C), temperature source Temporal, height 1.803 m (5' 11\"), weight 104.3 kg (230 lb).    GENERAL: healthy, alert and no distress  EYES: Eyes grossly normal to inspection, conjunctivae and sclerae normal  RESP: no audible wheeze, cough, or visible cyanosis.  No visible retractions or increased work of breathing.  Able to speak fully in complete sentences.  NEURO: Cranial nerves grossly intact, mentation intact and speech normal  PSYCH: mentation appears normal, affect normal/bright, judgement and insight intact, normal speech and appearance well-groomed    Component  Ref Range & Units    Case Report   Surgical Pathology Report                     "     Case: [id number]                                   Authorizing Provider:  Bebo Camilo MD     Collected:           07/14/2025 03:12 PM           Ordering Location:     [facility]          Received:            07/14/2025 03:12 PM                                  [facility] OR                                                                 Pathologist:           Tana Patel MD                                                                           Specimen:    Urinary Bladder, bladder tumor                                                            Final Diagnosis   Urinary bladder, tumor, transurethral resection:  - High-grade urothelial carcinoma with extensive lamina propria invasion, muscularis propria present in tissue sample without evidence of invasion       Assessment/Plan:   (C67.9) Malignant neoplasm of urinary bladder, unspecified site (H)  (primary encounter diagnosis)  Comment:  high grade TCC stage T1  Plan: Quantiferon-TB Gold Plus, XR Chest 2 Views         Discussed path report.  Schedule for repeat resection.   Discussed BCG tx with pros and cons.              Please note: Voice recognition software was used in this dictation.  It may therefore contain typographical errors.

## 2025-07-21 NOTE — PATIENT INSTRUCTIONS
Please schedule chest x-ray by calling imaging scheduling at [phone]. Please complete TB Gold labs.   Please call [phone] with any questions regarding today's visit.

## 2025-07-22 ENCOUNTER — PRE VISIT (OUTPATIENT)
Dept: UROLOGY | Facility: CLINIC | Age: 50
End: 2025-07-22
Payer: COMMERCIAL

## 2025-07-22 NOTE — TELEPHONE ENCOUNTER
Reason for visit: Bladder cancer 2nd opinion     Relevant information: Malignant neoplasm of urinary bladder    Records/imaging/labs/orders: all records available     Pt called: No need for a call    Yasmani Magallanes  7/22/2025  2:20 PM

## 2025-07-23 ENCOUNTER — ANESTHESIA EVENT (OUTPATIENT)
Dept: SURGERY | Facility: CLINIC | Age: 50
End: 2025-07-23

## 2025-07-23 LAB
GAMMA INTERFERON BACKGROUND BLD IA-ACNC: 0.06 IU/ML
M TB IFN-G BLD-IMP: NEGATIVE
M TB IFN-G CD4+ BCKGRND COR BLD-ACNC: 9.94 IU/ML
MITOGEN IGNF BCKGRD COR BLD-ACNC: 0 IU/ML
MITOGEN IGNF BCKGRD COR BLD-ACNC: 0 IU/ML
QUANTIFERON MITOGEN: 10 IU/ML
QUANTIFERON NIL TUBE: 0.06 IU/ML
QUANTIFERON TB1 TUBE: 0.06 IU/ML
QUANTIFERON TB2 TUBE: 0.06

## 2025-07-28 ENCOUNTER — PATIENT OUTREACH (OUTPATIENT)
Dept: CARE COORDINATION | Facility: CLINIC | Age: 50
End: 2025-07-28
Payer: COMMERCIAL

## 2025-07-28 ENCOUNTER — PRE VISIT (OUTPATIENT)
Dept: ONCOLOGY | Facility: CLINIC | Age: 50
End: 2025-07-28
Payer: COMMERCIAL

## 2025-07-30 ENCOUNTER — MYC MEDICAL ADVICE (OUTPATIENT)
Dept: UROLOGY | Facility: CLINIC | Age: 50
End: 2025-07-30
Payer: COMMERCIAL

## 2025-07-30 ENCOUNTER — PATIENT OUTREACH (OUTPATIENT)
Dept: CARE COORDINATION | Facility: CLINIC | Age: 50
End: 2025-07-30
Payer: COMMERCIAL

## 2025-07-31 ENCOUNTER — TELEPHONE (OUTPATIENT)
Dept: UROLOGY | Facility: CLINIC | Age: 50
End: 2025-07-31
Payer: COMMERCIAL

## 2025-07-31 NOTE — TELEPHONE ENCOUNTER
"Pt and wife called back into clinic. They stated that they are trying to get different perspectives and opinions. They may possibly be getting a 3rd opinion.     The pathology that was read by [facility] stated:       Pt had a VV with a [facility] MD who agreed with Dr. Camilo's recommendations, however he DID NOT have the pathology read out by a [facility] MD at the time of visit.     The pt received these results from [facility] on 7.30.25:    The difference is that [facility] shows \"muscularis propria present without evidence of invasion\" and the [facility] interpretation shows \"focal muscularis propria invasion.\"    The patient and his wife were on the phone call. Their biggest question:  Moving forward, with 2 differing pathology reports, what is the next and best approach? Does it change the presented plan?  He does not want to do a radical without knowing risks.    Will route to Dr. Camilo for advisement.    Jesika FERNANDEZ RN   [facility], Urology   7/31/2025 2:03 PM     "

## 2025-07-31 NOTE — TELEPHONE ENCOUNTER
MEDICAL RECORDS REQUEST   [facility]  Urology Clinic  [address] [place] [id number]  PHONE: [phone]  Fax: [phone]        FUTURE VISIT INFORMATION                                                   Juancarlos S Padilla, : 1975 scheduled for future visit at [facility]    APPOINTMENT INFORMATION:  Date: 2025  Provider:  Warren Castillo MD  Reason for Visit/Diagnosis: second opinion bladder cancer      RECORDS REQUESTED FOR VISIT                                                     NOTES  STATUS/DETAILS   OFFICE NOTE from other specialist  YES, 2025 -- MARQUIS Sibley M.D.  @ [place]  2025, 2025 -- Bebo Camilo MD @ [facility]  2025 -- Darline Thorpe APRN CNP @ [facility]    DISCHARGE SUMMARY from hospital  2025 -- [facility]    MEDICATION LIST  yes   LABS     URINALYSIS (UA)  yes   BLADDER CANCER     IMAGES  YES, 2025 -- XR CHEST  2025 -- CT UROGRAM   PATHOLOGY REPORT & SLIDES  yes, 2025 -- Urinary Bladder, bladder tumor -- Case: [id number]     PRE-VISIT CHECKLIST      Joint diagnostic appointment coordinated correctly          (ensure right order & amount of time) Yes   RECORD COLLECTION COMPLETE Yes

## 2025-07-31 NOTE — TELEPHONE ENCOUNTER
Health Call Center    Phone Message    May a detailed message be left on voicemail: no     Reason for Call: Other: Patients spouse called and stated that they want to schedule a sooner appointment with Dr Castillo if at all possible. They had to cancel due to not having the pathology report. Please call Lizzette to discuss.     Action Taken: Message routed to:  [facility] UROLOGY    Travel Screening: Not Applicable     Date of Service:

## 2025-08-05 ENCOUNTER — PRE VISIT (OUTPATIENT)
Dept: UROLOGY | Facility: CLINIC | Age: 50
End: 2025-08-05

## 2025-08-05 ENCOUNTER — OFFICE VISIT (OUTPATIENT)
Dept: UROLOGY | Facility: CLINIC | Age: 50
End: 2025-08-05
Payer: COMMERCIAL

## 2025-08-05 VITALS — HEART RATE: 59 BPM | OXYGEN SATURATION: 96 % | DIASTOLIC BLOOD PRESSURE: 86 MMHG | SYSTOLIC BLOOD PRESSURE: 136 MMHG

## 2025-08-05 DIAGNOSIS — C67.9 MALIGNANT NEOPLASM OF URINARY BLADDER, UNSPECIFIED SITE (H): ICD-10-CM

## 2025-08-05 PROCEDURE — 99215 OFFICE O/P EST HI 40 MIN: CPT | Performed by: UROLOGY

## 2025-08-05 PROCEDURE — 3079F DIAST BP 80-89 MM HG: CPT | Performed by: UROLOGY

## 2025-08-05 PROCEDURE — 3075F SYST BP GE 130 - 139MM HG: CPT | Performed by: UROLOGY

## 2025-08-05 PROCEDURE — 1126F AMNT PAIN NOTED NONE PRSNT: CPT | Performed by: UROLOGY

## 2025-08-05 ASSESSMENT — PAIN SCALES - GENERAL: PAINLEVEL_OUTOF10: NO PAIN (0)

## 2025-08-06 ENCOUNTER — ANESTHESIA (OUTPATIENT)
Dept: SURGERY | Facility: CLINIC | Age: 50
End: 2025-08-06

## 2025-08-06 ENCOUNTER — OFFICE VISIT (OUTPATIENT)
Dept: FAMILY MEDICINE | Facility: CLINIC | Age: 50
End: 2025-08-06
Payer: COMMERCIAL

## 2025-08-06 VITALS
OXYGEN SATURATION: 96 % | BODY MASS INDEX: 31.02 KG/M2 | HEIGHT: 72 IN | RESPIRATION RATE: 15 BRPM | WEIGHT: 229 LBS | HEART RATE: 75 BPM | SYSTOLIC BLOOD PRESSURE: 132 MMHG | TEMPERATURE: 97.1 F | DIASTOLIC BLOOD PRESSURE: 80 MMHG

## 2025-08-06 DIAGNOSIS — C67.9 MALIGNANT NEOPLASM OF URINARY BLADDER, UNSPECIFIED SITE (H): ICD-10-CM

## 2025-08-06 DIAGNOSIS — I48.0 PAF (PAROXYSMAL ATRIAL FIBRILLATION) (H): ICD-10-CM

## 2025-08-06 DIAGNOSIS — Z01.818 PREOP GENERAL PHYSICAL EXAM: Primary | ICD-10-CM

## 2025-08-06 LAB
ERYTHROCYTE [DISTWIDTH] IN BLOOD BY AUTOMATED COUNT: 12.5 % (ref 10–15)
HCT VFR BLD AUTO: 46.3 % (ref 40–53)
HGB BLD-MCNC: 15.8 G/DL (ref 13.3–17.7)
MCH RBC QN AUTO: 30.6 PG (ref 26.5–33)
MCHC RBC AUTO-ENTMCNC: 34.1 G/DL (ref 31.5–36.5)
MCV RBC AUTO: 90 FL (ref 78–100)
PLATELET # BLD AUTO: 259 10E3/UL (ref 150–450)
RBC # BLD AUTO: 5.17 10E6/UL (ref 4.4–5.9)
WBC # BLD AUTO: 6.2 10E3/UL (ref 4–11)

## 2025-08-06 PROCEDURE — 82310 ASSAY OF CALCIUM: CPT | Performed by: INTERNAL MEDICINE

## 2025-08-06 RX ORDER — CEFAZOLIN SODIUM 2 G/50ML
2 SOLUTION INTRAVENOUS SEE ADMIN INSTRUCTIONS
OUTPATIENT
Start: 2025-08-06

## 2025-08-06 RX ORDER — ACETAMINOPHEN 325 MG/1
975 TABLET ORAL ONCE
OUTPATIENT
Start: 2025-08-06 | End: 2025-08-06

## 2025-08-06 RX ORDER — ACETAMINOPHEN 650 MG/1
650 SUPPOSITORY RECTAL ONCE
OUTPATIENT
Start: 2025-08-06

## 2025-08-06 RX ORDER — CEFAZOLIN SODIUM 2 G/50ML
2 SOLUTION INTRAVENOUS
OUTPATIENT
Start: 2025-08-06

## 2025-08-06 ASSESSMENT — PAIN SCALES - GENERAL: PAINLEVEL_OUTOF10: NO PAIN (0)

## 2025-08-07 ENCOUNTER — HOSPITAL ENCOUNTER (OUTPATIENT)
Facility: CLINIC | Age: 50
End: 2025-08-07
Attending: UROLOGY | Admitting: UROLOGY
Payer: COMMERCIAL

## 2025-08-07 LAB
ANION GAP SERPL CALCULATED.3IONS-SCNC: 10 MMOL/L (ref 7–15)
BUN SERPL-MCNC: 14.5 MG/DL (ref 6–20)
CALCIUM SERPL-MCNC: 9.8 MG/DL (ref 8.8–10.4)
CHLORIDE SERPL-SCNC: 103 MMOL/L (ref 98–107)
CREAT SERPL-MCNC: 0.79 MG/DL (ref 0.67–1.17)
EGFRCR SERPLBLD CKD-EPI 2021: >90 ML/MIN/1.73M2
GLUCOSE SERPL-MCNC: 79 MG/DL (ref 70–99)
HCO3 SERPL-SCNC: 27 MMOL/L (ref 22–29)
POTASSIUM SERPL-SCNC: 4.3 MMOL/L (ref 3.4–5.3)
SODIUM SERPL-SCNC: 140 MMOL/L (ref 135–145)

## 2025-08-08 ENCOUNTER — MYC MEDICAL ADVICE (OUTPATIENT)
Dept: UROLOGY | Facility: CLINIC | Age: 50
End: 2025-08-08
Payer: COMMERCIAL

## 2025-08-08 DIAGNOSIS — R39.89 SUSPECTED UTI: Primary | ICD-10-CM

## 2025-08-11 PROBLEM — C67.9 MALIGNANT NEOPLASM OF URINARY BLADDER, UNSPECIFIED SITE (H): Status: ACTIVE | Noted: 2025-08-11

## 2025-08-13 ENCOUNTER — PRE VISIT (OUTPATIENT)
Dept: UROLOGY | Facility: CLINIC | Age: 50
End: 2025-08-13
Payer: COMMERCIAL

## 2025-08-14 ENCOUNTER — TELEPHONE (OUTPATIENT)
Dept: UROLOGY | Facility: CLINIC | Age: 50
End: 2025-08-14
Payer: COMMERCIAL

## 2025-08-15 ENCOUNTER — ANESTHESIA EVENT (OUTPATIENT)
Dept: SURGERY | Facility: CLINIC | Age: 50
End: 2025-08-15

## 2025-08-18 ENCOUNTER — TELEPHONE (OUTPATIENT)
Dept: UROLOGY | Facility: CLINIC | Age: 50
End: 2025-08-18
Payer: COMMERCIAL

## 2025-08-18 ENCOUNTER — ANESTHESIA (OUTPATIENT)
Dept: SURGERY | Facility: CLINIC | Age: 50
End: 2025-08-18

## 2025-08-18 DIAGNOSIS — C67.9 MALIGNANT NEOPLASM OF URINARY BLADDER, UNSPECIFIED SITE (H): Primary | ICD-10-CM

## 2025-08-20 ENCOUNTER — TELEPHONE (OUTPATIENT)
Dept: UROLOGY | Facility: CLINIC | Age: 50
End: 2025-08-20
Payer: COMMERCIAL

## 2025-08-20 ENCOUNTER — PATIENT OUTREACH (OUTPATIENT)
Dept: ONCOLOGY | Facility: CLINIC | Age: 50
End: 2025-08-20
Payer: COMMERCIAL

## 2025-08-20 ENCOUNTER — LAB REQUISITION (OUTPATIENT)
Dept: LAB | Facility: CLINIC | Age: 50
End: 2025-08-20
Payer: COMMERCIAL

## 2025-08-20 LAB
PATH REPORT.COMMENTS IMP SPEC: ABNORMAL
PATH REPORT.COMMENTS IMP SPEC: ABNORMAL
PATH REPORT.COMMENTS IMP SPEC: YES
PATH REPORT.FINAL DX SPEC: ABNORMAL
PATH REPORT.GROSS SPEC: ABNORMAL
PATH REPORT.MICROSCOPIC SPEC OTHER STN: ABNORMAL
PATH REPORT.RELEVANT HX SPEC: ABNORMAL
PATH REPORT.RELEVANT HX SPEC: ABNORMAL
PATH REPORT.SITE OF ORIGIN SPEC: ABNORMAL

## 2025-08-29 ENCOUNTER — PATIENT OUTREACH (OUTPATIENT)
Dept: ONCOLOGY | Facility: CLINIC | Age: 50
End: 2025-08-29

## 2025-08-29 DIAGNOSIS — C67.9 UROTHELIAL CARCINOMA OF BLADDER WITH INVASION OF MUSCLE (H): Primary | ICD-10-CM

## 2025-08-29 PROBLEM — Z79.899 HIGH RISK MEDICATION USE: Status: ACTIVE | Noted: 2025-08-29

## 2025-09-02 ENCOUNTER — LAB (OUTPATIENT)
Dept: LAB | Facility: CLINIC | Age: 50
End: 2025-09-02
Payer: COMMERCIAL

## 2025-09-02 ENCOUNTER — OFFICE VISIT (OUTPATIENT)
Dept: AUDIOLOGY | Facility: CLINIC | Age: 50
End: 2025-09-02
Attending: INTERNAL MEDICINE
Payer: COMMERCIAL

## 2025-09-02 DIAGNOSIS — H93.293 ABNORMAL AUDITORY PERCEPTION OF BOTH EARS: Primary | ICD-10-CM

## 2025-09-02 DIAGNOSIS — H91.03 OTOTOXIC HEARING LOSS OF BOTH EARS: ICD-10-CM

## 2025-09-02 DIAGNOSIS — Z79.899 HIGH RISK MEDICATION USE: ICD-10-CM

## 2025-09-02 DIAGNOSIS — C67.9 UROTHELIAL CARCINOMA OF BLADDER WITH INVASION OF MUSCLE (H): ICD-10-CM

## 2025-09-02 LAB
ANION GAP SERPL CALCULATED.3IONS-SCNC: 10 MMOL/L (ref 7–15)
BUN SERPL-MCNC: 12.7 MG/DL (ref 6–20)
CALCIUM SERPL-MCNC: 9.8 MG/DL (ref 8.8–10.4)
CHLORIDE SERPL-SCNC: 101 MMOL/L (ref 98–107)
CREAT SERPL-MCNC: 0.77 MG/DL (ref 0.67–1.17)
CREAT SERPL-MCNC: 0.77 MG/DL (ref 0.67–1.17)
EGFRCR SERPLBLD CKD-EPI 2021: >90 ML/MIN/1.73M2
GLUCOSE SERPL-MCNC: 113 MG/DL (ref 70–99)
HCO3 SERPL-SCNC: 27 MMOL/L (ref 22–29)
POTASSIUM SERPL-SCNC: 4.5 MMOL/L (ref 3.4–5.3)
SODIUM SERPL-SCNC: 138 MMOL/L (ref 135–145)

## 2025-09-02 PROCEDURE — 80048 BASIC METABOLIC PNL TOTAL CA: CPT | Performed by: PATHOLOGY

## 2025-09-02 PROCEDURE — 36415 COLL VENOUS BLD VENIPUNCTURE: CPT | Performed by: PATHOLOGY

## 2025-09-04 LAB
COLLECT DURATION TIME UR: 24 H
CREAT 24H UR-MRATE: 2.22 G/SPEC (ref 0.98–2.2)
CREAT CL 24H UR+SERPL-VRATE: 200 ML/MIN (ref 66–143)
CREAT CL/1.73 SQ M 24H UR+SERPL-ARVRAT: 148 ML/MIN/1.7M2 (ref 110–180)
CREAT SERPL-MCNC: 0.77 MG/DL (ref 0.67–1.17)
CREAT UR-MCNC: 79.9 MG/DL
SPECIMEN VOL UR: 2778 ML

## 2025-09-08 ENCOUNTER — PRE VISIT (OUTPATIENT)
Dept: ONCOLOGY | Facility: CLINIC | Age: 50
End: 2025-09-08
Payer: COMMERCIAL

## (undated) DEVICE — Device

## (undated) DEVICE — ESU ELEC LOOP 24FR 20750G

## (undated) DEVICE — CABLE HIGH FREQEUCY STERILE 8MM PLUG 300CM 277KB-D/10

## (undated) DEVICE — SUCTION MANIFOLD NEPTUNE 2 SYS 4 PORT 0702-020-000

## (undated) DEVICE — GOWN XLG DISP 9545

## (undated) DEVICE — GLOVE BIOGEL PI ULTRATOUCH G SZ 7.0 42170

## (undated) DEVICE — ADAPTER LUER LOCK EQUASHIELD LL-1

## (undated) DEVICE — PREP POVIDONE IODINE SCRUB 7.5% 120ML

## (undated) RX ORDER — SODIUM CHLORIDE 9 MG/ML
INJECTION, SOLUTION INTRAVENOUS
Status: DISPENSED
Start: 2025-07-14

## (undated) RX ORDER — HYDROMORPHONE HYDROCHLORIDE 1 MG/ML
INJECTION, SOLUTION INTRAMUSCULAR; INTRAVENOUS; SUBCUTANEOUS
Status: DISPENSED
Start: 2025-07-14

## (undated) RX ORDER — KETOROLAC TROMETHAMINE 30 MG/ML
INJECTION, SOLUTION INTRAMUSCULAR; INTRAVENOUS
Status: DISPENSED
Start: 2025-07-14

## (undated) RX ORDER — ONDANSETRON 2 MG/ML
INJECTION INTRAMUSCULAR; INTRAVENOUS
Status: DISPENSED
Start: 2025-07-14

## (undated) RX ORDER — DEXAMETHASONE SODIUM PHOSPHATE 10 MG/ML
INJECTION, SOLUTION INTRAMUSCULAR; INTRAVENOUS
Status: DISPENSED
Start: 2025-07-14

## (undated) RX ORDER — FENTANYL CITRATE 50 UG/ML
INJECTION, SOLUTION INTRAMUSCULAR; INTRAVENOUS
Status: DISPENSED
Start: 2025-07-14

## (undated) RX ORDER — CEFAZOLIN SODIUM/WATER 2 G/20 ML
SYRINGE (ML) INTRAVENOUS
Status: DISPENSED
Start: 2025-07-14

## (undated) RX ORDER — DEXMEDETOMIDINE HYDROCHLORIDE 100 UG/ML
INJECTION, SOLUTION INTRAVENOUS
Status: DISPENSED
Start: 2025-07-14